# Patient Record
Sex: MALE | Race: WHITE | HISPANIC OR LATINO | URBAN - METROPOLITAN AREA
[De-identification: names, ages, dates, MRNs, and addresses within clinical notes are randomized per-mention and may not be internally consistent; named-entity substitution may affect disease eponyms.]

---
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
## 2021-09-30 NOTE — PATIENT PROFILE PEDIATRIC. - CONTRAINDICATIONS (SELECT ALL THAT APPLY)
[FreeTextEntry1] : I, Ninoska Morales wrote this note acting as a scribe for Dr. Guero Jeong on Sep 30, 2021.  none...

## 2022-06-05 ENCOUNTER — NON-APPOINTMENT (OUTPATIENT)
Age: 22
End: 2022-06-05

## 2023-10-10 NOTE — PATIENT PROFILE PEDIATRIC. - PRO ANTICIPATED DISCH DISP
Arrives with c/o abscess to left axilla and left groin. Denies drainage. Denies fevers/chills.   
home

## 2023-12-04 ENCOUNTER — EMERGENCY (EMERGENCY)
Facility: HOSPITAL | Age: 23
LOS: 0 days | Discharge: HOME | End: 2023-12-04
Attending: EMERGENCY MEDICINE
Payer: MEDICAID

## 2023-12-04 VITALS
WEIGHT: 248.9 LBS | TEMPERATURE: 99 F | DIASTOLIC BLOOD PRESSURE: 62 MMHG | SYSTOLIC BLOOD PRESSURE: 128 MMHG | HEART RATE: 115 BPM | OXYGEN SATURATION: 99 % | RESPIRATION RATE: 17 BRPM

## 2023-12-04 DIAGNOSIS — R04.2 HEMOPTYSIS: ICD-10-CM

## 2023-12-04 DIAGNOSIS — J02.9 ACUTE PHARYNGITIS, UNSPECIFIED: ICD-10-CM

## 2023-12-04 DIAGNOSIS — R06.83 SNORING: ICD-10-CM

## 2023-12-04 PROCEDURE — 99283 EMERGENCY DEPT VISIT LOW MDM: CPT

## 2023-12-04 PROCEDURE — 99282 EMERGENCY DEPT VISIT SF MDM: CPT

## 2023-12-04 NOTE — ED PROVIDER NOTE - PATIENT PORTAL LINK FT
You can access the FollowMyHealth Patient Portal offered by [facility] by registering at the following website: [email or web address]. By joining [email or web address]’s FollowMyHealth portal, you will also be able to view your health information using other applications (apps) compatible with our system. You can access the FollowMyHealth Patient Portal offered by [facility] by registering at the following website: [email or web address]. By joining [employer]’s FollowMyHealth portal, you will also be able to view your health information using other applications (apps) compatible with our system.

## 2023-12-04 NOTE — ED ADULT TRIAGE NOTE - GLASGOW COMA SCALE: BEST MOTOR RESPONSE, MLM
[address] [facility]        Pt Name: Samm Jones  MRN: [id number]  [place] [address] [place] [address]       [place], was in York Hospital visiting someone at Critical access M6) obeys commands

## 2023-12-04 NOTE — ED PROVIDER NOTE - ATTENDING CONTRIBUTION TO CARE
23yM p/w intermittent sore throat and says he coughs up sputum every morning that is now bloody.  Also c/o nighttime snoring and knows he needs a sleep study.

## 2023-12-04 NOTE — ED PROVIDER NOTE - OBJECTIVE STATEMENT
23 yoM without significant PMHx who presents for persistent sore throat and hemoptysis in the AM for the past couple months. Reportedly has been snoring significantly and intermittently waking up at night needing to catch a breath. Has a scheduled sleep study. Is also concern regarding tonsils and if inflamed. Denies any fever, chills, N/V, chest pain, abd pain, dyspnea. No symptoms at this time. Hemoptysis only in the morning. No hormone supplements, tobacco use, or recent surgeries.

## 2023-12-04 NOTE — ED PROVIDER NOTE - PHYSICAL EXAMINATION
Initial vital signs reviewed.  General: NAD, nontoxic appearing.  HENT: AT/NC. Oropharynx clear. Tonsils without edema or erythema. No postnasal bleeding noted.  Eyes: non-injected conjunctivae b/l.  Neck: supple.  CV: RRR, no murmurs. 2+ distal pulses x4.  Pulm: nonlabored work of breathing, CTAB.  Abd: soft, nondistended, nontender.  MSK: no joint deformity.  Skin: warm, dry, well-perfused.  Neuro: A&Ox4.  Psych: appropriate mood and affect.

## 2023-12-04 NOTE — ED ADULT TRIAGE NOTE - CHIEF COMPLAINT QUOTE
Pt complaining of spitting blood. denies cough. also complaining of pain while swallowing. speaking in full sentences in triage.

## 2023-12-04 NOTE — ED PROVIDER NOTE - CLINICAL SUMMARY MEDICAL DECISION MAKING FREE TEXT BOX
23yM p/w intermittent sore throat (none at present) and daily morning cough, now w/ minimal hemoptysis.  Pt not on a/c, is well appearing, hemodynamically stable w/o airway compromise or resp distress.  D/w pt need for close o/p f/u (ENT for post nasal drip, pulm for sleep apnea testing and hemoptysis workup), supportive care and return precautions.

## 2023-12-18 ENCOUNTER — APPOINTMENT (OUTPATIENT)
Dept: OTOLARYNGOLOGY | Facility: CLINIC | Age: 23
End: 2023-12-18

## 2023-12-25 ENCOUNTER — EMERGENCY (EMERGENCY)
Facility: HOSPITAL | Age: 23
LOS: 1 days | Discharge: HOME | End: 2023-12-25
Attending: EMERGENCY MEDICINE
Payer: MEDICAID

## 2023-12-25 VITALS
OXYGEN SATURATION: 98 % | HEART RATE: 126 BPM | WEIGHT: 315 LBS | TEMPERATURE: 101 F | RESPIRATION RATE: 20 BRPM | SYSTOLIC BLOOD PRESSURE: 126 MMHG | HEIGHT: 69 IN | DIASTOLIC BLOOD PRESSURE: 91 MMHG

## 2023-12-25 VITALS
TEMPERATURE: 100 F | DIASTOLIC BLOOD PRESSURE: 77 MMHG | HEART RATE: 105 BPM | SYSTOLIC BLOOD PRESSURE: 128 MMHG | RESPIRATION RATE: 20 BRPM | OXYGEN SATURATION: 96 %

## 2023-12-25 LAB
ALBUMIN SERPL ELPH-MCNC: 3.4 G/DL — LOW (ref 3.5–5)
ALBUMIN SERPL ELPH-MCNC: 3.4 G/DL — LOW (ref 3.5–5)
ALP SERPL-CCNC: 64 U/L — SIGNIFICANT CHANGE UP (ref 40–120)
ALP SERPL-CCNC: 64 U/L — SIGNIFICANT CHANGE UP (ref 40–120)
ALT FLD-CCNC: 28 U/L DA — SIGNIFICANT CHANGE UP (ref 10–60)
ALT FLD-CCNC: 28 U/L DA — SIGNIFICANT CHANGE UP (ref 10–60)
ANION GAP SERPL CALC-SCNC: 6 MMOL/L — SIGNIFICANT CHANGE UP (ref 5–17)
ANION GAP SERPL CALC-SCNC: 6 MMOL/L — SIGNIFICANT CHANGE UP (ref 5–17)
AST SERPL-CCNC: 20 U/L — SIGNIFICANT CHANGE UP (ref 10–40)
AST SERPL-CCNC: 20 U/L — SIGNIFICANT CHANGE UP (ref 10–40)
BASOPHILS # BLD AUTO: 0.04 K/UL — SIGNIFICANT CHANGE UP (ref 0–0.2)
BASOPHILS # BLD AUTO: 0.04 K/UL — SIGNIFICANT CHANGE UP (ref 0–0.2)
BASOPHILS NFR BLD AUTO: 0.5 % — SIGNIFICANT CHANGE UP (ref 0–2)
BASOPHILS NFR BLD AUTO: 0.5 % — SIGNIFICANT CHANGE UP (ref 0–2)
BILIRUB SERPL-MCNC: 0.4 MG/DL — SIGNIFICANT CHANGE UP (ref 0.2–1.2)
BILIRUB SERPL-MCNC: 0.4 MG/DL — SIGNIFICANT CHANGE UP (ref 0.2–1.2)
BUN SERPL-MCNC: 10 MG/DL — SIGNIFICANT CHANGE UP (ref 7–18)
BUN SERPL-MCNC: 10 MG/DL — SIGNIFICANT CHANGE UP (ref 7–18)
CALCIUM SERPL-MCNC: 8.9 MG/DL — SIGNIFICANT CHANGE UP (ref 8.4–10.5)
CALCIUM SERPL-MCNC: 8.9 MG/DL — SIGNIFICANT CHANGE UP (ref 8.4–10.5)
CHLORIDE SERPL-SCNC: 103 MMOL/L — SIGNIFICANT CHANGE UP (ref 96–108)
CHLORIDE SERPL-SCNC: 103 MMOL/L — SIGNIFICANT CHANGE UP (ref 96–108)
CO2 SERPL-SCNC: 28 MMOL/L — SIGNIFICANT CHANGE UP (ref 22–31)
CO2 SERPL-SCNC: 28 MMOL/L — SIGNIFICANT CHANGE UP (ref 22–31)
CREAT SERPL-MCNC: 1.32 MG/DL — HIGH (ref 0.5–1.3)
CREAT SERPL-MCNC: 1.32 MG/DL — HIGH (ref 0.5–1.3)
EGFR: 78 ML/MIN/1.73M2 — SIGNIFICANT CHANGE UP
EGFR: 78 ML/MIN/1.73M2 — SIGNIFICANT CHANGE UP
EOSINOPHIL # BLD AUTO: 0.07 K/UL — SIGNIFICANT CHANGE UP (ref 0–0.5)
EOSINOPHIL # BLD AUTO: 0.07 K/UL — SIGNIFICANT CHANGE UP (ref 0–0.5)
EOSINOPHIL NFR BLD AUTO: 0.8 % — SIGNIFICANT CHANGE UP (ref 0–6)
EOSINOPHIL NFR BLD AUTO: 0.8 % — SIGNIFICANT CHANGE UP (ref 0–6)
FLUAV AG NPH QL: SIGNIFICANT CHANGE UP
FLUAV AG NPH QL: SIGNIFICANT CHANGE UP
FLUBV AG NPH QL: SIGNIFICANT CHANGE UP
FLUBV AG NPH QL: SIGNIFICANT CHANGE UP
GLUCOSE SERPL-MCNC: 100 MG/DL — HIGH (ref 70–99)
GLUCOSE SERPL-MCNC: 100 MG/DL — HIGH (ref 70–99)
HCT VFR BLD CALC: 34.4 % — LOW (ref 39–50)
HCT VFR BLD CALC: 34.4 % — LOW (ref 39–50)
HGB BLD-MCNC: 11.1 G/DL — LOW (ref 13–17)
HGB BLD-MCNC: 11.1 G/DL — LOW (ref 13–17)
HIV 1 & 2 AB SERPL IA.RAPID: SIGNIFICANT CHANGE UP
HIV 1 & 2 AB SERPL IA.RAPID: SIGNIFICANT CHANGE UP
IMM GRANULOCYTES NFR BLD AUTO: 0.5 % — SIGNIFICANT CHANGE UP (ref 0–0.9)
IMM GRANULOCYTES NFR BLD AUTO: 0.5 % — SIGNIFICANT CHANGE UP (ref 0–0.9)
LACTATE SERPL-SCNC: 1 MMOL/L — SIGNIFICANT CHANGE UP (ref 0.7–2)
LACTATE SERPL-SCNC: 1 MMOL/L — SIGNIFICANT CHANGE UP (ref 0.7–2)
LYMPHOCYTES # BLD AUTO: 1.46 K/UL — SIGNIFICANT CHANGE UP (ref 1–3.3)
LYMPHOCYTES # BLD AUTO: 1.46 K/UL — SIGNIFICANT CHANGE UP (ref 1–3.3)
LYMPHOCYTES # BLD AUTO: 17.2 % — SIGNIFICANT CHANGE UP (ref 13–44)
LYMPHOCYTES # BLD AUTO: 17.2 % — SIGNIFICANT CHANGE UP (ref 13–44)
MAGNESIUM SERPL-MCNC: 2 MG/DL — SIGNIFICANT CHANGE UP (ref 1.6–2.6)
MAGNESIUM SERPL-MCNC: 2 MG/DL — SIGNIFICANT CHANGE UP (ref 1.6–2.6)
MCHC RBC-ENTMCNC: 24.5 PG — LOW (ref 27–34)
MCHC RBC-ENTMCNC: 24.5 PG — LOW (ref 27–34)
MCHC RBC-ENTMCNC: 32.3 GM/DL — SIGNIFICANT CHANGE UP (ref 32–36)
MCHC RBC-ENTMCNC: 32.3 GM/DL — SIGNIFICANT CHANGE UP (ref 32–36)
MCV RBC AUTO: 75.9 FL — LOW (ref 80–100)
MCV RBC AUTO: 75.9 FL — LOW (ref 80–100)
MONOCYTES # BLD AUTO: 1.07 K/UL — HIGH (ref 0–0.9)
MONOCYTES # BLD AUTO: 1.07 K/UL — HIGH (ref 0–0.9)
MONOCYTES NFR BLD AUTO: 12.6 % — SIGNIFICANT CHANGE UP (ref 2–14)
MONOCYTES NFR BLD AUTO: 12.6 % — SIGNIFICANT CHANGE UP (ref 2–14)
NEUTROPHILS # BLD AUTO: 5.81 K/UL — SIGNIFICANT CHANGE UP (ref 1.8–7.4)
NEUTROPHILS # BLD AUTO: 5.81 K/UL — SIGNIFICANT CHANGE UP (ref 1.8–7.4)
NEUTROPHILS NFR BLD AUTO: 68.4 % — SIGNIFICANT CHANGE UP (ref 43–77)
NEUTROPHILS NFR BLD AUTO: 68.4 % — SIGNIFICANT CHANGE UP (ref 43–77)
NRBC # BLD: 0 /100 WBCS — SIGNIFICANT CHANGE UP (ref 0–0)
NRBC # BLD: 0 /100 WBCS — SIGNIFICANT CHANGE UP (ref 0–0)
PHOSPHATE SERPL-MCNC: 3.6 MG/DL — SIGNIFICANT CHANGE UP (ref 2.5–4.5)
PHOSPHATE SERPL-MCNC: 3.6 MG/DL — SIGNIFICANT CHANGE UP (ref 2.5–4.5)
PLATELET # BLD AUTO: 317 K/UL — SIGNIFICANT CHANGE UP (ref 150–400)
PLATELET # BLD AUTO: 317 K/UL — SIGNIFICANT CHANGE UP (ref 150–400)
POTASSIUM SERPL-MCNC: 4.1 MMOL/L — SIGNIFICANT CHANGE UP (ref 3.5–5.3)
POTASSIUM SERPL-MCNC: 4.1 MMOL/L — SIGNIFICANT CHANGE UP (ref 3.5–5.3)
POTASSIUM SERPL-SCNC: 4.1 MMOL/L — SIGNIFICANT CHANGE UP (ref 3.5–5.3)
POTASSIUM SERPL-SCNC: 4.1 MMOL/L — SIGNIFICANT CHANGE UP (ref 3.5–5.3)
PROT SERPL-MCNC: 7.1 G/DL — SIGNIFICANT CHANGE UP (ref 6–8.3)
PROT SERPL-MCNC: 7.1 G/DL — SIGNIFICANT CHANGE UP (ref 6–8.3)
RBC # BLD: 4.53 M/UL — SIGNIFICANT CHANGE UP (ref 4.2–5.8)
RBC # BLD: 4.53 M/UL — SIGNIFICANT CHANGE UP (ref 4.2–5.8)
RBC # FLD: 14.6 % — HIGH (ref 10.3–14.5)
RBC # FLD: 14.6 % — HIGH (ref 10.3–14.5)
SARS-COV-2 RNA SPEC QL NAA+PROBE: SIGNIFICANT CHANGE UP
SARS-COV-2 RNA SPEC QL NAA+PROBE: SIGNIFICANT CHANGE UP
SODIUM SERPL-SCNC: 137 MMOL/L — SIGNIFICANT CHANGE UP (ref 135–145)
SODIUM SERPL-SCNC: 137 MMOL/L — SIGNIFICANT CHANGE UP (ref 135–145)
WBC # BLD: 8.49 K/UL — SIGNIFICANT CHANGE UP (ref 3.8–10.5)
WBC # BLD: 8.49 K/UL — SIGNIFICANT CHANGE UP (ref 3.8–10.5)
WBC # FLD AUTO: 8.49 K/UL — SIGNIFICANT CHANGE UP (ref 3.8–10.5)
WBC # FLD AUTO: 8.49 K/UL — SIGNIFICANT CHANGE UP (ref 3.8–10.5)

## 2023-12-25 PROCEDURE — 82962 GLUCOSE BLOOD TEST: CPT

## 2023-12-25 PROCEDURE — 94640 AIRWAY INHALATION TREATMENT: CPT

## 2023-12-25 PROCEDURE — 85025 COMPLETE CBC W/AUTO DIFF WBC: CPT

## 2023-12-25 PROCEDURE — 71046 X-RAY EXAM CHEST 2 VIEWS: CPT | Mod: 26

## 2023-12-25 PROCEDURE — 87637 SARSCOV2&INF A&B&RSV AMP PRB: CPT

## 2023-12-25 PROCEDURE — 83605 ASSAY OF LACTIC ACID: CPT

## 2023-12-25 PROCEDURE — 99284 EMERGENCY DEPT VISIT MOD MDM: CPT

## 2023-12-25 PROCEDURE — 96374 THER/PROPH/DIAG INJ IV PUSH: CPT

## 2023-12-25 PROCEDURE — 99285 EMERGENCY DEPT VISIT HI MDM: CPT | Mod: 25

## 2023-12-25 PROCEDURE — 86703 HIV-1/HIV-2 1 RESULT ANTBDY: CPT

## 2023-12-25 PROCEDURE — 36415 COLL VENOUS BLD VENIPUNCTURE: CPT

## 2023-12-25 PROCEDURE — 93005 ELECTROCARDIOGRAM TRACING: CPT

## 2023-12-25 PROCEDURE — 96361 HYDRATE IV INFUSION ADD-ON: CPT

## 2023-12-25 PROCEDURE — 83735 ASSAY OF MAGNESIUM: CPT

## 2023-12-25 PROCEDURE — 80053 COMPREHEN METABOLIC PANEL: CPT

## 2023-12-25 PROCEDURE — 84100 ASSAY OF PHOSPHORUS: CPT

## 2023-12-25 PROCEDURE — 71046 X-RAY EXAM CHEST 2 VIEWS: CPT

## 2023-12-25 RX ORDER — SODIUM CHLORIDE 9 MG/ML
1000 INJECTION INTRAMUSCULAR; INTRAVENOUS; SUBCUTANEOUS ONCE
Refills: 0 | Status: COMPLETED | OUTPATIENT
Start: 2023-12-25 | End: 2023-12-25

## 2023-12-25 RX ORDER — KETOROLAC TROMETHAMINE 30 MG/ML
15 SYRINGE (ML) INJECTION ONCE
Refills: 0 | Status: DISCONTINUED | OUTPATIENT
Start: 2023-12-25 | End: 2023-12-25

## 2023-12-25 RX ORDER — ALBUTEROL 90 UG/1
2.5 AEROSOL, METERED ORAL
Refills: 0 | Status: COMPLETED | OUTPATIENT
Start: 2023-12-25 | End: 2023-12-25

## 2023-12-25 RX ORDER — ACETAMINOPHEN 500 MG
650 TABLET ORAL ONCE
Refills: 0 | Status: COMPLETED | OUTPATIENT
Start: 2023-12-25 | End: 2023-12-25

## 2023-12-25 RX ORDER — ALBUTEROL 90 UG/1
2 AEROSOL, METERED ORAL ONCE
Refills: 0 | Status: COMPLETED | OUTPATIENT
Start: 2023-12-25 | End: 2023-12-25

## 2023-12-25 RX ADMIN — ALBUTEROL 2.5 MILLIGRAM(S): 90 AEROSOL, METERED ORAL at 17:26

## 2023-12-25 RX ADMIN — ALBUTEROL 2 PUFF(S): 90 AEROSOL, METERED ORAL at 19:42

## 2023-12-25 RX ADMIN — Medication 40 MILLIGRAM(S): at 16:56

## 2023-12-25 RX ADMIN — SODIUM CHLORIDE 1000 MILLILITER(S): 9 INJECTION INTRAMUSCULAR; INTRAVENOUS; SUBCUTANEOUS at 16:58

## 2023-12-25 RX ADMIN — ALBUTEROL 2.5 MILLIGRAM(S): 90 AEROSOL, METERED ORAL at 17:10

## 2023-12-25 RX ADMIN — SODIUM CHLORIDE 1000 MILLILITER(S): 9 INJECTION INTRAMUSCULAR; INTRAVENOUS; SUBCUTANEOUS at 19:31

## 2023-12-25 RX ADMIN — ALBUTEROL 2.5 MILLIGRAM(S): 90 AEROSOL, METERED ORAL at 16:56

## 2023-12-25 RX ADMIN — Medication 15 MILLIGRAM(S): at 16:56

## 2023-12-25 RX ADMIN — Medication 15 MILLIGRAM(S): at 17:26

## 2023-12-25 NOTE — ED PROVIDER NOTE - CLINICAL SUMMARY MEDICAL DECISION MAKING FREE TEXT BOX
23 male with hx of no known medical problems.   Pt presenting to the ED reporting 1-2 days of fever, cough w blood tinged sputum, dyspnea, & vomiting/diarrhea.   Triage reporting vomiting blood but Pt (& aunt by phone) confirm it is cough w blood tinged sputum.  Vitals w fever & tachycardia  Nontoxic appearing, n/v intact. Airway intact, no respiratory distress, no hypoxia. Lungs wheezing b/l. No abdominal or CVA tenderness. No recent black/bloody stools.  Symptoms concerning for viral illness w bronchitis    Plan to obtain:    -Labs, EKG, CXR r/o PNA, analgesia, antiemetics, antipyretics, & bronchodilators as needed, IV fluids, steroids, observe/re-assess.    Lab values demonstrate no acute/emergent pathology.    Independent interpretation of the EKG: Sinus @ 119, normal axis, normal intervals, normal ST/T  Independent interpretation of XR: ***    ***    Pt advised regarding need for close outpatient follow up.  Patient stable for further care in outpatient setting. No indication for inpatient admission at this time. Patient advised regarding symptomatic & supportive care and symptoms to prompt ED return. Strict return precautions provided.  ***  Patient requires inpatient admission for further care & stabilization. Care signed out to inpatient team. 23 male with hx of no known medical problems.   Pt presenting to the ED reporting 1-2 days of fever, cough w blood tinged sputum, dyspnea, & vomiting/diarrhea.   Triage reporting vomiting blood but Pt (& aunt by phone) confirm it is cough w blood tinged sputum.  Vitals w fever & tachycardia  Nontoxic appearing, n/v intact. Airway intact, no respiratory distress, no hypoxia. Lungs wheezing b/l. No abdominal or CVA tenderness. No recent black/bloody stools.  Symptoms concerning for viral illness w bronchitis    Plan to obtain:    -Labs, EKG, CXR r/o PNA, analgesia, antiemetics, antipyretics, & bronchodilators as needed, IV fluids, steroids, observe/re-assess.    Lab values demonstrate no acute/emergent pathology.  Independent interpretation of the EKG: Sinus @ 119, normal axis, normal intervals, normal ST/T  Independent interpretation of XR: No consolidation/effusion/pntx   Steroids, bronchodilators, & antipyretics given w improved symptoms.  Pt advised regarding need for close outpatient follow up.  Patient stable for further care in outpatient setting. No indication for inpatient admission at this time. Patient advised regarding symptomatic & supportive care and symptoms to prompt ED return. Strict return precautions provided.

## 2023-12-25 NOTE — ED PROVIDER NOTE - NSFOLLOWUPCLINICS_GEN_ALL_ED_FT
[facility]  Internal Medicine  [address] [place] [address]  Phone: [phone]  Fax: [phone]  Follow Up Time: 1-3 Days     [facility]  Internal Medicine  [address] [place] [address]  Phone: [phone]  Fax: [phone]  Follow Up Time: 1-3 Days

## 2023-12-25 NOTE — ED PROVIDER NOTE - NS ED ROS FT
Constitutional: (+) fever Tmax 101.9 (-) chills  HENT: (-) congestion (-) rhinorrhea (+) sore throat  Eyes: (-) pain (-) redness  Respiratory: (+) cough w sputum and blood tinged (+) shortness of breath (-) wheezing (-) stridor    Cardiovascular: (-) chest pain (-) palpitations (-) leg swelling  Gastrointestinal: (-) abdominal pain (-) blood in stool (no melena/hematochezia) (+) diarrhea (+) vomiting x4 (no blood/bile)  Genitourinary: (-) dysuria (-) hematuria  Musculoskeletal: (-) gait problem (-) joint swelling (-) myalgias  Skin: (-) color change (-) rash  Neurological: (-) weakness (-) numbness (-) headaches  Psychiatric/Behavioral: (-) confusion

## 2023-12-25 NOTE — ED PROVIDER NOTE - PHYSICAL EXAMINATION
Gen:  Awake, alert, NAD, WDWN, NCAT, non-toxic appearing.   Eyes:  PERRL, EOMI, no icterus, normal lids/lashes, normal conjunctivae.  ENT:  External inspection normal, pink/moist membranes. Pharynx normal, no pharyngeal erythema/exudate, no drooling, no lip/tongue/palate/posterior pharynx edema, midline uvula, no oropharyngeal ulcerations/lesions.  CV:  S1S2, mild tachycardia, regular rhythm, no murmur/gallops/rubs, no JVD, 2+ pulses b/l, no edema/cords/homans, warm/well-perfused.  Resp:  Normal respiratory rate/effort, no respiratory distress, normal voice, speaking full sentences, lungs wheezing to auscultation b/l, no retractions, no stridor.  Abd:  Soft abdomen, no tender/distended/guarding/rebound, no pulsatile mass, no CVA tender.   Musculoskeletal:  N/V intact, FROM all 4 extremities, normal motor tone, stable gait.   Neck:  FROM neck, supple, trachea midline, no meningismus.   Skin:  Color normal for race, warm and dry, no rash.  Neuro:  Oriented x3, CN 2-12 intact (grossly), normal motor (grossly), normal sensory (grossly), normal gait. GCS 15  Psych:  Attention normal. Affect normal. Behavior normal. Judgment normal.

## 2023-12-25 NOTE — ED PROVIDER NOTE - OBJECTIVE STATEMENT
23 male with hx of no known medical problems.   Pt presenting to the ED reporting []    No trauma. No blood thinner use. No new exercise/strain.    No recent travel, hospitalization, or immobilization. 23 male with hx of HLD.   Pt presenting to the ED reporting 1-2 days of fever, cough w blood tinged sputum, dyspnea, & vomiting/diarrhea.   No recent travel, hospitalization, or immobilization.

## 2023-12-25 NOTE — ED PROVIDER NOTE - PATIENT PORTAL LINK FT
You can access the FollowMyHealth Patient Portal offered by [facility] by registering at the following website: [email or web address]. By joining [employer]’s FollowMyHealth portal, you will also be able to view your health information using other applications (apps) compatible with our system. You can access the FollowMyHealth Patient Portal offered by [facility] by registering at the following website: [email or web address]. By joining [employer]’s FollowMyHealth portal, you will also be able to view your health information using other applications (apps) compatible with our system.

## 2023-12-25 NOTE — ED PROVIDER NOTE - PROGRESS NOTE DETAILS
Results reviewed.  Pt reports feeling better.  Tolerating PO intake.  Albuterol MDI given for discharge.  Pt/family advised regarding symptomatic/supportive care, importance of PMD follow up, and symptoms to prompt ED return.

## 2023-12-25 NOTE — ED ADULT NURSE NOTE - NSFALLUNIVINTERV_ED_ALL_ED
Bed/Stretcher in lowest position, wheels locked, appropriate side rails in place/Call bell, personal items and telephone in reach/Instruct patient to call for assistance before getting out of bed/chair/stretcher/Non-slip footwear applied when patient is off stretcher/[place] to call system/Physically safe environment - no spills, clutter or unnecessary equipment/Purposeful proactive rounding/Room/bathroom lighting operational, light cord in reach Bed/Stretcher in lowest position, wheels locked, appropriate side rails in place/Call bell, personal items and telephone in reach/Instruct patient to call for assistance before getting out of bed/chair/stretcher/Non-slip footwear applied when patient is off stretcher/[place] to call system/Physically safe environment - no spills, clutter or unnecessary equipment/Purposeful proactive rounding/Room/bathroom lighting operational, light cord in reach

## 2023-12-25 NOTE — ED PROVIDER NOTE - NSFOLLOWUPINSTRUCTIONS_ED_ALL_ED_FT
Please follow up with your PMD or Medicine Clinic in 2-3 days.  Return to the ER for worsening or concerning symptoms.  Drink plenty of fluids or an oral rehydration solution like Pedialyte, Gatorade, or Powerade.  Keep your diet simple until symptoms improve. Introduce foods as tolerated such as bread, toast, plain rice, boiled potatoes, boiled chicken, bananas, apple sauce, etc. Avoid dairy, spicy, greasy, or fatty foods. Avoid alcohol or tobacco.  Quarantine at home for 5-10 days after the start of symptoms. Isolate from any family members you live with.  Take Albuterol 2 puffs with spacer every 4 hours as needed for wheezing/difficulty breathing.  Take Prednisone 40mg daily for the next 5 days.  Take Ibuprofen (Advil or Motrin) 600mg every 6 hours as needed for pain or fever with food.  Take Acetaminophen (Tylenol) 650mg every 6 hours as needed for pain or fever.    - - - - - - - - - - - - -  Acute Bronchitis, Adult  A comparison between normal and swollen airways, or bronchi, in the lungs.  Acute bronchitis is sudden inflammation of the main airways (bronchi) that come off the windpipe (trachea) in the lungs. The swelling causes the airways to get smaller and make more mucus than normal. This can make it hard to breathe and can cause coughing or noisy breathing (wheezing).    Acute bronchitis may last several weeks. The cough may last longer. Allergies, asthma, and exposure to smoke may make the condition worse.    What are the causes?  This condition can be caused by germs and by substances that irritate the lungs, including:  Cold and flu viruses. The most common cause of this condition is the virus that causes the common cold.  Bacteria. This is less common.  Breathing in substances that irritate the lungs, including:  Smoke from cigarettes and other forms of tobacco.  Dust and pollen.  Fumes from household cleaning products, gases, or burned fuel.  Indoor or outdoor air pollution.  What increases the risk?  The following factors may make you more likely to develop this condition:  A weak body's defense system, also called the immune system.  A condition that affects your lungs and breathing, such as asthma.  What are the signs or symptoms?  Common symptoms of this condition include:  Coughing. This may bring up clear, yellow, or green mucus from your lungs (sputum).  Wheezing.  Runny or stuffy nose.  Having too much mucus in your lungs (chest congestion).  Shortness of breath.  Aches and pains, including sore throat or chest.  How is this diagnosed?  This condition is usually diagnosed based on:  Your symptoms and medical history.  A physical exam.  You may also have other tests, including tests to rule out other conditions, such as pneumonia. These tests include:  A test of lung function.  Test of a mucus sample to look for the presence of bacteria.  Tests to check the oxygen level in your blood.  Blood tests.  Chest X-ray.  How is this treated?  Most cases of acute bronchitis clear up over time without treatment. Your health care provider may recommend:  Drinking more fluids to help thin your mucus so it is easier to cough up.  Taking inhaled medicine (inhaler) to improve air flow in and out of your lungs.  Using a vaporizer or a humidifier. These are machines that add water to the air to help you breathe better.  Taking a medicine that thins mucus and clears congestion (expectorant).  Taking a medicine that prevents or stops coughing (cough suppressant).  It is not common to take an antibiotic medicine for this condition.    Follow these instructions at home:  A do not smoke cigarettes sign.  Take over-the-counter and prescription medicines only as told by your health care provider.  Use an inhaler, vaporizer, or humidifier as told by your health care provider.  Take two teaspoons (10 mL) of honey at bedtime to lessen coughing at night.  Drink enough fluid to keep your urine pale yellow.  Do not use any products that contain nicotine or tobacco. These products include cigarettes, chewing tobacco, and vaping devices, such as e-cigarettes. If you need help quitting, ask your health care provider.  Get plenty of rest.  Return to your normal activities as told by your health care provider. Ask your health care provider what activities are safe for you.  Keep all follow-up visits. This is important.  How is this prevented?  Washing hands with soap and water.  To lower your risk of getting this condition again:  Wash your hands often with soap and water for at least 20 seconds. If soap and water are not available, use hand .  Avoid contact with people who have cold symptoms.  Try not to touch your mouth, nose, or eyes with your hands.  Avoid breathing in smoke or chemical fumes. Breathing smoke or chemical fumes will make your condition worse.  Get the flu shot every year.  Contact a health care provider if:  Your symptoms do not improve after 2 weeks.  You have trouble coughing up the mucus.  Your cough keeps you awake at night.  You have a fever.  Get help right away if you:  Cough up blood.  Feel pain in your chest.  Have severe shortness of breath.  Faint or keep feeling like you are going to faint.  Have a severe headache.  Have a fever or chills that get worse.  These symptoms may represent a serious problem that is an emergency. Do not wait to see if the symptoms will go away. Get medical help right away. Call your local emergency services (911 in the [place] Do not drive yourself to the hospital.    Summary  Acute bronchitis is inflammation of the main airways (bronchi) that come off the windpipe (trachea) in the lungs. The swelling causes the airways to get smaller and make more mucus than normal.  Drinking more fluids can help thin your mucus so it is easier to cough up.  Take over-the-counter and prescription medicines only as told by your health care provider.  Do not use any products that contain nicotine or tobacco. These products include cigarettes, chewing tobacco, and vaping devices, such as e-cigarettes. If you need help quitting, ask your health care provider.  Contact a health care provider if your symptoms do not improve after 2 weeks.  This information is not intended to replace advice given to you by your health care provider. Make sure you discuss any questions you have with your health care provider.

## 2023-12-25 NOTE — ED ADULT NURSE NOTE - OBJECTIVE STATEMENT
Pt arrived to ED c/o flu like symptoms and vomiting blood   c/o shortness of breath, congestion, wheezing

## 2024-01-05 ENCOUNTER — OUTPATIENT (OUTPATIENT)
Dept: OUTPATIENT SERVICES | Facility: HOSPITAL | Age: 24
LOS: 1 days | End: 2024-01-05
Payer: MEDICAID

## 2024-01-05 ENCOUNTER — APPOINTMENT (OUTPATIENT)
Dept: PULMONOLOGY | Facility: CLINIC | Age: 24
End: 2024-01-05
Payer: MEDICAID

## 2024-01-05 VITALS
OXYGEN SATURATION: 99 % | HEART RATE: 125 BPM | WEIGHT: 251 LBS | DIASTOLIC BLOOD PRESSURE: 79 MMHG | SYSTOLIC BLOOD PRESSURE: 135 MMHG | TEMPERATURE: 97.3 F

## 2024-01-05 DIAGNOSIS — J20.9 ACUTE BRONCHITIS, UNSPECIFIED: ICD-10-CM

## 2024-01-05 DIAGNOSIS — J35.1 HYPERTROPHY OF TONSILS: ICD-10-CM

## 2024-01-05 DIAGNOSIS — R49.0 DYSPHONIA: ICD-10-CM

## 2024-01-05 DIAGNOSIS — Z00.00 ENCOUNTER FOR GENERAL ADULT MEDICAL EXAMINATION WITHOUT ABNORMAL FINDINGS: ICD-10-CM

## 2024-01-05 DIAGNOSIS — J40 BRONCHITIS, NOT SPECIFIED AS ACUTE OR CHRONIC: ICD-10-CM

## 2024-01-05 PROCEDURE — 99204 OFFICE O/P NEW MOD 45 MIN: CPT

## 2024-01-05 NOTE — PHYSICAL EXAM
[No Acute Distress] : no acute distress [Elongated Uvula] : elongated uvula [III] : Mallampati Class: III [2+] : Right Tonsil: 2+ [Normal Appearance] : normal appearance [Normal Rate/Rhythm] : normal rate/rhythm [Normal S1, S2] : normal s1, s2 [No Resp Distress] : no resp distress [Clear to Auscultation Bilaterally] : clear to auscultation bilaterally [No Abnormalities] : no abnormalities [Benign] : benign [Normal Gait] : normal gait [No Clubbing] : no clubbing [No Edema] : no edema [Normal Color/ Pigmentation] : normal color/ pigmentation [Oriented x3] : oriented x3

## 2024-01-05 NOTE — END OF VISIT
[] : Resident [FreeTextEntry3] : Likely viral bronchitis; laryngitis  Improving  Had CXR that was clear On Abx, albuterol and prednisone Also has KIMMIE; following with PCP; recommended CPAP Mother inquiring about adenotonsileectomy as well - ENT referral placed  6 months follow up

## 2024-01-05 NOTE — HISTORY OF PRESENT ILLNESS
[TextBox_4] : 23-year-old man, non, smoker, presenting after referral from urgent care for dry cough and episode of hemoptysis. Patient states that he was tested negative for COVID, Flu and RSV and was prescribed antibiotics. Currently feeling better, only has voice hoarseness persisting. Patient also snores a lot as per the mother and underwent sleep study which showed KIMMIE. Report wasn't available

## 2024-01-05 NOTE — REVIEW OF SYSTEMS
[Cough] : cough [Chest Tightness] : chest tightness [A.M. Dry Mouth] : a.m. dry mouth [Chest Discomfort] : chest discomfort [Negative] : Dermatologic

## 2024-01-05 NOTE — ASSESSMENT
[FreeTextEntry1] : #Acute Bronchitis - RVP normal and CXR normal at urgent care - Continue course of AMoxicillin and Prednisone - Benzonatate and Albuterol as needed   #KIMMIE #Elongated Uvula - Recommended CPAP treatment, will convey with PCP and get back with an answer - Referral to ENT for possible surgical intervention - Educated patient that surgery may or may not be curative - RTC in 6 months or PRN

## 2024-01-09 DIAGNOSIS — G47.33 OBSTRUCTIVE SLEEP APNEA (ADULT) (PEDIATRIC): ICD-10-CM

## 2024-01-09 DIAGNOSIS — J20.9 ACUTE BRONCHITIS, UNSPECIFIED: ICD-10-CM

## 2024-01-09 DIAGNOSIS — J35.1 HYPERTROPHY OF TONSILS: ICD-10-CM

## 2024-01-09 DIAGNOSIS — J40 BRONCHITIS, NOT SPECIFIED AS ACUTE OR CHRONIC: ICD-10-CM

## 2024-01-09 DIAGNOSIS — R49.0 DYSPHONIA: ICD-10-CM

## 2024-01-10 ENCOUNTER — NON-APPOINTMENT (OUTPATIENT)
Age: 24
End: 2024-01-10

## 2024-01-11 ENCOUNTER — EMERGENCY (EMERGENCY)
Facility: HOSPITAL | Age: 24
LOS: 0 days | Discharge: HOME | End: 2024-01-12
Attending: EMERGENCY MEDICINE | Admitting: STUDENT IN AN ORGANIZED HEALTH CARE EDUCATION/TRAINING PROGRAM
Payer: MEDICAID

## 2024-01-11 VITALS
RESPIRATION RATE: 15 BRPM | OXYGEN SATURATION: 97 % | SYSTOLIC BLOOD PRESSURE: 126 MMHG | TEMPERATURE: 98 F | HEIGHT: 69 IN | DIASTOLIC BLOOD PRESSURE: 72 MMHG | WEIGHT: 235.01 LBS | HEART RATE: 117 BPM

## 2024-01-11 DIAGNOSIS — E78.5 HYPERLIPIDEMIA, UNSPECIFIED: ICD-10-CM

## 2024-01-11 DIAGNOSIS — D50.9 IRON DEFICIENCY ANEMIA, UNSPECIFIED: ICD-10-CM

## 2024-01-11 DIAGNOSIS — G47.33 OBSTRUCTIVE SLEEP APNEA (ADULT) (PEDIATRIC): ICD-10-CM

## 2024-01-11 DIAGNOSIS — A15.9 RESPIRATORY TUBERCULOSIS UNSPECIFIED: ICD-10-CM

## 2024-01-11 DIAGNOSIS — J40 BRONCHITIS, NOT SPECIFIED AS ACUTE OR CHRONIC: ICD-10-CM

## 2024-01-11 DIAGNOSIS — R05.9 COUGH, UNSPECIFIED: ICD-10-CM

## 2024-01-11 LAB
ANION GAP SERPL CALC-SCNC: 13 MMOL/L — SIGNIFICANT CHANGE UP (ref 7–14)
ANION GAP SERPL CALC-SCNC: 13 MMOL/L — SIGNIFICANT CHANGE UP (ref 7–14)
BASOPHILS # BLD AUTO: 0.08 K/UL — SIGNIFICANT CHANGE UP (ref 0–0.2)
BASOPHILS # BLD AUTO: 0.08 K/UL — SIGNIFICANT CHANGE UP (ref 0–0.2)
BASOPHILS NFR BLD AUTO: 0.9 % — SIGNIFICANT CHANGE UP (ref 0–1)
BASOPHILS NFR BLD AUTO: 0.9 % — SIGNIFICANT CHANGE UP (ref 0–1)
BUN SERPL-MCNC: 14 MG/DL — SIGNIFICANT CHANGE UP (ref 10–20)
BUN SERPL-MCNC: 14 MG/DL — SIGNIFICANT CHANGE UP (ref 10–20)
CALCIUM SERPL-MCNC: 9.1 MG/DL — SIGNIFICANT CHANGE UP (ref 8.4–10.5)
CALCIUM SERPL-MCNC: 9.1 MG/DL — SIGNIFICANT CHANGE UP (ref 8.4–10.5)
CHLORIDE SERPL-SCNC: 103 MMOL/L — SIGNIFICANT CHANGE UP (ref 98–110)
CHLORIDE SERPL-SCNC: 103 MMOL/L — SIGNIFICANT CHANGE UP (ref 98–110)
CO2 SERPL-SCNC: 23 MMOL/L — SIGNIFICANT CHANGE UP (ref 17–32)
CO2 SERPL-SCNC: 23 MMOL/L — SIGNIFICANT CHANGE UP (ref 17–32)
CREAT SERPL-MCNC: 1 MG/DL — SIGNIFICANT CHANGE UP (ref 0.7–1.5)
CREAT SERPL-MCNC: 1 MG/DL — SIGNIFICANT CHANGE UP (ref 0.7–1.5)
EGFR: 109 ML/MIN/1.73M2 — SIGNIFICANT CHANGE UP
EGFR: 109 ML/MIN/1.73M2 — SIGNIFICANT CHANGE UP
EOSINOPHIL # BLD AUTO: 0.26 K/UL — SIGNIFICANT CHANGE UP (ref 0–0.7)
EOSINOPHIL # BLD AUTO: 0.26 K/UL — SIGNIFICANT CHANGE UP (ref 0–0.7)
EOSINOPHIL NFR BLD AUTO: 2.8 % — SIGNIFICANT CHANGE UP (ref 0–8)
EOSINOPHIL NFR BLD AUTO: 2.8 % — SIGNIFICANT CHANGE UP (ref 0–8)
GLUCOSE SERPL-MCNC: 118 MG/DL — HIGH (ref 70–99)
GLUCOSE SERPL-MCNC: 118 MG/DL — HIGH (ref 70–99)
HCT VFR BLD CALC: 29.5 % — LOW (ref 42–52)
HCT VFR BLD CALC: 29.5 % — LOW (ref 42–52)
HGB BLD-MCNC: 9.2 G/DL — LOW (ref 14–18)
HGB BLD-MCNC: 9.2 G/DL — LOW (ref 14–18)
IMM GRANULOCYTES NFR BLD AUTO: 0.8 % — HIGH (ref 0.1–0.3)
IMM GRANULOCYTES NFR BLD AUTO: 0.8 % — HIGH (ref 0.1–0.3)
LYMPHOCYTES # BLD AUTO: 2.24 K/UL — SIGNIFICANT CHANGE UP (ref 1.2–3.4)
LYMPHOCYTES # BLD AUTO: 2.24 K/UL — SIGNIFICANT CHANGE UP (ref 1.2–3.4)
LYMPHOCYTES # BLD AUTO: 24.4 % — SIGNIFICANT CHANGE UP (ref 20.5–51.1)
LYMPHOCYTES # BLD AUTO: 24.4 % — SIGNIFICANT CHANGE UP (ref 20.5–51.1)
MCHC RBC-ENTMCNC: 23.2 PG — LOW (ref 27–31)
MCHC RBC-ENTMCNC: 23.2 PG — LOW (ref 27–31)
MCHC RBC-ENTMCNC: 31.2 G/DL — LOW (ref 32–37)
MCHC RBC-ENTMCNC: 31.2 G/DL — LOW (ref 32–37)
MCV RBC AUTO: 74.5 FL — LOW (ref 80–94)
MCV RBC AUTO: 74.5 FL — LOW (ref 80–94)
MONOCYTES # BLD AUTO: 0.89 K/UL — HIGH (ref 0.1–0.6)
MONOCYTES # BLD AUTO: 0.89 K/UL — HIGH (ref 0.1–0.6)
MONOCYTES NFR BLD AUTO: 9.7 % — HIGH (ref 1.7–9.3)
MONOCYTES NFR BLD AUTO: 9.7 % — HIGH (ref 1.7–9.3)
NEUTROPHILS # BLD AUTO: 5.63 K/UL — SIGNIFICANT CHANGE UP (ref 1.4–6.5)
NEUTROPHILS # BLD AUTO: 5.63 K/UL — SIGNIFICANT CHANGE UP (ref 1.4–6.5)
NEUTROPHILS NFR BLD AUTO: 61.4 % — SIGNIFICANT CHANGE UP (ref 42.2–75.2)
NEUTROPHILS NFR BLD AUTO: 61.4 % — SIGNIFICANT CHANGE UP (ref 42.2–75.2)
NRBC # BLD: 0 /100 WBCS — SIGNIFICANT CHANGE UP (ref 0–0)
NRBC # BLD: 0 /100 WBCS — SIGNIFICANT CHANGE UP (ref 0–0)
PLATELET # BLD AUTO: 510 K/UL — HIGH (ref 130–400)
PLATELET # BLD AUTO: 510 K/UL — HIGH (ref 130–400)
PMV BLD: 9.3 FL — SIGNIFICANT CHANGE UP (ref 7.4–10.4)
PMV BLD: 9.3 FL — SIGNIFICANT CHANGE UP (ref 7.4–10.4)
POTASSIUM SERPL-MCNC: 4.7 MMOL/L — SIGNIFICANT CHANGE UP (ref 3.5–5)
POTASSIUM SERPL-MCNC: 4.7 MMOL/L — SIGNIFICANT CHANGE UP (ref 3.5–5)
POTASSIUM SERPL-SCNC: 4.7 MMOL/L — SIGNIFICANT CHANGE UP (ref 3.5–5)
POTASSIUM SERPL-SCNC: 4.7 MMOL/L — SIGNIFICANT CHANGE UP (ref 3.5–5)
RBC # BLD: 3.96 M/UL — LOW (ref 4.7–6.1)
RBC # BLD: 3.96 M/UL — LOW (ref 4.7–6.1)
RBC # FLD: 15.5 % — HIGH (ref 11.5–14.5)
RBC # FLD: 15.5 % — HIGH (ref 11.5–14.5)
SODIUM SERPL-SCNC: 139 MMOL/L — SIGNIFICANT CHANGE UP (ref 135–146)
SODIUM SERPL-SCNC: 139 MMOL/L — SIGNIFICANT CHANGE UP (ref 135–146)
WBC # BLD: 9.17 K/UL — SIGNIFICANT CHANGE UP (ref 4.8–10.8)
WBC # BLD: 9.17 K/UL — SIGNIFICANT CHANGE UP (ref 4.8–10.8)
WBC # FLD AUTO: 9.17 K/UL — SIGNIFICANT CHANGE UP (ref 4.8–10.8)
WBC # FLD AUTO: 9.17 K/UL — SIGNIFICANT CHANGE UP (ref 4.8–10.8)

## 2024-01-11 PROCEDURE — 87116 MYCOBACTERIA CULTURE: CPT

## 2024-01-11 PROCEDURE — 80053 COMPREHEN METABOLIC PANEL: CPT

## 2024-01-11 PROCEDURE — 86036 ANCA SCREEN EACH ANTIBODY: CPT

## 2024-01-11 PROCEDURE — 94660 CPAP INITIATION&MGMT: CPT

## 2024-01-11 PROCEDURE — 86403 PARTICLE AGGLUT ANTBDY SCRN: CPT

## 2024-01-11 PROCEDURE — 87206 SMEAR FLUORESCENT/ACID STAI: CPT | Mod: XU

## 2024-01-11 PROCEDURE — 93005 ELECTROCARDIOGRAM TRACING: CPT

## 2024-01-11 PROCEDURE — 86641 CRYPTOCOCCUS ANTIBODY: CPT

## 2024-01-11 PROCEDURE — 87015 SPECIMEN INFECT AGNT CONCNTJ: CPT

## 2024-01-11 PROCEDURE — 96375 TX/PRO/DX INJ NEW DRUG ADDON: CPT | Mod: XU

## 2024-01-11 PROCEDURE — 85025 COMPLETE CBC W/AUTO DIFF WBC: CPT

## 2024-01-11 PROCEDURE — 71046 X-RAY EXAM CHEST 2 VIEWS: CPT

## 2024-01-11 PROCEDURE — 71275 CT ANGIOGRAPHY CHEST: CPT | Mod: TC,MA

## 2024-01-11 PROCEDURE — 86850 RBC ANTIBODY SCREEN: CPT

## 2024-01-11 PROCEDURE — 80048 BASIC METABOLIC PNL TOTAL CA: CPT

## 2024-01-11 PROCEDURE — 83520 IMMUNOASSAY QUANT NOS NONAB: CPT

## 2024-01-11 PROCEDURE — 87385 HISTOPLASMA CAPSUL AG IA: CPT

## 2024-01-11 PROCEDURE — 85610 PROTHROMBIN TIME: CPT

## 2024-01-11 PROCEDURE — 86901 BLOOD TYPING SEROLOGIC RH(D): CPT

## 2024-01-11 PROCEDURE — 36415 COLL VENOUS BLD VENIPUNCTURE: CPT

## 2024-01-11 PROCEDURE — 86900 BLOOD TYPING SEROLOGIC ABO: CPT

## 2024-01-11 PROCEDURE — 96374 THER/PROPH/DIAG INJ IV PUSH: CPT | Mod: XU

## 2024-01-11 PROCEDURE — 99285 EMERGENCY DEPT VISIT HI MDM: CPT

## 2024-01-11 PROCEDURE — 0241U: CPT

## 2024-01-11 PROCEDURE — 85730 THROMBOPLASTIN TIME PARTIAL: CPT

## 2024-01-11 PROCEDURE — 99285 EMERGENCY DEPT VISIT HI MDM: CPT | Mod: 25

## 2024-01-11 RX ORDER — CEFTRIAXONE 500 MG/1
1000 INJECTION, POWDER, FOR SOLUTION INTRAMUSCULAR; INTRAVENOUS ONCE
Refills: 0 | Status: COMPLETED | OUTPATIENT
Start: 2024-01-11 | End: 2024-01-11

## 2024-01-11 RX ORDER — AZITHROMYCIN 500 MG/1
500 TABLET, FILM COATED ORAL ONCE
Refills: 0 | Status: COMPLETED | OUTPATIENT
Start: 2024-01-11 | End: 2024-01-11

## 2024-01-11 RX ADMIN — CEFTRIAXONE 100 MILLIGRAM(S): 500 INJECTION, POWDER, FOR SOLUTION INTRAMUSCULAR; INTRAVENOUS at 21:36

## 2024-01-11 RX ADMIN — AZITHROMYCIN 255 MILLIGRAM(S): 500 TABLET, FILM COATED ORAL at 21:37

## 2024-01-11 NOTE — ED PROVIDER NOTE - NS ED MD DISPO ISOLATION TYPES
Airborne Cibinqo Pregnancy And Lactation Text: It is unknown if this medication will adversely affect pregnancy or breast feeding.  You should not take this medication if you are currently pregnant or planning a pregnancy or while breastfeeding.

## 2024-01-11 NOTE — ED PROVIDER NOTE - ATTENDING APP SHARED VISIT CONTRIBUTION OF CARE
24 yo m with pmh of hld, sent from [facility] for persistent hemoptysis.  pt had been having symptoms for about 2 weeks.  was seen at [facility] ED and had cxr done, at the time pt was also febrile.  pt was dx wit bronchitis.  pt says his coughing was worse at night and got better with tessalon perles.  pt had f/u with pulm and was cleared.  pt is now awaiting an ENT f/u.  went to [facility] today to get tessalon perles refill.  pt was sent to ED for r/o pe.  pt denies sob.  pt admits sore throat.  no leg pain or swelling. denies smoking.  exam: nad, ncat, perrl, eomi, mmm, rrr, ctab, abd soft, nt, nd aox3, no calf tenderness, no pitting edema imp: pt with persistent hemoptysis, will check labs and cta r/o pe 24 yo m with pmh of hld, sent from [facility] for persistent hemoptysis.  pt had been having symptoms for about 2 weeks.  was seen at [facility] ED and had cxr done, at the time pt was also febrile.  pt was dx wit bronchitis.  pt says his coughing was worse at night and got better with tessalon perles.  pt had f/u with pulm and was cleared.  pt is now awaiting an ENT f/u.  went to [facility] today to get tessalon perles refill.  pt was sent to ED for r/o pe.  pt denies sob.  pt admits sore throat.  no leg pain or swelling. denies smoking.  exam: nad, ncat, perrl, eomi, mmm, rrr, ctab, abd soft, nt, nd aox3, no calf tenderness, no pitting edema imp: pt with persistent hemoptysis, will check labs and cta r/o pe

## 2024-01-11 NOTE — ED PROVIDER NOTE - OBJECTIVE STATEMENT
23y M pmh HLD presents for eval of cough. Pt has intermittent productive blood tinged cough x2wks, worse at night, improved with tessalon pearls. Associated horse voice. Pt has been evaluated by pulmonology and has ENT fu next wk. Denies fever, ha, cp, sob, dysphagia, weakness, numbness, dysuria, hematuria, n/v/d/c

## 2024-01-11 NOTE — ED PROVIDER NOTE - NS ED ATTENDING STATEMENT MOD
This was a shared visit with the EUGENE. I reviewed and verified the documentation. This was a shared visit with the EUGEEN. I reviewed and verified the documentation.

## 2024-01-11 NOTE — ED ADULT NURSE NOTE - CHIEF COMPLAINT QUOTE
pt bibems from [facility] cough with blood tinged sputum since LAMIN pt bibems from [facility] cough with blood tinged sputum since LAMIN

## 2024-01-11 NOTE — ED ADULT NURSE NOTE - NSFALLUNIVINTERV_ED_ALL_ED
Bed/Stretcher in lowest position, wheels locked, appropriate side rails in place/Call bell, personal items and telephone in reach/Instruct patient to call for assistance before getting out of bed/chair/stretcher/Non-slip footwear applied when patient is off stretcher/[place] to call system/Physically safe environment - no spills, clutter or unnecessary equipment/Purposeful proactive rounding/Room/bathroom lighting operational, light cord in reach Bed/Stretcher in lowest position, wheels locked, appropriate side rails in place/Call bell, personal items and telephone in reach/Instruct patient to call for assistance before getting out of bed/chair/stretcher/Non-slip footwear applied when patient is off stretcher/[place] to call system/Physically safe environment - no spills, clutter or unnecessary equipment/Purposeful proactive rounding/Room/bathroom lighting operational, light cord in reach

## 2024-01-11 NOTE — ED ADULT TRIAGE NOTE - CHIEF COMPLAINT QUOTE
pt bibems from [facility] [place] [place] [facility] cough with blood tinged sputum since LAMIN pt bibems from [facility] [place] [facility] cough with blood tinged sputum since LAMIN

## 2024-01-11 NOTE — ED PROVIDER NOTE - PHYSICAL EXAMINATION
CONST: Well appearing in NAD  EYES: Sclera and conjunctiva clear.   ENT: No nasal discharge. Oropharynx normal appearing, no erythema or exudates. No abscess or swelling. Uvula midline.   NECK: Non-tender, no meningeal signs. normal ROM. supple   CARD: Tachycardiac S1 S2; No jvd  RESP: Equal BS B/L, No wheezes, rhonchi or rales. No distress  GI: Soft, non-tender, non-distended. normal BS  MS: Normal ROM in all extremities. pulses 2 +. no calf tenderness or swelling  SKIN: Warm, dry, no acute rashes. Good turgor

## 2024-01-11 NOTE — ED PROVIDER NOTE - CLINICAL SUMMARY MEDICAL DECISION MAKING FREE TEXT BOX
Pt with persistent blood tinged cough, found to have upper lobe opacity/groundglass on CTA.  will admit for TB w/u.  pt is stable.  Any ordered labs and EKG were reviewed, Dr. Jessica Astorga, attending physician.  Any imaging was ordered and reviewed by me, Dr. Jessica Astorga, attending physician.  Appropriate medications for patient's presenting complaints were ordered and effects were reassessed.  Patient's records, if available,  (prior hospital, ED visit, and/or nursing home notes if available) were reviewed.  Additional history was obtained from EMS, family, and/or PCP (when available).  Escalation to admission/observation was considered.   Patient requires inpatient hospitalization - monitored setting.

## 2024-01-12 ENCOUNTER — TRANSCRIPTION ENCOUNTER (OUTPATIENT)
Age: 24
End: 2024-01-12

## 2024-01-12 VITALS
SYSTOLIC BLOOD PRESSURE: 127 MMHG | TEMPERATURE: 98 F | RESPIRATION RATE: 18 BRPM | HEART RATE: 91 BPM | OXYGEN SATURATION: 99 % | DIASTOLIC BLOOD PRESSURE: 65 MMHG

## 2024-01-12 LAB
ALBUMIN SERPL ELPH-MCNC: 4 G/DL — SIGNIFICANT CHANGE UP (ref 3.5–5.2)
ALBUMIN SERPL ELPH-MCNC: 4 G/DL — SIGNIFICANT CHANGE UP (ref 3.5–5.2)
ALP SERPL-CCNC: 59 U/L — SIGNIFICANT CHANGE UP (ref 30–115)
ALP SERPL-CCNC: 59 U/L — SIGNIFICANT CHANGE UP (ref 30–115)
ALT FLD-CCNC: 22 U/L — SIGNIFICANT CHANGE UP (ref 0–41)
ALT FLD-CCNC: 22 U/L — SIGNIFICANT CHANGE UP (ref 0–41)
ANION GAP SERPL CALC-SCNC: 9 MMOL/L — SIGNIFICANT CHANGE UP (ref 7–14)
ANION GAP SERPL CALC-SCNC: 9 MMOL/L — SIGNIFICANT CHANGE UP (ref 7–14)
APTT BLD: 31.3 SEC — SIGNIFICANT CHANGE UP (ref 27–39.2)
APTT BLD: 31.3 SEC — SIGNIFICANT CHANGE UP (ref 27–39.2)
AST SERPL-CCNC: 16 U/L — SIGNIFICANT CHANGE UP (ref 0–41)
AST SERPL-CCNC: 16 U/L — SIGNIFICANT CHANGE UP (ref 0–41)
BASOPHILS # BLD AUTO: 0.08 K/UL — SIGNIFICANT CHANGE UP (ref 0–0.2)
BASOPHILS # BLD AUTO: 0.08 K/UL — SIGNIFICANT CHANGE UP (ref 0–0.2)
BASOPHILS NFR BLD AUTO: 1.1 % — HIGH (ref 0–1)
BASOPHILS NFR BLD AUTO: 1.1 % — HIGH (ref 0–1)
BILIRUB SERPL-MCNC: 0.3 MG/DL — SIGNIFICANT CHANGE UP (ref 0.2–1.2)
BILIRUB SERPL-MCNC: 0.3 MG/DL — SIGNIFICANT CHANGE UP (ref 0.2–1.2)
BLD GP AB SCN SERPL QL: SIGNIFICANT CHANGE UP
BLD GP AB SCN SERPL QL: SIGNIFICANT CHANGE UP
BUN SERPL-MCNC: 14 MG/DL — SIGNIFICANT CHANGE UP (ref 10–20)
BUN SERPL-MCNC: 14 MG/DL — SIGNIFICANT CHANGE UP (ref 10–20)
CALCIUM SERPL-MCNC: 9.2 MG/DL — SIGNIFICANT CHANGE UP (ref 8.4–10.4)
CALCIUM SERPL-MCNC: 9.2 MG/DL — SIGNIFICANT CHANGE UP (ref 8.4–10.4)
CHLORIDE SERPL-SCNC: 105 MMOL/L — SIGNIFICANT CHANGE UP (ref 98–110)
CHLORIDE SERPL-SCNC: 105 MMOL/L — SIGNIFICANT CHANGE UP (ref 98–110)
CO2 SERPL-SCNC: 26 MMOL/L — SIGNIFICANT CHANGE UP (ref 17–32)
CO2 SERPL-SCNC: 26 MMOL/L — SIGNIFICANT CHANGE UP (ref 17–32)
CREAT SERPL-MCNC: 1.2 MG/DL — SIGNIFICANT CHANGE UP (ref 0.7–1.5)
CREAT SERPL-MCNC: 1.2 MG/DL — SIGNIFICANT CHANGE UP (ref 0.7–1.5)
EGFR: 88 ML/MIN/1.73M2 — SIGNIFICANT CHANGE UP
EGFR: 88 ML/MIN/1.73M2 — SIGNIFICANT CHANGE UP
EOSINOPHIL # BLD AUTO: 0.31 K/UL — SIGNIFICANT CHANGE UP (ref 0–0.7)
EOSINOPHIL # BLD AUTO: 0.31 K/UL — SIGNIFICANT CHANGE UP (ref 0–0.7)
EOSINOPHIL NFR BLD AUTO: 4.2 % — SIGNIFICANT CHANGE UP (ref 0–8)
EOSINOPHIL NFR BLD AUTO: 4.2 % — SIGNIFICANT CHANGE UP (ref 0–8)
FLUAV AG NPH QL: SIGNIFICANT CHANGE UP
FLUAV AG NPH QL: SIGNIFICANT CHANGE UP
FLUBV AG NPH QL: SIGNIFICANT CHANGE UP
FLUBV AG NPH QL: SIGNIFICANT CHANGE UP
GLUCOSE SERPL-MCNC: 118 MG/DL — HIGH (ref 70–99)
GLUCOSE SERPL-MCNC: 118 MG/DL — HIGH (ref 70–99)
HCT VFR BLD CALC: 28.8 % — LOW (ref 42–52)
HCT VFR BLD CALC: 28.8 % — LOW (ref 42–52)
HGB BLD-MCNC: 9.1 G/DL — LOW (ref 14–18)
HGB BLD-MCNC: 9.1 G/DL — LOW (ref 14–18)
IMM GRANULOCYTES NFR BLD AUTO: 0.7 % — HIGH (ref 0.1–0.3)
IMM GRANULOCYTES NFR BLD AUTO: 0.7 % — HIGH (ref 0.1–0.3)
INR BLD: 1.03 RATIO — SIGNIFICANT CHANGE UP (ref 0.65–1.3)
INR BLD: 1.03 RATIO — SIGNIFICANT CHANGE UP (ref 0.65–1.3)
LYMPHOCYTES # BLD AUTO: 2.05 K/UL — SIGNIFICANT CHANGE UP (ref 1.2–3.4)
LYMPHOCYTES # BLD AUTO: 2.05 K/UL — SIGNIFICANT CHANGE UP (ref 1.2–3.4)
LYMPHOCYTES # BLD AUTO: 28 % — SIGNIFICANT CHANGE UP (ref 20.5–51.1)
LYMPHOCYTES # BLD AUTO: 28 % — SIGNIFICANT CHANGE UP (ref 20.5–51.1)
MCHC RBC-ENTMCNC: 23.5 PG — LOW (ref 27–31)
MCHC RBC-ENTMCNC: 23.5 PG — LOW (ref 27–31)
MCHC RBC-ENTMCNC: 31.6 G/DL — LOW (ref 32–37)
MCHC RBC-ENTMCNC: 31.6 G/DL — LOW (ref 32–37)
MCV RBC AUTO: 74.2 FL — LOW (ref 80–94)
MCV RBC AUTO: 74.2 FL — LOW (ref 80–94)
MONOCYTES # BLD AUTO: 0.78 K/UL — HIGH (ref 0.1–0.6)
MONOCYTES # BLD AUTO: 0.78 K/UL — HIGH (ref 0.1–0.6)
MONOCYTES NFR BLD AUTO: 10.7 % — HIGH (ref 1.7–9.3)
MONOCYTES NFR BLD AUTO: 10.7 % — HIGH (ref 1.7–9.3)
NEUTROPHILS # BLD AUTO: 4.05 K/UL — SIGNIFICANT CHANGE UP (ref 1.4–6.5)
NEUTROPHILS # BLD AUTO: 4.05 K/UL — SIGNIFICANT CHANGE UP (ref 1.4–6.5)
NEUTROPHILS NFR BLD AUTO: 55.3 % — SIGNIFICANT CHANGE UP (ref 42.2–75.2)
NEUTROPHILS NFR BLD AUTO: 55.3 % — SIGNIFICANT CHANGE UP (ref 42.2–75.2)
NIGHT BLUE STAIN TISS: SIGNIFICANT CHANGE UP
NIGHT BLUE STAIN TISS: SIGNIFICANT CHANGE UP
NRBC # BLD: 0 /100 WBCS — SIGNIFICANT CHANGE UP (ref 0–0)
NRBC # BLD: 0 /100 WBCS — SIGNIFICANT CHANGE UP (ref 0–0)
PLATELET # BLD AUTO: 475 K/UL — HIGH (ref 130–400)
PLATELET # BLD AUTO: 475 K/UL — HIGH (ref 130–400)
PMV BLD: 9.4 FL — SIGNIFICANT CHANGE UP (ref 7.4–10.4)
PMV BLD: 9.4 FL — SIGNIFICANT CHANGE UP (ref 7.4–10.4)
POTASSIUM SERPL-MCNC: 4.5 MMOL/L — SIGNIFICANT CHANGE UP (ref 3.5–5)
POTASSIUM SERPL-MCNC: 4.5 MMOL/L — SIGNIFICANT CHANGE UP (ref 3.5–5)
POTASSIUM SERPL-SCNC: 4.5 MMOL/L — SIGNIFICANT CHANGE UP (ref 3.5–5)
POTASSIUM SERPL-SCNC: 4.5 MMOL/L — SIGNIFICANT CHANGE UP (ref 3.5–5)
PROT SERPL-MCNC: 6.2 G/DL — SIGNIFICANT CHANGE UP (ref 6–8)
PROT SERPL-MCNC: 6.2 G/DL — SIGNIFICANT CHANGE UP (ref 6–8)
PROTHROM AB SERPL-ACNC: 11.8 SEC — SIGNIFICANT CHANGE UP (ref 9.95–12.87)
PROTHROM AB SERPL-ACNC: 11.8 SEC — SIGNIFICANT CHANGE UP (ref 9.95–12.87)
RBC # BLD: 3.88 M/UL — LOW (ref 4.7–6.1)
RBC # BLD: 3.88 M/UL — LOW (ref 4.7–6.1)
RBC # FLD: 16 % — HIGH (ref 11.5–14.5)
RBC # FLD: 16 % — HIGH (ref 11.5–14.5)
RSV RNA NPH QL NAA+NON-PROBE: SIGNIFICANT CHANGE UP
RSV RNA NPH QL NAA+NON-PROBE: SIGNIFICANT CHANGE UP
SARS-COV-2 RNA SPEC QL NAA+PROBE: SIGNIFICANT CHANGE UP
SARS-COV-2 RNA SPEC QL NAA+PROBE: SIGNIFICANT CHANGE UP
SODIUM SERPL-SCNC: 140 MMOL/L — SIGNIFICANT CHANGE UP (ref 135–146)
SODIUM SERPL-SCNC: 140 MMOL/L — SIGNIFICANT CHANGE UP (ref 135–146)
SPECIMEN SOURCE: SIGNIFICANT CHANGE UP
SPECIMEN SOURCE: SIGNIFICANT CHANGE UP
WBC # BLD: 7.32 K/UL — SIGNIFICANT CHANGE UP (ref 4.8–10.8)
WBC # BLD: 7.32 K/UL — SIGNIFICANT CHANGE UP (ref 4.8–10.8)
WBC # FLD AUTO: 7.32 K/UL — SIGNIFICANT CHANGE UP (ref 4.8–10.8)
WBC # FLD AUTO: 7.32 K/UL — SIGNIFICANT CHANGE UP (ref 4.8–10.8)

## 2024-01-12 RX ORDER — PANTOPRAZOLE SODIUM 20 MG/1
40 TABLET, DELAYED RELEASE ORAL
Refills: 0 | Status: DISCONTINUED | OUTPATIENT
Start: 2024-01-12 | End: 2024-01-12

## 2024-01-12 RX ADMIN — PANTOPRAZOLE SODIUM 40 MILLIGRAM(S): 20 TABLET, DELAYED RELEASE ORAL at 05:34

## 2024-01-12 NOTE — H&P ADULT - ASSESSMENT
23y M pmh HLD presents for eval of cough w hemoptysis      #hemoptysis  RVP  HIV  NG  Hb 9.2 (11 on dec 25)  23y M pm HLD presents for eval of cough w hemoptysis      #hemoptysis  CT chest: Minimal COURTNEY GGO w no lymphadenopathy  treated for bronchitis w no response  RVP NG  Hb 9.2 (11 on dec 25)  fever at initial presentation   23y M pmh HLD presents for eval of cough w hemoptysis      #hemoptysis  CT chest: Minimal COURTNEY GGO w no lymphadenopathy  treated for bronchitis w no response  RVP NG  Hb 9.2 (11 on dec 25)  HD stable  fever at initial presentation  de  - fu IGRA  - fu pulm Cs, poss bronch for significant Hb drop  - fu ID Cs (TB?)    #KIMMIE not on CPAP  CPAP yet to be delivered, KIMMIE dxed around bhavna  - TTE to r/o pHTN    dvt ppx: SCD  GI ppx: protonix  diet: regular  activity: AAT   23y M pmh HLD presents for eval of cough w hemoptysis    #hemoptysis  #microcytic anemia  CT chest: Minimal COURTNEY GGO w no lymphadenopathy  treated for bronchitis w no response  RVP NG  Hb 9.2 (11 on dec 25)  HD stable  fever at initial presentation  denies weight loss/malaise  - fu IGRA     sputum AFB  gram stain  Cx        histoplasma and cryptococcal antigen      ANCA  - fu pulm Cs, poss bronch for significant Hb drop?  - fu ID Cs   - fu ENT Cs  - active T+S    #KIMMIE not on CPAP  CPAP yet to be delivered, KIMMIE dxed around bhavna  - TTE to r/o pHTN    dvt ppx: SCD  GI ppx: protonix  diet: regular  activity: AAT

## 2024-01-12 NOTE — CONSULT NOTE ADULT - ATTENDING COMMENTS
Mr. Jensen was seen and examined at bedside today, the patient is a young man who presented to the ER about 3 weeks ago with an episode of hemoptysis, and is now returning to the emergency department for similar presentation.  On CT of the chest, the patient is found to have a very small focus of left upper lobe groundglass opacification, unlikely to be consistent with the reported 3+ cups of bloody sputum produced each day.  Patient is an uncertain historian, though when offered a cup of minimally blood-tinged sputum he reported that this is very similar to his usual level of hemoptysis.      However, the patient undeniably has signs of iron deficiency anemia, and it is unlikely that he could develop such levels of anemia from this mild hemoptysis alone.  Recommend further workup including FOBT, serum iron panel, basic autoimmunity screening with SILVIA and complement levels, UA and UDS.  Would also recommend ENT evaluation with NPL to evaluate for possible upper airway bleeding prior to discharge.  So long as the patient remains minimally symptomatic and able to effectively manage his secretions, patient is stable for discharge to follow-up with Dr. Marcus Justice for outpatient bronchoscopy.  Patient was alerted to warning signs and when to return to the hospital in the event of increased levels of hemoptysis.

## 2024-01-12 NOTE — H&P ADULT - HISTORY OF PRESENT ILLNESS
23y M pmh HLD presents for eval of cough w hemoptysis  Pt has intermittent productive blood tinged cough x2wks, worse at night, improved with tessalon pearls. Associated horse voice.   Pt has been evaluated by pulmonology and has ENT fu next wk.   Denies fever, ha, cp, sob, dysphagia, weakness, numbness, dysuria, hematuria, n/v/d/c    in the ED    Hb 9.2 (11 on dec 25)   rvp NG   HIV NG   CT chest:  Minimal   left upper lobe groundglass opacities  no lymphedenopathy   23y M pmh HLD  ADHD  KIMMIE presents fOR eval of cough w hemoptysis  Pt has intermittent productive blood tinged cough x2wks, worse at night, improved with tessalon pearls.    Initially presented to ED on 12/25, had fever and dyspnea then, was discharged on prenisone 40 x 7d and  albuterol w a dx of bronchitis.    was later seen at [place] on 12/31 for same complaints   wbc 18 then (steroids), elev dimer; hemoglobin 10.7)    pulm note from 1/5: Patient also snores a lot as per the mother and underwent sleep study which showed KIMMIE.  was diagnosed w bronchitis and was given an ENT referral for further eval.   23y M pmh HLD  ADHD  KIMMIE presents fOR eval of cough w hemoptysis  Pt has intermittent productive blood tinged cough x2wks, worse at night, improved with tessalon pearls.    Initially presented to ED on 12/25, had fever and dyspnea then, was discharged on prenisone 40 x 7d and  albuterol w a dx of bronchitis.    was later seen at [place] on 12/31 for same complaints   wbc 18 then (steroids), elev dimer; hemoglobin 10.7)    pulm note from 1/5: Patient also snores a lot as per the mother and underwent sleep study which showed KIMMIE.  was diagnosed w bronchitis and was given an ENT referral for further eval.   23y M pmh HLD  ADHD  KIMMIE presents fOR eval of cough w hemoptysis  Pt has intermittent productive blood tinged cough x2wks, worse at night, improved with tessalon pearls.  Says it 1st started around bhavna. Initially had vomiting w vomitus being pepsi colored. Also had an episode of nose bleed that day when he sneezed.    Initially presented to ED on 12/25, had fever and dyspnea then, was discharged on prednisone 40 x 7d and  albuterol w a dx of bronchitis.    was later seen at [facility] on 12/31 for same complaints   wbc 18 then (steroids), elev dimer; hemoglobin 10.7)    pulm note from 1/5: Patient also snores a lot as per the mother and underwent sleep study which showed KIMMIE.  was diagnosed w bronchitis and was given an ENT referral for further eval.    In ED,   labs: Hb 9.2 (11.1 on 12/25)  CT chest:  1. Minimal left upper lobe groundglass opacities, which may be   inflammatory or infectious in etiology.    2. No evidence of acute pulmonary embolism.      sp ceftriaxone + azithromycin in ED   23y M pmh HLD  ADHD  KIMMIE presents fOR eval of cough w hemoptysis  Pt has intermittent productive blood tinged cough x2wks, worse at night, improved with tessalon pearls.  Says it 1st started around bhavna. Initially had vomiting w vomitus being pepsi colored. Also had an episode of nose bleed that day when he sneezed.    Initially presented to ED on 12/25, had fever and dyspnea then, was discharged on prednisone 40 x 7d and  albuterol w a dx of bronchitis.    was later seen at [place] on 12/31 for same complaints   wbc 18 then (steroids), elev dimer; hemoglobin 10.7)    pulm note from 1/5: Patient also snores a lot as per the mother and underwent sleep study which showed KIMMIE.  was diagnosed w bronchitis and was given an ENT referral for further eval.    In ED,   labs: Hb 9.2 (11.1 on 12/25)  CT chest:  1. Minimal left upper lobe groundglass opacities, which may be   inflammatory or infectious in etiology.    2. No evidence of acute pulmonary embolism.      sp ceftriaxone + azithromycin in ED   23y M pmh HLD  ADHD  KIMMIE presents fOR eval of cough w hemoptysis  Pt has intermittent productive blood tinged cough x2wks, worse at night, improved with tessalon pearls.  Says it 1st started around bhavna. Initially had vomiting w vomitus being pepsi colored. Also had an episode of nose bleed that day when he sneezed.  Has a hoarse voice at encounter, says has been going on since he got sick    Initially presented to ED on 12/25, had fever and dyspnea then, was discharged on prednisone 40 x 7d and  albuterol w a dx of bronchitis.    was later seen at [place] on 12/31 for same complaints   wbc 18 then (steroids), elev dimer; hemoglobin 10.7    pulm note from 1/5: Patient also snores a lot as per the mother and underwent sleep study which showed KIMMIE.  was diagnosed w bronchitis and was given an ENT referral for further eval.    Denies current fever / SOB / weight loss / malaise / recent travel ho, never left [place]    In ED,   labs: Hb 9.2 (11.1 on 12/25)  CT chest:  1. Minimal left upper lobe groundglass opacities, which may be   inflammatory or infectious in etiology.    2. No evidence of acute pulmonary embolism.      sp ceftriaxone + azithromycin in ED   23y M pmh HLD  ADHD  KIMMIE presents fOR eval of cough w hemoptysis  Pt has intermittent productive blood tinged cough x2wks, worse at night, improved with tessalon pearls.  Says it 1st started around bhavna. Initially had vomiting w vomitus being pepsi colored. Also had an episode of nose bleed that day when he sneezed.  Has a hoarse voice at encounter, says has been going on since he got sick    Initially presented to ED on 12/25, had fever and dyspnea then, was discharged on prednisone 40 x 7d and  albuterol w a dx of bronchitis.    was later seen at [place] on 12/31 for same complaints   wbc 18 then (steroids), elev dimer; hemoglobin 10.7    pulm note from 1/5: Patient also snores a lot as per the mother and underwent sleep study which showed KIMMIE.  was diagnosed w bronchitis and was given an ENT referral for further eval.    Denies current fever / SOB / weight loss / malaise / recent travel ho, never left [place]    In ED,   labs: Hb 9.2 (11.1 on 12/25)  CT chest:  1. Minimal left upper lobe groundglass opacities, which may be   inflammatory or infectious in etiology.    2. No evidence of acute pulmonary embolism.      sp ceftriaxone + azithromycin in ED

## 2024-01-12 NOTE — ED ADULT NURSE REASSESSMENT NOTE - NS ED NURSE REASSESS COMMENT FT1
Pt. received from previous RN. Pt. lying on stretcher, breathing with ease on RA. A&O x4. 18g noted to the L AC; IV intact, no redness/swelling at site. Awaiting clean bed assignment. Isolation precautions maintained; airborne.

## 2024-01-12 NOTE — DISCHARGE NOTE PROVIDER - PROVIDER TOKENS
PROVIDER:[TOKEN:[[id number]],FOLLOWUP:[1 week]],PROVIDER:[TOKEN:[[id number]],FOLLOWUP:[2 weeks]] PROVIDER:[TOKEN:[[id number]:[1 week]],PROVIDER:[TOKEN:[[id number]],FOLLOWUP:[2 weeks]]

## 2024-01-12 NOTE — CONSULT NOTE ADULT - SUBJECTIVE AND OBJECTIVE BOX
EDDY IRENE  23y  Male      Patient is a 23y old  Male who presents with a chief complaint of       PAST MEDICAL/SURGICAL HISTORY  PAST MEDICAL & SURGICAL HISTORY:  HLD (hyperlipidemia)      No significant past surgical history      REVIEW OF SYSTEMS:  CONSTITUTIONAL: No fever, weight loss, or fatigue  ENMT:  +Sore throat. No difficulty hearing, tinnitus, vertigo; No sinus or throat pain  RESPIRATORY: cough, hemoptysis, no SOB  GASTROINTESTINAL: No abdominal or epigastric pain. No nausea, vomiting, or hematemesis; No diarrhea or constipation. No melena or hematochezia.  LYMPH NODES: No enlarged glands  ENDOCRINE: No heat or cold intolerance; No hair loss  MUSCULOSKELETAL: No joint pain or swelling; No muscle, back, or extremity pain      T(C): 36.6 (01-11-24 @ 17:37), Max: 36.6 (01-11-24 @ 17:37)  HR: 105 (01-12-24 @ 07:35) (92 - 117)  BP: 140/68 (01-12-24 @ 07:35) (126/72 - 145/74)  RR: 18 (01-12-24 @ 07:35) (15 - 18)  SpO2: 99% (01-12-24 @ 07:35) (97% - 99%)  Wt(kg): --Vital Signs Last 24 Hrs  T(C): 36.6 (11 Jan 2024 17:37), Max: 36.6 (11 Jan 2024 17:37)  T(F): 97.8 (11 Jan 2024 17:37), Max: 97.8 (11 Jan 2024 17:37)  HR: 105 (12 Jan 2024 07:35) (92 - 117)  BP: 140/68 (12 Jan 2024 07:35) (126/72 - 145/74)  BP(mean): 98 (12 Jan 2024 06:09) (98 - 98)  RR: 18 (12 Jan 2024 07:35) (15 - 18)  SpO2: 99% (12 Jan 2024 07:35) (97% - 99%)    Parameters below as of 12 Jan 2024 07:35  Patient On (Oxygen Delivery Method): room air        PHYSICAL EXAM:  CONSTITUTIONAL: Well nourished.  NAD, saturating well on room air  ENT:  Airway patent, No thrush, moist mucous membrane, no lesions, enlarged tonsils appreciated , not erythematous.  CARDIAC: Tachycardic, normal rhythm, no murmurs appreciated.  RESPIRATORY: Vesicular breath sounds appreciated throughout, no wheezes or crackles.  GASTROINTESTINAL: Normal Bowel sounds appreciated.  NEUROLOGICAL: Alert and oriented, no motor deficits.    LABS:  CBC   01-12-24 @ 08:43  Hematcorit 28.8  Hemoglobin 9.1  Mean Cell Hemoglobin 23.5  Platelet Count-Automated 475  RBC Count 3.88  Red Cell Distrib Width 16.0  Wbc Count 7.32  01-11-24 @ 19:20  Hematcorit 29.5  Hemoglobin 9.2  Mean Cell Hemoglobin 23.2  Platelet Count-Automated 510  RBC Count 3.96  Red Cell Distrib Width 15.5  Wbc Count 9.17      BMP  01-12-24 @ 08:43  Anion Gap. Serum 9  Blood Urea Nitrogen,Serm 14  Calcium, Total Serum 9.2  Carbon Dioxide, Serum 26  Chloride, Serum 105  Creatinine, Serum 1.2  eGFR in  --  eGFR in Non Afican American --  Gloucose, serum 118  Potassium, Serum 4.5  Sodium, Serum 140              01-11-24 @ 19:20  Anion Gap. Serum 13  Blood Urea Nitrogen,Serm 14  Calcium, Total Serum 9.1  Carbon Dioxide, Serum 23  Chloride, Serum 103  Creatinine, Serum 1.0  eGFR in  --  eGFR in Non Afican American --  Gloucose, serum 118  Potassium, Serum 4.7  Sodium, Serum 139                  CMP  01-12-24 @ 08:43  Kaylee Aminotransferase(ALT/SGPT)22  Albumin, Serum 4.0  Alkaline Phosphatase, Serum 59  Anion Gap, Serum 9  Aspartate Aminotransferase (AST/SGOT)16  Bilirubin Total, Serum 0.3  Blood Urea Nitrogen, Serum 14  Calcium,Total Serum 9.2  Carbon Dioxide, Serum 26  Chloride, Serum 105  Creatinine, Serum 1.2  eGFR if  --  eGFR if Non African American --  Glucose, Serum 118  Potassium, Serum 4.5  Protein Total, Serum 6.2  Sodium, Serum 140                      01-11-24 @ 19:20  Kaylee Aminotransferase(ALT/SGPT)--  Albumin, Serum --  Alkaline Phosphatase, Serum --  Anion Gap, Serum 13  Aspartate Aminotransferase (AST/SGOT)--  Bilirubin Total, Serum --  Blood Urea Nitrogen, Serum 14  Calcium,Total Serum 9.1  Carbon Dioxide, Serum 23  Chloride, Serum 103  Creatinine, Serum 1.0  eGFR if  --  eGFR if Non African American --  Glucose, Serum 118  Potassium, Serum 4.7  Protein Total, Serum --  Sodium, Serum 139                          PT/INR  PT/INR  01-12-24 @ 08:43  INR 1.03  Prothrombin Time Comment --  Prothrobin Time, [id number]      Amylase/Lipase            RADIOLOGY & ADDITIONAL TESTS:    Imaging Personally Reviewed:  [ ] YES  [ ] NO EDDY IRENE  23y  Male      Patient is a 23y old  Male who presents with a chief complaint of       PAST MEDICAL/SURGICAL HISTORY  PAST MEDICAL & SURGICAL HISTORY:  HLD (hyperlipidemia)      No significant past surgical history      REVIEW OF SYSTEMS:  CONSTITUTIONAL: No fever, weight loss, or fatigue  ENMT:  +Sore throat. No difficulty hearing, tinnitus, vertigo; No sinus or throat pain  RESPIRATORY: cough, hemoptysis, no SOB  GASTROINTESTINAL: No abdominal or epigastric pain. No nausea, vomiting, or hematemesis; No diarrhea or constipation. No melena or hematochezia.  LYMPH NODES: No enlarged glands  ENDOCRINE: No heat or cold intolerance; No hair loss  MUSCULOSKELETAL: No joint pain or swelling; No muscle, back, or extremity pain      T(C): 36.6 (01-11-24 @ 17:37), Max: 36.6 (01-11-24 @ 17:37)  HR: 105 (01-12-24 @ 07:35) (92 - 117)  BP: 140/68 (01-12-24 @ 07:35) (126/72 - 145/74)  RR: 18 (01-12-24 @ 07:35) (15 - 18)  SpO2: 99% (01-12-24 @ 07:35) (97% - 99%)  Wt(kg): --Vital Signs Last 24 Hrs  T(C): 36.6 (11 Jan 2024 17:37), Max: 36.6 (11 Jan 2024 17:37)  T(F): 97.8 (11 Jan 2024 17:37), Max: 97.8 (11 Jan 2024 17:37)  HR: 105 (12 Jan 2024 07:35) (92 - 117)  BP: 140/68 (12 Jan 2024 07:35) (126/72 - 145/74)  BP(mean): 98 (12 Jan 2024 06:09) (98 - 98)  RR: 18 (12 Jan 2024 07:35) (15 - 18)  SpO2: 99% (12 Jan 2024 07:35) (97% - 99%)    Parameters below as of 12 Jan 2024 07:35  Patient On (Oxygen Delivery Method): room air        PHYSICAL EXAM:  CONSTITUTIONAL: Well nourished.  NAD, saturating well on room air  ENT:  Airway patent, No thrush, moist mucous membrane, no lesions, enlarged tonsils appreciated , not erythematous.  CARDIAC: Tachycardic, normal rhythm, no murmurs appreciated.  RESPIRATORY: Vesicular breath sounds appreciated throughout, no wheezes or crackles.  GASTROINTESTINAL: Normal Bowel sounds appreciated.  NEUROLOGICAL: Alert and oriented, no motor deficits.    LABS:  CBC   01-12-24 @ 08:43  Hematcorit 28.8  Hemoglobin 9.1  Mean Cell Hemoglobin 23.5  Platelet Count-Automated 475  RBC Count 3.88  Red Cell Distrib Width 16.0  Wbc Count 7.32  01-11-24 @ 19:20  Hematcorit 29.5  Hemoglobin 9.2  Mean Cell Hemoglobin 23.2  Platelet Count-Automated 510  RBC Count 3.96  Red Cell Distrib Width 15.5  Wbc Count 9.17      BMP  01-12-24 @ 08:43  Anion Gap. Serum 9  Blood Urea Nitrogen,Serm 14  Calcium, Total Serum 9.2  Carbon Dioxide, Serum 26  Chloride, Serum 105  Creatinine, Serum 1.2  eGFR in  --  eGFR in Non Afican American --  Gloucose, serum 118  Potassium, Serum 4.5  Sodium, Serum 140              01-11-24 @ 19:20  Anion Gap. Serum 13  Blood Urea Nitrogen,Serm 14  Calcium, Total Serum 9.1  Carbon Dioxide, Serum 23  Chloride, Serum 103  Creatinine, Serum 1.0  eGFR in  --  eGFR in Non Afican American --  Gloucose, serum 118  Potassium, Serum 4.7  Sodium, Serum 139                  CMP  01-12-24 @ 08:43  Kaylee Aminotransferase(ALT/SGPT)22  Albumin, Serum 4.0  Alkaline Phosphatase, Serum 59  Anion Gap, Serum 9  Aspartate Aminotransferase (AST/SGOT)16  Bilirubin Total, Serum 0.3  Blood Urea Nitrogen, Serum 14  Calcium,Total Serum 9.2  Carbon Dioxide, Serum 26  Chloride, Serum 105  Creatinine, Serum 1.2  eGFR if  --  eGFR if Non African American --  Glucose, Serum 118  Potassium, Serum 4.5  Protein Total, Serum 6.2  Sodium, Serum 140                      01-11-24 @ 19:20  Kaylee Aminotransferase(ALT/SGPT)--  Albumin, Serum --  Alkaline Phosphatase, Serum --  Anion Gap, Serum 13  Aspartate Aminotransferase (AST/SGOT)--  Bilirubin Total, Serum --  Blood Urea Nitrogen, Serum 14  Calcium,Total Serum 9.1  Carbon Dioxide, Serum 23  Chloride, Serum 103  Creatinine, Serum 1.0  eGFR if  --  eGFR if Non African American --  Glucose, Serum 118  Potassium, Serum 4.7  Protein Total, Serum --  Sodium, Serum 139                          PT/INR  PT/INR  01-12-24 @ 08:43  INR 1.03  Prothrombin Time Comment --  Prothrobin Time, [id number]      Amylase/Lipase            RADIOLOGY & ADDITIONAL TESTS:    Imaging Personally Reviewed:  [ ] YES  [ ] NO

## 2024-01-12 NOTE — CONSULT NOTE ADULT - ASSESSMENT
Pt is a 23 year old male with a pmh HLD, ADHD, KIMMIE presents for eval of cough w hemoptysis for the past 3 weeks - initially seen a few weeks ago on [place] Day in the ED for these complaints and states that his symptoms are worse at night and have improved with tessalon pearls, returns again with continued hemoptysis and was reportedly planning on following up with ENT for evaluation of enlarged tonsils. Currently being worked up for r/o TB by Pulmonary team.     Case and imaging discussed with Dr Simmons, recommendations below:  - No acute ENT/surgical intervention indicated at this time; no evidence of nasal, oral or laryngeal bleeding source  - Continue care per Pulmonary Team and r/o TB workup  - CT chest: Minimal left upper lobe groundglass opacities, which may be inflammatory or infectious in etiology. No evidence of acute pulmonary embolism.  - Continue care per primary team  - Recall prn Pt is a 23 year old male with a pmh HLD, ADHD, KIMMIE presents for eval of cough w hemoptysis for the past 3 weeks - initially seen a few weeks ago on [place] Day in the ED for these complaints and states that his symptoms are worse at night and have improved with tessalon pearls, returns again with continued hemoptysis and was reportedly planning on following up with ENT for evaluation of enlarged tonsils. Currently being worked up for r/o TB by Pulmonary team.     Case and imaging discussed with Dr Simmons, recommendations below:  - No acute ENT/surgical intervention indicated at this time; no evidence of nasal, oral or laryngeal bleeding source  - Continue care per Pulmonary Team and r/o TB workup  - CT chest: Minimal left upper lobe groundglass opacities, which may be inflammatory or infectious in etiology. No evidence of acute pulmonary embolism.  - Continue care per primary team  - Recall prn Pt is a 23 year old male with a pmh HLD, ADHD, KIMMIE presents for eval of cough w hemoptysis for the past 3 weeks - initially seen a few weeks ago on [place] Day in the ED for these complaints and states that his symptoms are worse at night and have improved with tessalon pearls, returns again with continued hemoptysis and was reportedly planning on following up with ENT for evaluation of enlarged tonsils. Currently being worked up for r/o TB by Pulmonary team.     Case and imaging discussed with Dr Simmons, recommendations below:  - No acute ENT/surgical intervention indicated at this time; no evidence of nasal, oral or laryngeal bleeding source  - Continue care per Pulmonary Team and r/o TB workup  - CT chest: Minimal left upper lobe groundglass opacities, which may be inflammatory or infectious in etiology. No evidence of acute pulmonary embolism.  - Continue care per primary team  - Recall prn Pt is a 23 year old male with a pmh HLD, ADHD, KIMMIE presents for eval of cough w hemoptysis for the past 3 weeks - initially seen a few weeks ago on [place] Day in the ED for these complaints and states that his symptoms are worse at night and have improved with tessalon pearls, returns again with continued hemoptysis and was reportedly planning on following up with ENT for evaluation of enlarged tonsils. Currently being worked up for r/o TB by Pulmonary team.     Case and imaging discussed with Dr Simmons, recommendations below:  - No acute ENT/surgical intervention indicated at this time; no evidence of nasal, oral or laryngeal bleeding source  - Continue care per Pulmonary Team and r/o TB workup  - CT chest: Minimal left upper lobe groundglass opacities, which may be inflammatory or infectious in etiology. No evidence of acute pulmonary embolism.  - Continue care per primary team  - Recall prn

## 2024-01-12 NOTE — CONSULT NOTE ADULT - SUBJECTIVE AND OBJECTIVE BOX
ENT Consult Note:   Pt is a 23 year old male with a pmh HLD, ADHD, KIMMIE presents for eval of cough w hemoptysis for the past 3 weeks. ENT called for further evaluation. Patient seen and examined at bedside in the ED - states that he was initially seen a few weeks ago on Maryann Day in the ED for these complaints and states that his symptoms are worse at night and have improved with tessalon pearls. Patient also endorsed a nosebleed after sneezing at that time which self resolved and then had 1 episode of dark brown vomitus and hoarse voice. Pt was subsequently discharged on prednisone and albuterol with a dx of bronchitis but again presented to [facility] on 12/31 for same complaints. Pt returns again with continued hemoptysis and was reportedly planning on following up with ENT for evaluation of enlarged tonsils. Denies further fevers, chills, further n/v, SOB, weight loss, malaise, recent travel outside of the [place]. Pt currently being worked up for r/o TB by Pulmonary team. CT chest: Minimal left upper lobe groundglass opacities, which may be inflammatory or infectious in etiology. No evidence of acute pulmonary embolism; S/p ceftriaxone +azithromycin in ED.    [ x ] A 10 Point Review of Systems was negative except where noted    PAST MEDICAL & SURGICAL HISTORY:  HLD (hyperlipidemia)  No significant past surgical history    Allergies: No Known Allergies    MEDICATIONS  (STANDING):  pantoprazole    Tablet 40 milliGRAM(s) Oral before breakfast    Vital Signs Last 24 Hrs  T(C): 36.6 (11 Jan 2024 17:37), Max: 36.6 (11 Jan 2024 17:37)  T(F): 97.8 (11 Jan 2024 17:37), Max: 97.8 (11 Jan 2024 17:37)  HR: 105 (12 Jan 2024 07:35) (92 - 117)  BP: 140/68 (12 Jan 2024 07:35) (126/72 - 145/74)  BP(mean): 98 (12 Jan 2024 06:09) (98 - 98)  RR: 18 (12 Jan 2024 07:35) (15 - 18)  SpO2: 99% (12 Jan 2024 07:35) (97% - 99%)    Parameters below as of 12 Jan 2024 07:35  Patient On (Oxygen Delivery Method): room air    PHYSICAL EXAM:  Gen: Well-developed, well-nourished, NAD.  No drooling or pooling of secretions. Mildly hoarse voice.   Skin: Good color, non diaphoretic  Head: NC/AT.   EENT: Nares bilaterally patent, no blood or discharge noted. Oral cavity no erythema/edema. Tongue wnl, uvula midline, no tenderness throughout FOM. Posterior oropharynx clear, no blood/discharge noted.   Neck: Trachea midline, supple  Resp: Currently on CPAP, SpO2 100%, Breathing easily, no accessory muscle use, no stridor or stertor.    Cardio: +S1/S2  Abd: Soft, nontender, nondistended  Neuro: Awake and alert  Psych: Normal mood, normal affect  Ext: No peripheral edema/cyanosis, PRAJAPATI x 4    Fiberoptic Laryngoscopy: No masses or lesions noted to NP/OP/HP. Laryngeal structures intact, no edema or erythema noted. Epiglottis crisp, no edema. TVC/FVC mobile and intact, no glottic gap noted. No evidence of active bleeding throughout assessment.       LABS:                      9.1    7.32  )-----------( 475      ( 12 Jan 2024 08:43 )             28.8     01-12  140  |  105  |  14  ----------------------------<  118<H>  4.5   |  26  |  1.2    Ca    9.2      12 Jan 2024 08:43    TPro  6.2  /  Alb  4.0  /  TBili  0.3  /  DBili  x   /  AST  16  /  ALT  22  /  AlkPhos  59  01-12    PT/INR - ( 12 Jan 2024 08:43 )   PT: 11.80 sec;   INR: 1.03 ratio      PTT - ( 12 Jan 2024 08:43 )  PTT:31.3 sec    Urinalysis Basic - ( 12 Jan 2024 08:43 )  Color: x / Appearance: x / SG: x / pH: x  Gluc: 118 mg/dL / Ketone: x  / Bili: x / Urobili: x   Blood: x / Protein: x / Nitrite: x   Leuk Esterase: x / RBC: x / WBC x   Sq Epi: x / Non Sq Epi: x / Bacteria: x      IMAGING/ADDITIONAL STUDIES:   ACC: [id number] EXAM:  CT ANGIO CHEST PULM ART WAWIC   ORDERED BY: JAY HARRISON     PROCEDURE DATE:  01/11/2024      INTERPRETATION:  CLINICAL HISTORY/REASON FOR EXAM: Hemoptysis.    TECHNIQUE: Multislice helical sections were obtained from the thoracic   inlet to the lung bases during rapid administration of 80 cc Omnipaque   350 intravenous contrast. Thin sections were reconstructed through the   pulmonary vasculature. 3D (MIP) reformats obtained. 20 cc contrast   discarded.    COMPARISON: None.    FINDINGS:    PULMONARY EMBOLUS: No evidence of acute pulmonary embolism.    LUNGS, PLEURA, AIRWAYS: No lobar consolidation, mass, effusion, or   pneumothorax. No evidence of central endobronchial obstruction. No   bronchiectasis or honeycombing. No suspicious pulmonary nodule. Minimal   left upper lobe groundglass opacities, which may be inflammatory or   infectious in etiology.    THORACIC NODES: No mediastinal, hilar, supraclavicular, or axillary   lymphadenopathy.    MEDIASTINUM/GREAT VESSELS: No pericardial effusion. Heart size is within   normal limits. The aorta and main pulmonary artery are of normal caliber.    BONES/SOFT TISSUES: Unremarkable.    VISUALIZED UPPER ABDOMEN: Unremarkable.      IMPRESSION:    1. Minimal left upper lobe groundglass opacities, which may be   inflammatory or infectious in etiology.    2. No evidence of acute pulmonary embolism.    --- End of Report ---    OTTO ZAMORA MD; Attending Radiologist  This document has been electronically signed. Jan 11 2024  8:24PM   ENT Consult Note:   Pt is a 23 year old male with a pmh HLD, ADHD, KIMMIE presents for eval of cough w hemoptysis for the past 3 weeks. ENT called for further evaluation. Patient seen and examined at bedside in the ED - states that he was initially seen a few weeks ago on Maryann Day in the ED for these complaints and states that his symptoms are worse at night and have improved with tessalon pearls. Patient also endorsed a nosebleed after sneezing at that time which self resolved and then had 1 episode of dark brown vomitus and hoarse voice. Pt was subsequently discharged on prednisone and albuterol with a dx of bronchitis but again presented to [facility] on 12/31 for same complaints. Pt returns again with continued hemoptysis and was reportedly planning on following up with ENT for evaluation of enlarged tonsils. Denies further fevers, chills, further n/v, SOB, weight loss, malaise, recent travel outside of the [place]. Pt currently being worked up for r/o TB by Pulmonary team. CT chest: Minimal left upper lobe groundglass opacities, which may be inflammatory or infectious in etiology. No evidence of acute pulmonary embolism; S/p ceftriaxone +azithromycin in ED.    [ x ] A 10 Point Review of Systems was negative except where noted    PAST MEDICAL & SURGICAL HISTORY:  HLD (hyperlipidemia)  No significant past surgical history    Allergies: No Known Allergies    MEDICATIONS  (STANDING):  pantoprazole    Tablet 40 milliGRAM(s) Oral before breakfast    Vital Signs Last 24 Hrs  T(C): 36.6 (11 Jan 2024 17:37), Max: 36.6 (11 Jan 2024 17:37)  T(F): 97.8 (11 Jan 2024 17:37), Max: 97.8 (11 Jan 2024 17:37)  HR: 105 (12 Jan 2024 07:35) (92 - 117)  BP: 140/68 (12 Jan 2024 07:35) (126/72 - 145/74)  BP(mean): 98 (12 Jan 2024 06:09) (98 - 98)  RR: 18 (12 Jan 2024 07:35) (15 - 18)  SpO2: 99% (12 Jan 2024 07:35) (97% - 99%)    Parameters below as of 12 Jan 2024 07:35  Patient On (Oxygen Delivery Method): room air    PHYSICAL EXAM:  Gen: Well-developed, well-nourished, NAD.  No drooling or pooling of secretions. Mildly hoarse voice.   Skin: Good color, non diaphoretic  Head: NC/AT.   EENT: Nares bilaterally patent, no blood or discharge noted. Oral cavity no erythema/edema. Tongue wnl, uvula midline, no tenderness throughout FOM. Posterior oropharynx clear, no blood/discharge noted.   Neck: Trachea midline, supple  Resp: Currently on CPAP, SpO2 100%, Breathing easily, no accessory muscle use, no stridor or stertor.    Cardio: +S1/S2  Abd: Soft, nontender, nondistended  Neuro: Awake and alert  Psych: Normal mood, normal affect  Ext: No peripheral edema/cyanosis, PRAJAPATI x 4    Fiberoptic Laryngoscopy: No masses or lesions noted to NP/OP/HP. Laryngeal structures intact, no edema or erythema noted. Epiglottis crisp, no edema. TVC/FVC mobile and intact, no glottic gap noted. No evidence of active bleeding throughout assessment.       LABS:                      9.1    7.32  )-----------( 475      ( 12 Jan 2024 08:43 )             28.8     01-12  140  |  105  |  14  ----------------------------<  118<H>  4.5   |  26  |  1.2    Ca    9.2      12 Jan 2024 08:43    TPro  6.2  /  Alb  4.0  /  TBili  0.3  /  DBili  x   /  AST  16  /  ALT  22  /  AlkPhos  59  01-12    PT/INR - ( 12 Jan 2024 08:43 )   PT: 11.80 sec;   INR: 1.03 ratio      PTT - ( 12 Jan 2024 08:43 )  PTT:31.3 sec    Urinalysis Basic - ( 12 Jan 2024 08:43 )  Color: x / Appearance: x / SG: x / pH: x  Gluc: 118 mg/dL / Ketone: x  / Bili: x / Urobili: x   Blood: x / Protein: x / Nitrite: x   Leuk Esterase: x / RBC: x / WBC x   Sq Epi: x / Non Sq Epi: x / Bacteria: x      IMAGING/ADDITIONAL STUDIES:   ACC: [id number] EXAM:  CT ANGIO CHEST PULM ART WAWIC   ORDERED BY: JAY HARRISON     PROCEDURE DATE:  01/11/2024      INTERPRETATION:  CLINICAL HISTORY/REASON FOR EXAM: Hemoptysis.    TECHNIQUE: Multislice helical sections were obtained from the thoracic   inlet to the lung bases during rapid administration of 80 cc Omnipaque   350 intravenous contrast. Thin sections were reconstructed through the   pulmonary vasculature. 3D (MIP) reformats obtained. 20 cc contrast   discarded.    COMPARISON: None.    FINDINGS:    PULMONARY EMBOLUS: No evidence of acute pulmonary embolism.    LUNGS, PLEURA, AIRWAYS: No lobar consolidation, mass, effusion, or   pneumothorax. No evidence of central endobronchial obstruction. No   bronchiectasis or honeycombing. No suspicious pulmonary nodule. Minimal   left upper lobe groundglass opacities, which may be inflammatory or   infectious in etiology.    THORACIC NODES: No mediastinal, hilar, supraclavicular, or axillary   lymphadenopathy.    MEDIASTINUM/GREAT VESSELS: No pericardial effusion. Heart size is within   normal limits. The aorta and main pulmonary artery are of normal caliber.    BONES/SOFT TISSUES: Unremarkable.    VISUALIZED UPPER ABDOMEN: Unremarkable.      IMPRESSION:    1. Minimal left upper lobe groundglass opacities, which may be   inflammatory or infectious in etiology.    2. No evidence of acute pulmonary embolism.    --- End of Report ---    OTTO ZAMORA MD; Attending Radiologist  This document has been electronically signed. Jan 11 2024  8:24PM   ENT Consult Note:   Pt is a 23 year old male with a pmh HLD, ADHD, KIMMIE presents for eval of cough w hemoptysis for the past 3 weeks. ENT called for further evaluation. Patient seen and examined at bedside in the ED - states that he was initially seen a few weeks ago on Maryann Day in the ED for these complaints and states that his symptoms are worse at night and have improved with tessalon pearls. Patient also endorsed a nosebleed after sneezing at that time which self resolved and then had 1 episode of dark brown vomitus and hoarse voice. Pt was subsequently discharged on prednisone and albuterol with a dx of bronchitis but again presented to [facility] on 12/31 for same complaints. Pt returns again with continued hemoptysis and was reportedly planning on following up with ENT for evaluation of enlarged tonsils. Denies further fevers, chills, further n/v, SOB, weight loss, malaise, recent travel outside of the [place]. Pt currently being worked up for r/o TB by Pulmonary team. CT chest: Minimal left upper lobe groundglass opacities, which may be inflammatory or infectious in etiology. No evidence of acute pulmonary embolism; S/p ceftriaxone +azithromycin in ED.    [ x ] A 10 Point Review of Systems was negative except where noted    PAST MEDICAL & SURGICAL HISTORY:  HLD (hyperlipidemia)  No significant past surgical history    Allergies: No Known Allergies    MEDICATIONS  (STANDING):  pantoprazole    Tablet 40 milliGRAM(s) Oral before breakfast    Vital Signs Last 24 Hrs  T(C): 36.6 (11 Jan 2024 17:37), Max: 36.6 (11 Jan 2024 17:37)  T(F): 97.8 (11 Jan 2024 17:37), Max: 97.8 (11 Jan 2024 17:37)  HR: 105 (12 Jan 2024 07:35) (92 - 117)  BP: 140/68 (12 Jan 2024 07:35) (126/72 - 145/74)  BP(mean): 98 (12 Jan 2024 06:09) (98 - 98)  RR: 18 (12 Jan 2024 07:35) (15 - 18)  SpO2: 99% (12 Jan 2024 07:35) (97% - 99%)    Parameters below as of 12 Jan 2024 07:35  Patient On (Oxygen Delivery Method): room air    PHYSICAL EXAM:  Gen: Well-developed, well-nourished, NAD.  No drooling or pooling of secretions. Mildly hoarse voice.   Skin: Good color, non diaphoretic  Head: NC/AT.   EENT: Nares bilaterally patent, no blood or discharge noted. Oral cavity no erythema/edema. Tongue wnl, uvula midline, no tenderness throughout FOM. Posterior oropharynx clear, no blood/discharge noted.   Neck: Trachea midline, supple  Resp: Currently on CPAP, SpO2 100%, Breathing easily, no accessory muscle use, no stridor or stertor.    Cardio: +S1/S2  Abd: Soft, nontender, nondistended  Neuro: Awake and alert  Psych: Normal mood, normal affect  Ext: No peripheral edema/cyanosis, PRAJAPATI x 4    Fiberoptic Laryngoscopy: No masses or lesions noted to NP/OP/HP. Laryngeal structures intact, no edema or erythema noted. Epiglottis crisp, no edema. TVC/FVC mobile and intact, no glottic gap noted. No evidence of active bleeding throughout assessment.       LABS:                      9.1    7.32  )-----------( 475      ( 12 Jan 2024 08:43 )             28.8     01-12  140  |  105  |  14  ----------------------------<  118<H>  4.5   |  26  |  1.2    Ca    9.2      12 Jan 2024 08:43    TPro  6.2  /  Alb  4.0  /  TBili  0.3  /  DBili  x   /  AST  16  /  ALT  22  /  AlkPhos  59  01-12    PT/INR - ( 12 Jan 2024 08:43 )   PT: 11.80 sec;   INR: 1.03 ratio      PTT - ( 12 Jan 2024 08:43 )  PTT:31.3 sec    Urinalysis Basic - ( 12 Jan 2024 08:43 )  Color: x / Appearance: x / SG: x / pH: x  Gluc: 118 mg/dL / Ketone: x  / Bili: x / Urobili: x   Blood: x / Protein: x / Nitrite: x   Leuk Esterase: x / RBC: x / WBC x   Sq Epi: x / Non Sq Epi: x / Bacteria: x      IMAGING/ADDITIONAL STUDIES:   ACC: [id number] EXAM:  CT ANGIO CHEST PULM ART WAWIC   ORDERED BY: AJY HARRISON     PROCEDURE DATE:  01/11/2024      INTERPRETATION:  CLINICAL HISTORY/REASON FOR EXAM: Hemoptysis.    TECHNIQUE: Multislice helical sections were obtained from the thoracic   inlet to the lung bases during rapid administration of 80 cc Omnipaque   350 intravenous contrast. Thin sections were reconstructed through the   pulmonary vasculature. 3D (MIP) reformats obtained. 20 cc contrast   discarded.    COMPARISON: None.    FINDINGS:    PULMONARY EMBOLUS: No evidence of acute pulmonary embolism.    LUNGS, PLEURA, AIRWAYS: No lobar consolidation, mass, effusion, or   pneumothorax. No evidence of central endobronchial obstruction. No   bronchiectasis or honeycombing. No suspicious pulmonary nodule. Minimal   left upper lobe groundglass opacities, which may be inflammatory or   infectious in etiology.    THORACIC NODES: No mediastinal, hilar, supraclavicular, or axillary   lymphadenopathy.    MEDIASTINUM/GREAT VESSELS: No pericardial effusion. Heart size is within   normal limits. The aorta and main pulmonary artery are of normal caliber.    BONES/SOFT TISSUES: Unremarkable.    VISUALIZED UPPER ABDOMEN: Unremarkable.      IMPRESSION:    1. Minimal left upper lobe groundglass opacities, which may be   inflammatory or infectious in etiology.    2. No evidence of acute pulmonary embolism.    --- End of Report ---    OTTO ZAMORA MD; Attending Radiologist  This document has been electronically signed. Jan 11 2024  8:24PM   ENT Consult Note:   Pt is a 23 year old male with a pmh HLD, ADHD, KIMMIE presents for eval of cough w hemoptysis for the past 3 weeks. ENT called for further evaluation. Patient seen and examined at bedside in the ED - states that he was initially seen a few weeks ago on Maryann Day in the ED for these complaints and states that his symptoms are worse at night and have improved with tessalon pearls. Patient also endorsed a nosebleed after sneezing at that time which self resolved and then had 1 episode of dark brown vomitus and hoarse voice. Pt was subsequently discharged on prednisone and albuterol with a dx of bronchitis but again presented to [facility] on 12/31 for same complaints. Pt returns again with continued hemoptysis and was reportedly planning on following up with ENT for evaluation of enlarged tonsils. Denies further fevers, chills, further n/v, SOB, weight loss, malaise, recent travel outside of the [place]. Pt currently being worked up for r/o TB by Pulmonary team. CT chest: Minimal left upper lobe groundglass opacities, which may be inflammatory or infectious in etiology. No evidence of acute pulmonary embolism; S/p ceftriaxone +azithromycin in ED.    [ x ] A 10 Point Review of Systems was negative except where noted    PAST MEDICAL & SURGICAL HISTORY:  HLD (hyperlipidemia)  No significant past surgical history    Allergies: No Known Allergies    MEDICATIONS  (STANDING):  pantoprazole    Tablet 40 milliGRAM(s) Oral before breakfast    Vital Signs Last 24 Hrs  T(C): 36.6 (11 Jan 2024 17:37), Max: 36.6 (11 Jan 2024 17:37)  T(F): 97.8 (11 Jan 2024 17:37), Max: 97.8 (11 Jan 2024 17:37)  HR: 105 (12 Jan 2024 07:35) (92 - 117)  BP: 140/68 (12 Jan 2024 07:35) (126/72 - 145/74)  BP(mean): 98 (12 Jan 2024 06:09) (98 - 98)  RR: 18 (12 Jan 2024 07:35) (15 - 18)  SpO2: 99% (12 Jan 2024 07:35) (97% - 99%)    Parameters below as of 12 Jan 2024 07:35  Patient On (Oxygen Delivery Method): room air    PHYSICAL EXAM:  Gen: Well-developed, well-nourished, NAD.  No drooling or pooling of secretions. Mildly hoarse voice.   Skin: Good color, non diaphoretic  Head: NC/AT.   EENT: Nares bilaterally patent, no blood or discharge noted. Oral cavity no erythema/edema. Tongue wnl, uvula midline, no tenderness throughout FOM. Posterior oropharynx clear, no blood/discharge noted.   Neck: Trachea midline, supple  Resp: Currently on CPAP, SpO2 100%, Breathing easily, no accessory muscle use, no stridor or stertor.    Cardio: +S1/S2  Abd: Soft, nontender, nondistended  Neuro: Awake and alert  Psych: Normal mood, normal affect  Ext: No peripheral edema/cyanosis, PRAJAPATI x 4    Fiberoptic Laryngoscopy: No masses or lesions noted to NP/OP/HP. Laryngeal structures intact, no edema or erythema noted. Epiglottis crisp, no edema. TVC/FVC mobile and intact, no glottic gap noted. No evidence of active bleeding throughout assessment.       LABS:                      9.1    7.32  )-----------( 475      ( 12 Jan 2024 08:43 )             28.8     01-12  140  |  105  |  14  ----------------------------<  118<H>  4.5   |  26  |  1.2    Ca    9.2      12 Jan 2024 08:43    TPro  6.2  /  Alb  4.0  /  TBili  0.3  /  DBili  x   /  AST  16  /  ALT  22  /  AlkPhos  59  01-12    PT/INR - ( 12 Jan 2024 08:43 )   PT: 11.80 sec;   INR: 1.03 ratio      PTT - ( 12 Jan 2024 08:43 )  PTT:31.3 sec    Urinalysis Basic - ( 12 Jan 2024 08:43 )  Color: x / Appearance: x / SG: x / pH: x  Gluc: 118 mg/dL / Ketone: x  / Bili: x / Urobili: x   Blood: x / Protein: x / Nitrite: x   Leuk Esterase: x / RBC: x / WBC x   Sq Epi: x / Non Sq Epi: x / Bacteria: x      IMAGING/ADDITIONAL STUDIES:   ACC: [id number] EXAM:  CT ANGIO CHEST PULM ART WAWIC   ORDERED BY: JAY HARRISON     PROCEDURE DATE:  01/11/2024      INTERPRETATION:  CLINICAL HISTORY/REASON FOR EXAM: Hemoptysis.    TECHNIQUE: Multislice helical sections were obtained from the thoracic   inlet to the lung bases during rapid administration of 80 cc Omnipaque   350 intravenous contrast. Thin sections were reconstructed through the   pulmonary vasculature. 3D (MIP) reformats obtained. 20 cc contrast   discarded.    COMPARISON: None.    FINDINGS:    PULMONARY EMBOLUS: No evidence of acute pulmonary embolism.    LUNGS, PLEURA, AIRWAYS: No lobar consolidation, mass, effusion, or   pneumothorax. No evidence of central endobronchial obstruction. No   bronchiectasis or honeycombing. No suspicious pulmonary nodule. Minimal   left upper lobe groundglass opacities, which may be inflammatory or   infectious in etiology.    THORACIC NODES: No mediastinal, hilar, supraclavicular, or axillary   lymphadenopathy.    MEDIASTINUM/GREAT VESSELS: No pericardial effusion. Heart size is within   normal limits. The aorta and main pulmonary artery are of normal caliber.    BONES/SOFT TISSUES: Unremarkable.    VISUALIZED UPPER ABDOMEN: Unremarkable.      IMPRESSION:    1. Minimal left upper lobe groundglass opacities, which may be   inflammatory or infectious in etiology.    2. No evidence of acute pulmonary embolism.    --- End of Report ---    OTTO ZAMORA MD; Attending Radiologist  This document has been electronically signed. Jan 11 2024  8:24PM

## 2024-01-12 NOTE — DISCHARGE NOTE PROVIDER - NSDCCPCAREPLAN_GEN_ALL_CORE_FT
PRINCIPAL DISCHARGE DIAGNOSIS  Diagnosis: Hemoptysis  Assessment and Plan of Treatment: You were admitted for coughing up blood. The ENT and lung doctors evaluated you. This is unlikely from tuberculosis but may be from a previous viral infection. Please follow up with the lung doctor for bronchoscopy to further assess. Please follow up with your PCP to evaluated for any source of anemia.

## 2024-01-12 NOTE — CONSULT NOTE ADULT - ASSESSMENT
Hemoptysis 2/2 Viral Bronchitis  -2 week duration of a cough with bloody sputum, no fevers  -treated as bronchitis in [place], with symptomatic improvement but not complete resolution  -no risk factors for TB: no history of travel to endemic areas, incarceration, or ill contacts  -clinical picture inconsistent with TB: no night sweats, constitutional weakness, or weight loss    Recommendations  ·	D/C isolation precautions  ·	follow up pulmonology recommendations for management of viral bronchitis and possible bronchoscopy   Hemoptysis 2/2 Viral Bronchitis  -2 week duration of a cough with bloody sputum, no fevers  -treated as bronchitis in [place], with symptomatic improvement but not complete resolution  -no risk factors for TB: no history of travel to endemic areas, incarceration, or ill contacts  -clinical picture inconsistent with TB: no night sweats, constitutional weakness, or weight loss    Recommendations  ·	D/C isolation precautions  ·	follow up pulmonology recommendations for management of viral bronchitis and possible bronchoscopy

## 2024-01-12 NOTE — DISCHARGE NOTE PROVIDER - HOSPITAL COURSE
23y M pmh HLD  ADHD  KIMMIE presents fOR eval of cough w hemoptysis  Pt has intermittent productive blood tinged cough x2wks, worse at night, improved with tessalon pearls.  Says it 1st started around bhavna. Initially had vomiting w vomitus being pepsi colored. Also had an episode of nose bleed that day when he sneezed.  Has a hoarse voice at encounter, says has been going on since he got sick    Initially presented to ED on 12/25, had fever and dyspnea then, was discharged on prednisone 40 x 7d and  albuterol w a dx of bronchitis.    was later seen at [place] on 12/31 for same complaints   wbc 18 then (steroids), elev dimer; hemoglobin 10.7    pulm note from 1/5: Patient also snores a lot as per the mother and underwent sleep study which showed KIMMIE.  was diagnosed w bronchitis and was given an ENT referral for further eval.    Denies current fever / SOB / weight loss / malaise / recent travel ho, never left [place]. [place] chest, the patient is found to have a very small focus of left upper lobe groundglass opacification, unlikely to be consistent with the reported 3+ cups of bloody sputum produced each day.  Patient is stable for discharge to follow-up with Dr. Marcus Justice for outpatient bronchoscopy.  Patient was alerted to warning signs and when to return to the hospital in the event of increased levels of hemoptysis. ENT was consulted, no acute ENT indications at this time wiht no evidence of nasal, oral or laryngeal bleeding. ID was consulted, symptoms not suggestive of pulmonary TB, AFB sputum was collected. Monitored off antibiotics. Patient hemodynamically stable for discharge with follow up.     #Hemoptysis / L upper lobe groundglass opacities  - Unlikely TB- no antibiotics per ID  -Pulm follow up for outpatient bronch  - Supportive care for cough PRN    Anemia  -Follow up outpatient for BETTY workup  - Rec outpatient FOBT, serum iron panel, basic autoimmunity screening with SILVIA and complement levels, UA, UDS    KIMMIE  -OP initiation of BiPAP    Dyslipidemia  - outpatient follow up 23y M pmh HLD  ADHD  KIMMIE presents fOR eval of cough w hemoptysis  Pt has intermittent productive blood tinged cough x2wks, worse at night, improved with tessalon pearls.  Says it 1st started around bhavna. Initially had vomiting w vomitus being pepsi colored. Also had an episode of nose bleed that day when he sneezed.  Has a hoarse voice at encounter, says has been going on since he got sick    Initially presented to ED on 12/25, had fever and dyspnea then, was discharged on prednisone 40 x 7d and  albuterol w a dx of bronchitis.    was later seen at [place] [facility] on 12/31 for same complaints   wbc 18 then (steroids), elev dimer; hemoglobin 10.7    pulm note from 1/5: Patient also snores a lot as per the mother and underwent sleep study which showed KIMMIE.  was diagnosed w bronchitis and was given an ENT referral for further eval.    Denies current fever / SOB / weight loss / malaise / recent travel ho, never left [place]. CT chest, the patient is found to have a very small focus of left upper lobe groundglass opacification, unlikely to be consistent with the reported 3+ cups of bloody sputum produced each day.  Patient is stable for discharge to follow-up with Dr. Marcus Justice for outpatient bronchoscopy.  Patient was alerted to warning signs and when to return to the hospital in the event of increased levels of hemoptysis. ENT was consulted, no acute ENT indications at this time wiht no evidence of nasal, oral or laryngeal bleeding. ID was consulted, symptoms not suggestive of pulmonary TB, AFB sputum was collected. Monitored off antibiotics. Patient hemodynamically stable for discharge with follow up.     #Hemoptysis / L upper lobe groundglass opacities  - Unlikely TB- no antibiotics per ID  -Pulm follow up for outpatient bronch  - Supportive care for cough PRN    Anemia  -Follow up outpatient for BETTY workup  - Rec outpatient FOBT, serum iron panel, basic autoimmunity screening with SILVIA and complement levels, UA, UDS    KIMMIE  -OP initiation of BiPAP    Dyslipidemia  - outpatient follow up

## 2024-01-12 NOTE — DISCHARGE NOTE NURSING/CASE MANAGEMENT/SOCIAL WORK - PATIENT PORTAL LINK FT
You can access the FollowMyHealth Patient Portal offered by [facility] by registering at the following website: [email or web address]. By joining [employer]’s FollowMyHealth portal, you will also be able to view your health information using other applications (apps) compatible with our system. You can access the FollowMyHealth Patient Portal offered by [facility] by registering at the following website: [email or web address]. By joining [employer]’s FollowMyHealth portal, you will also be able to view your health information using other applications (apps) compatible with our system.

## 2024-01-12 NOTE — H&P ADULT - ATTENDING COMMENTS
HPI:  23y M pmh HLD  ADHD  KIMMIE presents fOR eval of cough w hemoptysis  Pt has intermittent productive blood tinged cough x2wks, worse at night, improved with tessalon pearls.  Says it 1st started around bhavna. Initially had vomiting w vomitus being pepsi colored. Also had an episode of nose bleed that day when he sneezed.  Has a hoarse voice at encounter, says has been going on since he got sick    Initially presented to ED on 12/25, had fever and dyspnea then, was discharged on prednisone 40 x 7d and  albuterol w a dx of bronchitis.    was later seen at [place] on 12/31 for same complaints   wbc 18 then (steroids), elev dimer; hemoglobin 10.7    pulm note from 1/5: Patient also snores a lot as per the mother and underwent sleep study which showed KIMMIE.  was diagnosed w bronchitis and was given an ENT referral for further eval.    Denies current fever / SOB / weight loss / malaise / recent travel ho, never left [place]    In ED,   labs: Hb 9.2 (11.1 on 12/25)  CT chest:  1. Minimal left upper lobe groundglass opacities, which may be   inflammatory or infectious in etiology.    2. No evidence of acute pulmonary embolism.      sp ceftriaxone + azithromycin in ED   (12 Jan 2024 01:39)    REVIEW OF SYSTEMS: see cc/HPI   CONSTITUTIONAL: No weakness, fevers or chills  EYES/ENT: No visual changes;  No vertigo (+) throat pain (+) hoarse   NECK: No pain or stiffness  RESPIRATORY: (+) cough, No wheezing, (+)  hemoptysis; No shortness of breath, (+) abnormal CT chest   CARDIOVASCULAR: No chest pain or palpitations  GASTROINTESTINAL: No abdominal or epigastric pain. No nausea, vomiting, or hematemesis; No diarrhea or constipation. No melena or hematochezia.  GENITOURINARY: No dysuria, frequency or hematuria  NEUROLOGICAL: No numbness or weakness  SKIN: No itching, rashes    Physical Exam:   General: WN/WD NAD  Neurology: A&Ox3, nonfocal, follows commands  Eyes: PERRLA/ EOMI  ENT/Neck: Neck supple, trachea midline, No JVD  Respiratory: CTA B/L, No wheezing, rales, rhonchi  CV: (+) Tachycardia, regular rhythm, S1S2, no murmurs, rubs or gallops  Abdominal: Soft, NT, ND +BS,   Extremities: No edema, + peripheral pulses  Skin: No Rashes, Hematoma, Ecchymosis    A/p   Hemoptysis / L upper lobe groundglass opacities r/o TBI, NO PE on CTA  -airborne isolation  -check QuantiFeron TB gold/ sputum for AFB and culture   -ID eval   -Pulm eval     Anemia possible 2/2 blood loss ( although no massive amount of hemoptysis )  -serial CBC and active T&S    KMIMIE  -OP initiation of BiPAP    Dyslipidemia   -statin    DVT prophylaxis HPI:  23y M pmh HLD  ADHD  KIMMIE presents fOR eval of cough w hemoptysis  Pt has intermittent productive blood tinged cough x2wks, worse at night, improved with tessalon pearls.  Says it 1st started around bhavna. Initially had vomiting w vomitus being pepsi colored. Also had an episode of nose bleed that day when he sneezed.  Has a hoarse voice at encounter, says has been going on since he got sick    Initially presented to ED on 12/25, had fever and dyspnea then, was discharged on prednisone 40 x 7d and  albuterol w a dx of bronchitis.    was later seen at [place] on 12/31 for same complaints   wbc 18 then (steroids), elev dimer; hemoglobin 10.7    pulm note from 1/5: Patient also snores a lot as per the mother and underwent sleep study which showed KIMMIE.  was diagnosed w bronchitis and was given an ENT referral for further eval.    Denies current fever / SOB / weight loss / malaise / recent travel ho, never left [place]    In ED,   labs: Hb 9.2 (11.1 on 12/25)  CT chest:  1. Minimal left upper lobe groundglass opacities, which may be   inflammatory or infectious in etiology.    2. No evidence of acute pulmonary embolism.      sp ceftriaxone + azithromycin in ED   (12 Jan 2024 01:39)    REVIEW OF SYSTEMS: see cc/HPI   CONSTITUTIONAL: No weakness, fevers or chills  EYES/ENT: No visual changes;  No vertigo (+) throat pain (+) hoarse   NECK: No pain or stiffness  RESPIRATORY: (+) cough, No wheezing, (+)  hemoptysis; No shortness of breath, (+) abnormal CT chest   CARDIOVASCULAR: No chest pain or palpitations  GASTROINTESTINAL: No abdominal or epigastric pain. No nausea, vomiting, or hematemesis; No diarrhea or constipation. No melena or hematochezia.  GENITOURINARY: No dysuria, frequency or hematuria  NEUROLOGICAL: No numbness or weakness  SKIN: No itching, rashes    Physical Exam:   General: WN/WD NAD  Neurology: A&Ox3, nonfocal, follows commands  Eyes: PERRLA/ EOMI  ENT/Neck: Neck supple, trachea midline, No JVD  Respiratory: CTA B/L, No wheezing, rales, rhonchi  CV: (+) Tachycardia, regular rhythm, S1S2, no murmurs, rubs or gallops  Abdominal: Soft, NT, ND +BS,   Extremities: No edema, + peripheral pulses  Skin: No Rashes, Hematoma, Ecchymosis    A/p   Hemoptysis / L upper lobe groundglass opacities r/o TBI, NO PE on CTA  -airborne isolation  -check QuantiFeron TB gold/ sputum for AFB and culture   -ID eval   -Pulm eval     Anemia possible 2/2 blood loss ( although no massive amount of hemoptysis )  -serial CBC and active T&S    KIMMIE  -OP initiation of BiPAP    Dyslipidemia   -statin    DVT prophylaxis

## 2024-01-12 NOTE — CONSULT NOTE ADULT - ATTENDING COMMENTS
Pt is a 23 year old male with a pmh HLD, ADHD, KIMMIE presents for eval of cough w hemoptysis for the past 3 weeks.    Also seen at [facility] and eventually discharged   No New joint pains/rashes.   No vaping/denies smoking.   No night sweats/weight loss.   No travel     Impression  #Hemoptysis/Bronchitis  - CTA Chest 1/11 - no lymphadenopathy - minimal COURTNEY opacities     #KIMMIE  #ADHD    Recommendations   - symptoms not suggestive of Pulmonary TB -- Low suspicion for now   - can take off isolation precautions   - monitor off antibiotics   - AFB sputum x 1 collected -- will follow -- no need to obtain further sputum   - cough suppressant   - pulm follow-up     Please call or message on [employer] Teams if with any questions.  Spectra [id number] Pt is a 23 year old male with a pmh HLD, ADHD, KIMMIE presents for eval of cough w hemoptysis for the past 3 weeks.    Also seen at [facility] and eventually discharged   No New joint pains/rashes.   No vaping/denies smoking.   No night sweats/weight loss.   No travel     Impression  #Hemoptysis/Bronchitis  - CTA Chest 1/11 - no lymphadenopathy - minimal COURTNEY opacities     #KIMMIE  #ADHD    Recommendations   - symptoms not suggestive of Pulmonary TB -- Low suspicion for now   - can take off isolation precautions   - monitor off antibiotics   - AFB sputum x 1 collected -- will follow -- no need to obtain further sputum   - cough suppressant   - pulm follow-up     Please call or message on [employer] Teams if with any questions.  Spectra [id number]

## 2024-01-12 NOTE — CONSULT NOTE ADULT - ASSESSMENT
Impressions:      Recommendations: HPI  22 y/o male pt with PMhx of ADHD, KIMMIE on CPAP, Dyslipidimia presents with complaints of ongoing persistent hemoptysis for 2 weeks, which worsens at night. Initially 2 weeks ago had an episode of pepsi cola colored vomitus w/ epistaxis upon sneezing, ongoing  hemoptysis and hoarseness. Was seen in ED on 12/25 for fever and dyspnea, pt was treated for bronchitis and d/c w/ 7day course of prednisone and Albuterol and was seen again on 12/31 [facility] ED for similar complaints w/ elevated WBC at 18K (most likely steroid-induced leukocytosis), elevated dimers and hemoglobin 10.7. On 1/5, pt followed up with outpatient pulmonology dx and tx KIMMIE and bronchitis w/ referral to ENT for enlarged tonsils. Yesterday, In ED pt was tachycardic with a downtrending hemoglobin from 11.1 (12/25/23) to 9.2. Currently patient in medically stable.     Impressions:  Hemoptysis  Microcytic Anemia  KIMMIE  Dyslipidemia  ADHD    Recommendations:  Trend CBC, if hemoglobin <7 consider transfusing w/ pRBC.  Possible Bronchoscopy.  Obtain and follow up coag workup.  Obtain QuantiFeron TB.  Obtain and follow up with Sputum cultures.  Obtain autoimmune panel. HPI  22 y/o male pt with PMhx of ADHD, KIMMIE on CPAP, Dyslipidimia presents with complaints of ongoing persistent hemoptysis for 2 weeks, which worsens at night. Initially 2 weeks ago had an episode of pepsi cola colored vomitus w/ epistaxis upon sneezing, ongoing  hemoptysis and hoarseness. Was seen in ED on 12/25 for fever and dyspnea, pt was treated for bronchitis and d/c w/ 7day course of prednisone and Albuterol and was seen again on 12/31 [facility] ED for similar complaints w/ elevated WBC at 18K (most likely steroid-induced leukocytosis), elevated dimers and hemoglobin 10.7. On 1/5, pt followed up with outpatient pulmonology dx and tx KIMMIE and bronchitis w/ referral to ENT for enlarged tonsils. Yesterday, In ED pt was tachycardic with a downtrending hemoglobin from 11.1 (12/25/23) to 9.2. Currently patient in medically stable.     Impressions:  Hemoptysis  Microcytic Anemia  KIMMIE  Dyslipidemia  ADHD    Recommendations:  Trend CBC, if hemoglobin <7 consider transfusing w/ pRBC.  Possible Bronchoscopy.  Obtain and follow up coag workup.  Obtain QuantiFeron TB.  Obtain and follow up with Sputum cultures.  Obtain autoimmune panel. HPI  22 y/o male pt with PMhx of ADHD, KIMMIE on CPAP, Dyslipidimia presents with complaints of ongoing persistent hemoptysis for 2 weeks, which worsens at night. Initially 2 weeks ago had an episode of pepsi cola colored vomitus w/ epistaxis upon sneezing, ongoing  hemoptysis and hoarseness. Was seen in ED on 12/25 for fever and dyspnea, pt was treated for bronchitis and d/c w/ 7day course of prednisone and Albuterol and was seen again on 12/31 [facility] ED for similar complaints w/ elevated WBC at 18K (most likely steroid-induced leukocytosis), elevated dimers and hemoglobin 10.7. On 1/5, pt followed up with outpatient pulmonology dx and tx KIMMIE and bronchitis w/ referral to ENT for enlarged tonsils. Yesterday, In ED pt was tachycardic with a downtrending hemoglobin from 11.1 (12/25/23) to 9.2. Currently patient in medically stable.     Impressions:  Hemoptysis  Microcytic Anemia  KIMMIE  Dyslipidemia  ADHD    Recommendations:  Trend CBC, if hemoglobin <7 consider transfusing w/ pRBC .  Possibly obtain Bronchoscopy.  Obtain and follow up coagulation profile, d-dimer.  Obtain QuantiFeron TB.  Obtain and follow up with Sputum cultures (AFB, fungal (aspergillosis), bacterial) and blood cultures .  Obtain autoimmune panel (ESR, CRP, SILVIA, ANCA,...) .  Follow up with ID recommendations. HPI  22 y/o male pt with PMhx of ADHD, KIMMIE on CPAP, Dyslipidimia presents with complaints of ongoing persistent hemoptysis for 2 weeks, which worsens at night. Initially 2 weeks ago had an episode of pepsi cola colored vomitus w/ epistaxis upon sneezing, ongoing  hemoptysis and hoarseness. Was seen in ED on 12/25 for fever and dyspnea, pt was treated for bronchitis and d/c w/ 7day course of prednisone and Albuterol and was seen again on 12/31 [facility] ED for similar complaints w/ elevated WBC at 18K (most likely steroid-induced leukocytosis), elevated dimers and hemoglobin 10.7. On 1/5, pt followed up with outpatient pulmonology dx and tx KIMMIE and bronchitis w/ referral to ENT for enlarged tonsils. Yesterday, In ED pt was tachycardic with a downtrending hemoglobin from 11.1 (12/25/23) to 9.2. Currently patient in medically stable.     Impressions:  Hemoptysis  Microcytic Anemia  KIMMIE  Dyslipidemia  ADHD    Recommendations:  Trend CBC, if hemoglobin <7 consider transfusing w/ pRBC .  Possibly obtain Bronchoscopy.  Obtain and follow up coagulation profile, d-dimer.  Obtain QuantiFeron TB.  Obtain and follow up with Sputum cultures (AFB, fungal (aspergillosis), bacterial) and blood cultures .  Obtain autoimmune panel (ESR, CRP, SILVIA, ANCA,...) .  Follow up with ID recommendations. HPI  24 y/o male pt with PMhx of ADHD, KIMMIE on CPAP, Dyslipidimia presents with complaints of ongoing persistent hemoptysis for 2 weeks, which worsens at night. Initially 2 weeks ago had an episode of pepsi cola colored vomitus w/ epistaxis upon sneezing, ongoing  hemoptysis and hoarseness. Was seen in ED on 12/25 for fever and dyspnea, pt was treated for bronchitis and d/c w/ 7day course of prednisone and Albuterol and was seen again on 12/31 [facility] ED for similar complaints w/ elevated WBC at 18K (most likely steroid-induced leukocytosis), elevated dimers and hemoglobin 10.7. On 1/5, pt followed up with outpatient pulmonology dx and tx KIMMIE and bronchitis w/ referral to ENT for enlarged tonsils. Yesterday, In ED pt was tachycardic with a downtrending hemoglobin from 11.1 (12/25/23) to 9.2. Currently patient in medically stable.     Impressions:  Hemoptysis  CT angio chest shows no evidence of any acute abnormalities.  Chest X-ray shows no evidence of any acute abnormalities.    Microcytic Anemia  Hemoglobin downtrended to 9.2 from 11.1 (12/25/2023)  MCV 74.6    KIMMIE  CPAP at night time at home.    Dyslipidemia  ADHD    Recommendations:  Trend CBC, if hemoglobin <7 consider transfusing w/ pRBC .  Possibly obtain Bronchoscopy.  Obtain QuantiFeron TB.  Obtain and follow up with Sputum cultures (AFB, fungal (aspergillosis), bacterial) and blood cultures .  Obtain autoimmune panel (ESR, CRP, SILVIA, ANCA,...) .  Follow up with ID recommendations.  Obtain and follow up coagulation profile, d-dimer.    DVT prophylaxis: Thromboguards. HPI  22 y/o male pt with PMhx of ADHD, KIMMIE on CPAP, Dyslipidimia presents with complaints of ongoing persistent hemoptysis for 2 weeks, which worsens at night. Initially 2 weeks ago had an episode of pepsi cola colored vomitus w/ epistaxis upon sneezing, ongoing  hemoptysis and hoarseness. Was seen in ED on 12/25 for fever and dyspnea, pt was treated for bronchitis and d/c w/ 7day course of prednisone and Albuterol and was seen again on 12/31 [facility] ED for similar complaints w/ elevated WBC at 18K (most likely steroid-induced leukocytosis), elevated dimers and hemoglobin 10.7. On 1/5, pt followed up with outpatient pulmonology dx and tx KIMMIE and bronchitis w/ referral to ENT for enlarged tonsils. Yesterday, In ED pt was tachycardic with a downtrending hemoglobin from 11.1 (12/25/23) to 9.2. Currently patient in medically stable.     Impressions:  Hemoptysis  CT angio chest shows no evidence of any acute abnormalities.  Chest X-ray shows no evidence of any acute abnormalities.    Microcytic Anemia  Hemoglobin downtrended to 9.2 from 11.1 (12/25/2023)  MCV 74.6    KIMMIE  CPAP at night time at home.    Dyslipidemia  ADHD    Recommendations:  Trend CBC, if hemoglobin <7 consider transfusing w/ pRBC .  Possibly obtain Bronchoscopy.  Obtain QuantiFeron TB.  Obtain and follow up with Sputum cultures (AFB, fungal (aspergillosis), bacterial) and blood cultures .  Obtain autoimmune panel (ESR, CRP, SILVIA, ANCA,...) .  Follow up with ID recommendations.  Obtain and follow up coagulation profile, d-dimer.    DVT prophylaxis: Thromboguards. HPI  24 y/o male pt with PMhx of ADHD, KIMMIE on CPAP, Dyslipidimia presents with complaints of ongoing persistent hemoptysis for 2 weeks, which worsens at night. Initially 2 weeks ago had an episode of pepsi cola colored vomitus w/ epistaxis upon sneezing, ongoing  hemoptysis and hoarseness. Was seen in ED on 12/25 for fever and dyspnea, pt was treated for bronchitis and d/c w/ 7day course of prednisone and Albuterol and was seen again on 12/31 [facility] ED for similar complaints w/ elevated WBC at 18K (most likely steroid-induced leukocytosis), elevated dimers and hemoglobin 10.7. On 1/5, pt followed up with outpatient pulmonology dx and tx KIMMIE and bronchitis w/ referral to ENT for enlarged tonsils. Yesterday, In ED pt was tachycardic with a downtrending hemoglobin from 11.1 (12/25/23) to 9.2. Currently patient in medically stable.     Impressions:  Hemoptysis  CT angio chest shows no evidence of any acute abnormalities.  Chest X-ray shows no evidence of any acute abnormalities.    Microcytic Anemia  Hemoglobin downtrended to 9.2 from 11.1 (12/25/2023)  MCV 74.6    KIMMIE  CPAP at night time at home.    Dyslipidemia  ADHD    Recommendations:  Trend CBC, if hemoglobin <7 consider transfusing w/ pRBC .  Possibly obtain Bronchoscopy.  Obtain Extended resp. viral panel.  Obtain autoimmune panel (ESR, CRP, SILVIA, ANCA,...) .  Follow up with ID recommendations.  Follow up coagulation profile, d-dimer.  Obtain QuantiFeron TB.  Obtain and follow up with Sputum cultures (AFB, fungal ) .      DVT prophylaxis: Thromboguards. HPI  24 y/o male pt with PMhx of ADHD, KIMMIE on CPAP, Dyslipidimia presents with complaints of ongoing persistent hemoptysis for 2 weeks, which worsens at night. Initially 2 weeks ago had an episode of pepsi cola colored vomitus w/ epistaxis upon sneezing, ongoing  hemoptysis and hoarseness. Was seen in ED on 12/25 for fever and dyspnea, pt was treated for bronchitis and d/c w/ 7day course of prednisone and Albuterol and was seen again on 12/31 [facility] ED for similar complaints w/ elevated WBC at 18K (most likely steroid-induced leukocytosis), elevated dimers and hemoglobin 10.7. On 1/5, pt followed up with outpatient pulmonology dx and tx KIMMIE and bronchitis w/ referral to ENT for enlarged tonsils. Yesterday, In ED pt was tachycardic with a downtrending hemoglobin from 11.1 (12/25/23) to 9.2. Currently patient in medically stable.     Impressions:  Hemoptysis  CT angio chest shows no evidence of any acute abnormalities.  Chest X-ray shows no evidence of any acute abnormalities.    Microcytic Anemia  Hemoglobin downtrended to 9.2 from 11.1 (12/25/2023)  MCV 74.6    KIMMIE  CPAP at night time at home.    Dyslipidemia  ADHD    Recommendations:  Trend CBC, if hemoglobin <7 consider transfusing w/ pRBC .  Possibly obtain Bronchoscopy.  Obtain Extended resp. viral panel.  Obtain autoimmune panel (ESR, CRP, SILVIA, ANCA,...) .  Follow up with ID recommendations.  Follow up coagulation profile, d-dimer.  Obtain QuantiFeron TB.  Obtain and follow up with Sputum cultures (AFB, fungal ) .      DVT prophylaxis: Thromboguards. HPI  24 y/o male pt with PMhx of ADHD, KIMMIE on CPAP, Dyslipidimia presents with complaints of ongoing persistent hemoptysis for 2 weeks, which worsens at night. Initially 2 weeks ago had an episode of pepsi cola colored vomitus w/ epistaxis upon sneezing, ongoing  hemoptysis and hoarseness. Was seen in ED on 12/25 for fever and dyspnea, pt was treated for bronchitis and d/c w/ 7day course of prednisone and Albuterol and was seen again on 12/31 [facility] ED for similar complaints w/ elevated WBC at 18K (most likely steroid-induced leukocytosis), elevated dimers and hemoglobin 10.7. On 1/5, pt followed up with outpatient pulmonology dx and tx KIMMIE and bronchitis w/ referral to ENT for enlarged tonsils. Yesterday, In ED pt was tachycardic with a downtrending hemoglobin from 11.1 (12/25/23) to 9.2. Currently patient in medically stable.     Impressions:  Hemoptysis  CT angio chest shows evidence of left upper lobe ground glass opacity no other acute abnormalities.  Chest X-ray shows no evidence of any acute abnormalities.    Microcytic Anemia  Hemoglobin downtrended to 9.2 from 11.1 (12/25/2023)  MCV 74.6    KIMMIE  CPAP at night time at home.    Dyslipidemia  ADHD    Recommendations:  Continue maintaining patient in supine position.  Trend CBC, if hemoglobin <7 consider transfusing w/ pRBC .  Obtain Bronchoscopy.  Obtain FOBT.  Obtain UDS.  Obtain Iron study.  Obtain autoimmune panel (SILVIA, Complement, ANCA) .  Follow up with ID recommendations.  Follow up with Sputum cultures (AFB, fungal ) .      DVT prophylaxis: Thromboguards. HPI  24 y/o male pt with PMhx of ADHD, KIMMIE on CPAP, Dyslipidimia presents with complaints of ongoing persistent hemoptysis for 2 weeks, which worsens at night. Initially 2 weeks ago had an episode of pepsi cola colored vomitus w/ epistaxis upon sneezing, ongoing  hemoptysis and hoarseness. Was seen in ED on 12/25 for fever and dyspnea, pt was treated for bronchitis and d/c w/ 7day course of prednisone and Albuterol and was seen again on 12/31 [facility] ED for similar complaints w/ elevated WBC at 18K (most likely steroid-induced leukocytosis), elevated dimers and hemoglobin 10.7. On 1/5, pt followed up with outpatient pulmonology dx and tx KIMMIE and bronchitis w/ referral to ENT for enlarged tonsils. Yesterday, In ED pt was tachycardic with a downtrending hemoglobin from 11.1 (12/25/23) to 9.2. Currently patient in medically stable.     Impressions:  Hemoptysis  CT angio chest shows evidence of left upper lobe ground glass opacity no other acute abnormalities.  Chest X-ray shows no evidence of any acute abnormalities.    Microcytic Anemia  Hemoglobin downtrended to 9.2 from 11.1 (12/25/2023)  MCV 74.6    KIMMIE  CPAP at night time at home.    Dyslipidemia  ADHD    Recommendations:  Continue maintaining patient in supine position.  Trend CBC, if hemoglobin <7 consider transfusing w/ pRBC .  Obtain Bronchoscopy.  Obtain FOBT.  Obtain UDS.  Obtain Iron study.  Obtain autoimmune panel (SILVIA, Complement, ANCA) .  Follow up with ID recommendations.  Follow up with Sputum cultures (AFB, fungal ) .      DVT prophylaxis: Thromboguards. HPI  22 y/o male pt with PMhx of ADHD, KIMMIE on CPAP, Dyslipidimia presents with complaints of ongoing persistent hemoptysis for 2 weeks, which worsens at night. Initially 2 weeks ago had an episode of pepsi cola colored vomitus w/ epistaxis upon sneezing, ongoing  hemoptysis and hoarseness. Was seen in ED on 12/25 for fever and dyspnea, pt was treated for bronchitis and d/c w/ 7day course of prednisone and Albuterol and was seen again on 12/31 [facility] ED for similar complaints w/ elevated WBC at 18K (most likely steroid-induced leukocytosis), elevated dimers and hemoglobin 10.7. On 1/5, pt followed up with outpatient pulmonology dx and tx KIMMIE and bronchitis w/ referral to ENT for enlarged tonsils. Yesterday, In ED pt was tachycardic with a downtrending hemoglobin from 11.1 (12/25/23) to 9.2. Currently patient in medically stable.     Impressions:  Hemoptysis  CT angio chest shows evidence of left upper lobe ground glass opacity no other acute abnormalities.  Chest X-ray shows no evidence of any acute abnormalities.    Microcytic Anemia  Hemoglobin downtrended to 9.2 from 11.1 (12/25/2023)  MCV 74.6    KIMMIE  CPAP at night time at home.    Dyslipidemia  ADHD    Recommendations:  Obtain Bronchoscopy.  Continue positioning patient in supine position.  Trend CBC, if hemoglobin <7 consider transfusing w/ pRBC .  Obtain FOBT, UDS.  Obtain Iron study.  Obtain autoimmune panel (SILVIA, Complement, ANCA) .  Follow up with ID recommendations.  Follow up with Sputum cultures (AFB, fungal ) .      DVT prophylaxis: Thromboguards. HPI  24 y/o male pt with PMhx of ADHD, KIMMIE on CPAP, Dyslipidimia presents with complaints of ongoing persistent hemoptysis for 2 weeks, which worsens at night. Initially 2 weeks ago had an episode of pepsi cola colored vomitus w/ epistaxis upon sneezing, ongoing  hemoptysis and hoarseness. Was seen in ED on 12/25 for fever and dyspnea, pt was treated for bronchitis and d/c w/ 7day course of prednisone and Albuterol and was seen again on 12/31 [facility] ED for similar complaints w/ elevated WBC at 18K (most likely steroid-induced leukocytosis), elevated dimers and hemoglobin 10.7. On 1/5, pt followed up with outpatient pulmonology dx and tx KIMMIE and bronchitis w/ referral to ENT for enlarged tonsils. Yesterday, In ED pt was tachycardic with a downtrending hemoglobin from 11.1 (12/25/23) to 9.2. Currently patient in medically stable.     Impressions:  Hemoptysis  CT angio chest shows evidence of left upper lobe ground glass opacity no other acute abnormalities.  Chest X-ray shows no evidence of any acute abnormalities.    Microcytic Anemia  Hemoglobin downtrended to 9.2 from 11.1 (12/25/2023)  MCV 74.6    KIMMIE  CPAP at night time at home.    Dyslipidemia  ADHD    Recommendations:  Obtain Bronchoscopy.  Continue positioning patient in supine position.  Trend CBC, if hemoglobin <7 consider transfusing w/ pRBC .  Obtain FOBT, UDS.  Obtain Iron study.  Obtain autoimmune panel (SILVIA, Complement, ANCA) .  Follow up with ID recommendations.  Follow up with Sputum cultures (AFB, fungal ) .      DVT prophylaxis: Thromboguards. HPI  22 y/o male pt with PMhx of ADHD, KIMMIE on CPAP, Dyslipidimia presents with complaints of ongoing persistent hemoptysis for 2 weeks, which worsens at night. Initially 2 weeks ago had an episode of pepsi cola colored vomitus w/ epistaxis upon sneezing, ongoing  hemoptysis and hoarseness. Was seen in ED on 12/25 for fever and dyspnea, pt was treated for bronchitis and d/c w/ 7day course of prednisone and Albuterol and was seen again on 12/31 [facility] ED for similar complaints w/ elevated WBC at 18K (most likely steroid-induced leukocytosis), elevated dimers and hemoglobin 10.7. On 1/5, pt followed up with outpatient pulmonology dx and tx KIMMIE and bronchitis w/ referral to ENT for enlarged tonsils. Yesterday, In ED pt was tachycardic with a downtrending hemoglobin from 11.1 (12/25/23) to 9.2. Currently patient in medically stable.     Impressions:  Hemoptysis  CT angio chest shows evidence of minimal left upper lobe ground glass opacity, no other acute abnormalities.  Chest X-ray shows no evidence of any acute abnormalities.    Microcytic Anemia  Hemoglobin downtrended to 9.2 from 11.1 (12/25/2023)  MCV 74.6    KIMMIE  CPAP at night time at home.    Dyslipidemia  ADHD    Recommendations:  Trend CBC, if hemoglobin <7 consider transfusing w/ pRBC .  Consult with ENT (for possible bleed within upper resp. tract) and follow up with recommendation .  Follow up with outpatient Pulmonology.  outpatient Bronchoscopy.  Obtain FOBT, UDS.  Obtain Iron study.  Obtain autoimmune panel (SILVIA, Complement, ANCA) .  Follow up with ID recommendations.  Follow up with Sputum cultures (AFB, fungal ) .      DVT prophylaxis: Thromboguards. HPI  24 y/o male pt with PMhx of ADHD, KIMMIE on CPAP, Dyslipidimia presents with complaints of ongoing persistent hemoptysis for 2 weeks, which worsens at night. Initially 2 weeks ago had an episode of pepsi cola colored vomitus w/ epistaxis upon sneezing, ongoing  hemoptysis and hoarseness. Was seen in ED on 12/25 for fever and dyspnea, pt was treated for bronchitis and d/c w/ 7day course of prednisone and Albuterol and was seen again on 12/31 [facility] ED for similar complaints w/ elevated WBC at 18K (most likely steroid-induced leukocytosis), elevated dimers and hemoglobin 10.7. On 1/5, pt followed up with outpatient pulmonology dx and tx KIMMIE and bronchitis w/ referral to ENT for enlarged tonsils. Yesterday, In ED pt was tachycardic with a downtrending hemoglobin from 11.1 (12/25/23) to 9.2. Currently patient in medically stable.     Impressions:  Hemoptysis  CT angio chest shows evidence of minimal left upper lobe ground glass opacity, no other acute abnormalities.  Chest X-ray shows no evidence of any acute abnormalities.    Microcytic Anemia  Hemoglobin downtrended to 9.2 from 11.1 (12/25/2023)  MCV 74.6    KIMMIE  CPAP at night time at home.    Dyslipidemia  ADHD    Recommendations:  Trend CBC, if hemoglobin <7 consider transfusing w/ pRBC .  Consult with ENT (for possible bleed within upper resp. tract) and follow up with recommendation .  Follow up with outpatient Pulmonology.  outpatient Bronchoscopy.  Obtain FOBT, UDS.  Obtain Iron study.  Obtain autoimmune panel (SILVIA, Complement, ANCA) .  Follow up with ID recommendations.  Follow up with Sputum cultures (AFB, fungal ) .      DVT prophylaxis: Thromboguards. HPI  22 y/o male pt with PMhx of ADHD, KIMMIE on CPAP, Dyslipidemia presents with complaints of ongoing persistent hemoptysis for 2 weeks, which worsens at night. Initially 2 weeks ago had an episode of pepsi cola colored vomitus w/ epistaxis upon sneezing, ongoing  hemoptysis and hoarseness. Was seen in ED on 12/25 for fever and dyspnea, pt was treated for bronchitis and d/c w/ 7day course of prednisone and Albuterol and was seen again on 12/31 [facility] ED for similar complaints w/ elevated WBC at 18K (most likely steroid-induced leukocytosis), elevated dimers and hemoglobin 10.7. On 1/5, pt followed up with outpatient pulmonology dx and tx KIMMIE and bronchitis w/ referral to ENT for enlarged tonsils. Yesterday, In ED pt was tachycardic with a downtrending hemoglobin from 11.1 (12/25/23) to 9.2. Currently patient in medically stable.     Impressions:  Hemoptysis  WBC 7.32, Hemoglobin 9.2  CT angio chest shows evidence of minimal left upper lobe ground glass opacity, no other acute abnormalities.  Chest X-ray shows no evidence of any acute abnormalities.    Microcytic Anemia  Hemoglobin downtrended to 9.2 from 11.1 (12/25/2023)  MCV 74.6    KIMMIE  Enlarged Tonsils and ongoing hoarseness.  CPAP at night time at home.    Dyslipidemia  ADHD    Recommendations:  Trend CBC, if hemoglobin <7 consider transfusing w/ pRBC .  Consult with ENT (for possible bleed within upper resp. tract) and follow up with recommendation .  Follow up with outpatient Pulmonology.  outpatient Bronchoscopy.  Obtain FOBT, UDS.  Obtain Iron study.  Obtain autoimmune panel (SILVIA, Complement, ANCA) .  Follow up with ID recommendations.  Follow up with Sputum cultures (AFB, fungal ) .      DVT prophylaxis: Thromboguards. HPI  24 y/o male pt with PMhx of ADHD, KIMMIE on CPAP, Dyslipidemia presents with complaints of ongoing persistent hemoptysis for 2 weeks, which worsens at night. Initially 2 weeks ago had an episode of pepsi cola colored vomitus w/ epistaxis upon sneezing, ongoing  hemoptysis and hoarseness. Was seen in ED on 12/25 for fever and dyspnea, pt was treated for bronchitis and d/c w/ 7day course of prednisone and Albuterol and was seen again on 12/31 [facility] ED for similar complaints w/ elevated WBC at 18K (most likely steroid-induced leukocytosis), elevated dimers and hemoglobin 10.7. On 1/5, pt followed up with outpatient pulmonology dx and tx KIMMIE and bronchitis w/ referral to ENT for enlarged tonsils. Yesterday, In ED pt was tachycardic with a downtrending hemoglobin from 11.1 (12/25/23) to 9.2. Currently patient in medically stable.     Impressions:  Hemoptysis  WBC 7.32, Hemoglobin 9.2  CT angio chest shows evidence of minimal left upper lobe ground glass opacity, no other acute abnormalities.  Chest X-ray shows no evidence of any acute abnormalities.    Microcytic Anemia  Hemoglobin downtrended to 9.2 from 11.1 (12/25/2023)  MCV 74.6    KIMMIE  Enlarged Tonsils and ongoing hoarseness.  CPAP at night time at home.    Dyslipidemia  ADHD    Recommendations:  Trend CBC, if hemoglobin <7 consider transfusing w/ pRBC .  Consult with ENT (for possible bleed within upper resp. tract) and follow up with recommendation .  Follow up with outpatient Pulmonology.  outpatient Bronchoscopy.  Obtain FOBT, UDS.  Obtain Iron study.  Obtain autoimmune panel (SILVIA, Complement, ANCA) .  Follow up with ID recommendations.  Follow up with Sputum cultures (AFB, fungal ) .      DVT prophylaxis: Thromboguards. HPI  22 y/o male pt with PMhx of ADHD, KIMMIE on CPAP, Dyslipidemia presents with complaints of ongoing persistent hemoptysis for 2 weeks, which worsens at night. Initially 2 weeks ago had an episode of pepsi cola colored vomitus w/ epistaxis upon sneezing, ongoing  hemoptysis and hoarseness. Was seen in ED on 12/25 for fever and dyspnea, pt was treated for bronchitis and d/c w/ 7day course of prednisone and Albuterol and was seen again on 12/31 [facility] ED for similar complaints w/ elevated WBC at 18K (most likely steroid-induced leukocytosis), elevated dimers and hemoglobin 10.7. On 1/5, pt followed up with outpatient pulmonology dx and tx KIMMIE and bronchitis w/ referral to ENT for enlarged tonsils. Yesterday, In ED pt was tachycardic with a downtrending hemoglobin from 11.1 (12/25/23) to 9.2. Currently patient in medically stable.     Impressions:  Hemoptysis  WBC 7.32, Hemoglobin 9.2  CT angio chest shows evidence of minimal left upper lobe ground glass opacity, no other acute abnormalities.  Chest X-ray shows no evidence of any acute abnormalities.    Microcytic Anemia  Hemoglobin downtrended to 9.2 from 11.1 (12/25/2023)  MCV 74.6    KIMMIE  Enlarged Tonsils and ongoing hoarseness.  CPAP at night time at home.    Dyslipidemia  ADHD    Recommendations:  Trend CBC, if hemoglobin <7 consider transfusing w/ pRBC .  Obtain FOBT, UDS.  Obtain Iron study.  Obtain autoimmune panel (SILVIA, Complement, ANCA) .  Consult with ENT (for possible bleed within upper resp. tract) and follow up with recommendation .  Follow up with outpatient Pulmonology (Thursday 1pm with Dr. Samaniego).  Outpatient Bronchoscopy.  Follow up with ID recommendations.  Follow up with Sputum cultures (AFB, fungal ) .      DVT prophylaxis: Thromboguards. HPI  22 y/o male pt with PMhx of ADHD, KIMMIE on CPAP, Dyslipidemia presents with complaints of ongoing persistent hemoptysis for 2 weeks, which worsens at night. Initially 2 weeks ago had an episode of pepsi cola colored vomitus w/ epistaxis upon sneezing, ongoing  hemoptysis and hoarseness. Was seen in ED on 12/25 for fever and dyspnea, pt was treated for bronchitis and d/c w/ 7day course of prednisone and Albuterol and was seen again on 12/31 [facility] ED for similar complaints w/ elevated WBC at 18K (most likely steroid-induced leukocytosis), elevated dimers and hemoglobin 10.7. On 1/5, pt followed up with outpatient pulmonology dx and tx KIMMIE and bronchitis w/ referral to ENT for enlarged tonsils. Yesterday, In ED pt was tachycardic with a downtrending hemoglobin from 11.1 (12/25/23) to 9.2. Currently patient in medically stable.     Impressions:  Hemoptysis  WBC 7.32, Hemoglobin 9.2  CT angio chest shows evidence of minimal left upper lobe ground glass opacity, no other acute abnormalities.  Chest X-ray shows no evidence of any acute abnormalities.    Microcytic Anemia  Hemoglobin downtrended to 9.2 from 11.1 (12/25/2023)  MCV 74.6    KIMMIE  Enlarged Tonsils and ongoing hoarseness.  CPAP at night time at home.    Dyslipidemia  ADHD    Recommendations:  Trend CBC, if hemoglobin <7 consider transfusing w/ pRBC .  Obtain FOBT, UDS.  Obtain Iron study.  Obtain autoimmune panel (SILVIA, Complement, ANCA) .  Consult with ENT (for possible bleed within upper resp. tract) and follow up with recommendation .  Follow up with outpatient Pulmonology (Thursday 1pm with Dr. Samaniego).  Outpatient Bronchoscopy.  Follow up with ID recommendations.  Follow up with Sputum cultures (AFB, fungal ) .      DVT prophylaxis: Thromboguards. HPI  22 y/o male pt with PMhx of ADHD, KIMMIE on CPAP, Dyslipidemia presents with complaints of ongoing persistent hemoptysis for 2 weeks, which worsens at night. Initially 2 weeks ago had an episode of pepsi cola colored vomitus w/ epistaxis upon sneezing, ongoing  hemoptysis and hoarseness. Was seen in ED on 12/25 for fever and dyspnea, pt was treated for bronchitis and d/c w/ 7day course of prednisone and Albuterol and was seen again on 12/31 [facility] ED for similar complaints w/ elevated WBC at 18K (most likely steroid-induced leukocytosis), elevated dimers and hemoglobin 10.7. On 1/5, pt followed up with outpatient pulmonology dx and tx KIMMIE and bronchitis w/ referral to ENT for enlarged tonsils. Yesterday, In ED pt was tachycardic with a downtrending hemoglobin from 11.1 (12/25/23) to 9.2. Currently patient in medically stable.     Impressions:  Hemoptysis  WBC 7.32, Hemoglobin 9.2  CT angio chest shows evidence of minimal left upper lobe ground glass opacity, no other acute abnormalities.  Chest X-ray shows no evidence of any acute abnormalities.    Microcytic Anemia  Hemoglobin downtrended to 9.2 from 11.1 (12/25/2023)  MCV 74.6    KIMMIE  Enlarged Tonsils and ongoing hoarseness.  CPAP at night time at home.    Dyslipidemia  ADHD    Recommendations:  Trend CBC, if hemoglobin <7 consider transfusing w/ pRBC .  Obtain FOBT, UDS.  Obtain Iron study.  Obtain autoimmune panel (SILVIA, Complement, ANCA) .  Consult with ENT (for possible bleed within upper resp. tract) and follow up with recommendation.  Follow up with outpatient Pulmonology (Thursday 1pm with Dr. Samaniego).  Outpatient Bronchoscopy.  Follow up with ID recommendations.  Follow up with Sputum cultures (AFB, fungal ) .      DVT prophylaxis: Thromboguards. HPI  24 y/o male pt with PMhx of ADHD, KIMMIE on CPAP, Dyslipidemia presents with complaints of ongoing persistent hemoptysis for 2 weeks, which worsens at night. Initially 2 weeks ago had an episode of pepsi cola colored vomitus w/ epistaxis upon sneezing, ongoing  hemoptysis and hoarseness. Was seen in ED on 12/25 for fever and dyspnea, pt was treated for bronchitis and d/c w/ 7day course of prednisone and Albuterol and was seen again on 12/31 [facility] ED for similar complaints w/ elevated WBC at 18K (most likely steroid-induced leukocytosis), elevated dimers and hemoglobin 10.7. On 1/5, pt followed up with outpatient pulmonology dx and tx KIMMIE and bronchitis w/ referral to ENT for enlarged tonsils. Yesterday, In ED pt was tachycardic with a downtrending hemoglobin from 11.1 (12/25/23) to 9.2. Currently patient in medically stable.     Impressions:  Hemoptysis  WBC 7.32, Hemoglobin 9.2  CT angio chest shows evidence of minimal left upper lobe ground glass opacity, no other acute abnormalities.  Chest X-ray shows no evidence of any acute abnormalities.    Microcytic Anemia  Hemoglobin downtrended to 9.2 from 11.1 (12/25/2023)  MCV 74.6    KIMMIE  Enlarged Tonsils and ongoing hoarseness.  CPAP at night time at home.    Dyslipidemia  ADHD    Recommendations:  Trend CBC, if hemoglobin <7 consider transfusing w/ pRBC .  Obtain FOBT, UDS.  Obtain Iron study.  Obtain autoimmune panel (SILVIA, Complement, ANCA) .  Consult with ENT (for possible bleed within upper resp. tract) and follow up with recommendation.  Follow up with outpatient Pulmonology (Thursday 1pm with Dr. Samaniego).  Outpatient Bronchoscopy.  Follow up with ID recommendations.  Follow up with Sputum cultures (AFB, fungal ) .      DVT prophylaxis: Thromboguards. HPI  24 y/o male pt with PMhx of ADHD, KIMMIE on CPAP, Dyslipidemia presents with complaints of ongoing persistent hemoptysis for 2 weeks, which worsens at night. Initially 2 weeks ago had an episode of pepsi cola colored vomitus w/ epistaxis upon sneezing, ongoing  hemoptysis and hoarseness. Was seen in ED on 12/25 for fever and dyspnea, pt was treated for bronchitis and d/c w/ 7day course of prednisone and Albuterol and was seen again on 12/31 [facility] ED for similar complaints w/ elevated WBC at 18K (most likely steroid-induced leukocytosis), elevated dimers and hemoglobin 10.7. On 1/5, pt followed up with outpatient pulmonology dx and tx KIMMIE and bronchitis w/ referral to ENT for enlarged tonsils. Yesterday, In ED pt was tachycardic with a downtrending hemoglobin from 11.1 (12/25/23) to 9.2. Currently patient in medically stable.     Impressions:  Hemoptysis  WBC 7.32, Hemoglobin 9.2  CT angio chest shows evidence of minimal left upper lobe ground glass opacity, no other acute abnormalities.  Chest X-ray shows no evidence of any acute abnormalities.    Microcytic Anemia  Hemoglobin downtrended to 9.2 from 11.1 (12/25/2023)  MCV 74.6    KIMMIE  Enlarged Tonsils and ongoing hoarseness.  CPAP at night time at home.    Dyslipidemia  ADHD    Recommendations:  Obtain FOBT, UDS, UA.  Obtain Iron studies.  Obtain basic autoimmunity screening (SILVIA, Complement, ANCA) .  Consult with ENT (for possible bleed within upper resp. tract)  Follow up with outpatient Pulmonology (will arrange for f/u with Dr Marcus Justice, his primary pulmonologist)  Outpatient Bronchoscopy.  Follow up with ID recommendations.  Follow up with Sputum cultures (AFB, fungal ) .  In the event of massive hemoptysis (>100cc/hr) or inability to manage secretions:   -- Call anesthesia for double-lumen ETT intubation  -- Stat call pulmonary fellow for bronchoscopic evaluation      DVT prophylaxis: Thromboguards. HPI  22 y/o male pt with PMhx of ADHD, KIMMIE on CPAP, Dyslipidemia presents with complaints of ongoing persistent hemoptysis for 2 weeks, which worsens at night. Initially 2 weeks ago had an episode of pepsi cola colored vomitus w/ epistaxis upon sneezing, ongoing  hemoptysis and hoarseness. Was seen in ED on 12/25 for fever and dyspnea, pt was treated for bronchitis and d/c w/ 7day course of prednisone and Albuterol and was seen again on 12/31 [facility] ED for similar complaints w/ elevated WBC at 18K (most likely steroid-induced leukocytosis), elevated dimers and hemoglobin 10.7. On 1/5, pt followed up with outpatient pulmonology dx and tx KIMMIE and bronchitis w/ referral to ENT for enlarged tonsils. Yesterday, In ED pt was tachycardic with a downtrending hemoglobin from 11.1 (12/25/23) to 9.2. Currently patient in medically stable.     Impressions:  Hemoptysis  WBC 7.32, Hemoglobin 9.2  CT angio chest shows evidence of minimal left upper lobe ground glass opacity, no other acute abnormalities.  Chest X-ray shows no evidence of any acute abnormalities.    Microcytic Anemia  Hemoglobin downtrended to 9.2 from 11.1 (12/25/2023)  MCV 74.6    KIMMIE  Enlarged Tonsils and ongoing hoarseness.  CPAP at night time at home.    Dyslipidemia  ADHD    Recommendations:  Obtain FOBT, UDS, UA.  Obtain Iron studies.  Obtain basic autoimmunity screening (SILVIA, Complement, ANCA) .  Consult with ENT (for possible bleed within upper resp. tract)  Follow up with outpatient Pulmonology (will arrange for f/u with Dr Marcus Justice, his primary pulmonologist)  Outpatient Bronchoscopy.  Follow up with ID recommendations.  Follow up with Sputum cultures (AFB, fungal ) .  In the event of massive hemoptysis (>100cc/hr) or inability to manage secretions:   -- Call anesthesia for double-lumen ETT intubation  -- Stat call pulmonary fellow for bronchoscopic evaluation      DVT prophylaxis: Thromboguards.

## 2024-01-12 NOTE — CHART NOTE - NSCHARTNOTEFT_GEN_A_CORE
Hospitalist Chart Note    patient evaluated this morning at bedside  - NAD, cardiopulmonary benign, soft abdomen  - patient denies episodes of hemoptysis inpatient  - ID and Pulm consulted\  - Per ID- patient does not require isolation and TB r/o (no B symptoms, cavitary lesion, or clear risk factors)- additionally advisement to discontinue antibiotics  - Pulm attending note pending however appears likely can f/u as an outpatient for Bronchoscopy. Housestaff to f/u with Pulm to clarify and if pulm is in agreement, he can be discharged today    Puma Chapman MD

## 2024-01-12 NOTE — DISCHARGE NOTE PROVIDER - CARE PROVIDERS DIRECT ADDRESSES
,[email or web address]

## 2024-01-12 NOTE — DISCHARGE NOTE NURSING/CASE MANAGEMENT/SOCIAL WORK - NSDCPEFALRISK_GEN_ALL_CORE
For information on Fall & Injury Prevention, visit: [email or web address] OR  [email or web address] OR  https://www.cdc.gov/steadi/patient.html For information on Fall & Injury Prevention, visit: [email or web address] OR  [email or web address]/[email or web address] OR  https://www.cdc.gov/steadi/patient.html

## 2024-01-12 NOTE — DISCHARGE NOTE PROVIDER - CARE PROVIDER_API CALL
Kobe Robles  Pulmonary Disease  [address], [address]  [place] [address]  Phone: [phone]  Fax: [phone]  Follow Up Time: 1 week    José Proctor  Internal Medicine  [address] [place] [address]  Phone: [phone]  Fax: [phone]  Follow Up Time: 2 weeks   Kobe Robles  Pulmonary Disease  [address], [address]  [place] [address]  Phone: [phone]  Fax: [phone]  Follow Up Time: 1 week    José Proctor  Internal Medicine  [address] [place] [address]  Phone: [phone]  Fax: [phone]  Follow Up Time: 2 weeks

## 2024-01-12 NOTE — CONSULT NOTE ADULT - SUBJECTIVE AND OBJECTIVE BOX
23y M pmh HLD  ADHD  KIMMIE presents fOR eval of cough w hemoptysis  Pt has intermittent productive blood tinged cough x2wks, worse at night, improved with tessalon pearls.  Says it 1st started around bhavna. Initially had vomiting w vomitus being pepsi colored. Also had an episode of nose bleed that day when he sneezed.  Has a hoarse voice at encounter, says has been going on since he got sick    Initially presented to ED on 12/25, had fever and dyspnea then, was discharged on prednisone 40 x 7d and  albuterol w a dx of bronchitis.    was later seen at [place] on 12/31 for same complaints   wbc 18 then (steroids), elev dimer; hemoglobin 10.7    pulm note from 1/5: Patient also snores a lot as per the mother and underwent sleep study which showed KIMMIE.  was diagnosed w bronchitis and was given an ENT referral for further eval.    Denies current fever / SOB / weight loss / malaise / recent travel ho, never left [place]    In ED,   labs: Hb 9.2 (11.1 on 12/25)  CT chest:  1. Minimal left upper lobe groundglass opacities, which may be   inflammatory or infectious in etiology.    2. No evidence of acute pulmonary embolism.      sp ceftriaxone + azithromycin in ED   (12 Jan 2024 01:39)      PAST MEDICAL & SURGICAL HISTORY:  HLD (hyperlipidemia)      No significant past surgical history      ROS:  See HPI           PHYSICAL EXAM    ICU Vital Signs Last 24 Hrs  T(C): 36.6 (11 Jan 2024 17:37), Max: 36.6 (11 Jan 2024 17:37)  T(F): 97.8 (11 Jan 2024 17:37), Max: 97.8 (11 Jan 2024 17:37)  HR: 105 (12 Jan 2024 07:35) (92 - 117)  BP: 140/68 (12 Jan 2024 07:35) (126/72 - 145/74)  BP(mean): 98 (12 Jan 2024 06:09) (98 - 98)  ABP: --  ABP(mean): --  RR: 18 (12 Jan 2024 07:35) (15 - 18)  SpO2: 99% (12 Jan 2024 07:35) (97% - 99%)    O2 Parameters below as of 12 Jan 2024 07:35  Patient On (Oxygen Delivery Method): room air            CONSTITUTIONAL:  Well nourished.  NAD, saturating well on room air    ENT:   Airway patent, No thrush, moist mucous membrane, no lesions, enlarged tonsils appreciated , not erythematous.      CARDIAC:   Tachycardic, normal rhythm, no murmurs appreciated.      RESPIRATORY:   Vesicular breath sounds appreciated throughout, no wheezes or crackles.    GASTROINTESTINAL:  Normal Bowel sounds appreciated.      NEUROLOGICAL:   Alert and oriented, no motor deficits.      LABS:                          9.1    7.32  )-----------( 475      ( 12 Jan 2024 08:43 )             28.8                                               01-11    139  |  103  |  14  ----------------------------<  118<H>  4.7   |  23  |  1.0    Ca    9.1      11 Jan 2024 19:20        PT/INR - ( 12 Jan 2024 08:43 )   PT: 11.80 sec;   INR: 1.03 ratio         PTT - ( 12 Jan 2024 08:43 )  PTT:31.3 sec                                       Urinalysis Basic - ( 11 Jan 2024 19:20 )    Color: x / Appearance: x / SG: x / pH: x  Gluc: 118 mg/dL / Ketone: x  / Bili: x / Urobili: x   Blood: x / Protein: x / Nitrite: x   Leuk Esterase: x / RBC: x / WBC x   Sq Epi: x / Non Sq Epi: x / Bacteria: x                                                                                                                                                                                CXR reviewed by myself    MEDICATIONS  (STANDING):  pantoprazole    Tablet 40 milliGRAM(s) Oral before breakfast    MEDICATIONS  (PRN):         23y M pmh HLD  ADHD  KIMMIE presents fOR eval of cough w hemoptysis  Pt has intermittent productive blood tinged cough x2wks, worse at night, improved with tessalon pearls.  Says it 1st started around bhavna. Initially had vomiting w vomitus being pepsi colored. Also had an episode of nose bleed that day when he sneezed.  Has a hoarse voice at encounter, says has been going on since he got sick    Initially presented to ED on 12/25, had fever and dyspnea then, was discharged on prednisone 40 x 7d and  albuterol w a dx of bronchitis.    was later seen at [facility] on 12/31 for same complaints   wbc 18 then (steroids), elev dimer; hemoglobin 10.7    pulm note from 1/5: Patient also snores a lot as per the mother and underwent sleep study which showed KIMMIE.  was diagnosed w bronchitis and was given an ENT referral for further eval.    Denies current fever / SOB / weight loss / malaise / recent travel ho, never left [place]    In ED,   labs: Hb 9.2 (11.1 on 12/25)  CT chest:  1. Minimal left upper lobe groundglass opacities, which may be   inflammatory or infectious in etiology.    2. No evidence of acute pulmonary embolism.      sp ceftriaxone + azithromycin in ED   (12 Jan 2024 01:39)      PAST MEDICAL & SURGICAL HISTORY:  HLD (hyperlipidemia)      No significant past surgical history      ROS:  See HPI           PHYSICAL EXAM    ICU Vital Signs Last 24 Hrs  T(C): 36.6 (11 Jan 2024 17:37), Max: 36.6 (11 Jan 2024 17:37)  T(F): 97.8 (11 Jan 2024 17:37), Max: 97.8 (11 Jan 2024 17:37)  HR: 105 (12 Jan 2024 07:35) (92 - 117)  BP: 140/68 (12 Jan 2024 07:35) (126/72 - 145/74)  BP(mean): 98 (12 Jan 2024 06:09) (98 - 98)  ABP: --  ABP(mean): --  RR: 18 (12 Jan 2024 07:35) (15 - 18)  SpO2: 99% (12 Jan 2024 07:35) (97% - 99%)    O2 Parameters below as of 12 Jan 2024 07:35  Patient On (Oxygen Delivery Method): room air            CONSTITUTIONAL:  Well nourished.  NAD, saturating well on room air    ENT:   Airway patent, No thrush, moist mucous membrane, no lesions, enlarged tonsils appreciated , not erythematous.      CARDIAC:   Tachycardic, normal rhythm, no murmurs appreciated.      RESPIRATORY:   Vesicular breath sounds appreciated throughout, no wheezes or crackles.    GASTROINTESTINAL:  Normal Bowel sounds appreciated.      NEUROLOGICAL:   Alert and oriented, no motor deficits.      LABS:                          9.1    7.32  )-----------( 475      ( 12 Jan 2024 08:43 )             28.8                                               01-11    139  |  103  |  14  ----------------------------<  118<H>  4.7   |  23  |  1.0    Ca    9.1      11 Jan 2024 19:20        PT/INR - ( 12 Jan 2024 08:43 )   PT: 11.80 sec;   INR: 1.03 ratio         PTT - ( 12 Jan 2024 08:43 )  PTT:31.3 sec                                       Urinalysis Basic - ( 11 Jan 2024 19:20 )    Color: x / Appearance: x / SG: x / pH: x  Gluc: 118 mg/dL / Ketone: x  / Bili: x / Urobili: x   Blood: x / Protein: x / Nitrite: x   Leuk Esterase: x / RBC: x / WBC x   Sq Epi: x / Non Sq Epi: x / Bacteria: x                                                                                                                                                                                CXR reviewed by myself    MEDICATIONS  (STANDING):  pantoprazole    Tablet 40 milliGRAM(s) Oral before breakfast    MEDICATIONS  (PRN):

## 2024-01-13 LAB
CRYPTOC AG FLD QL: NEGATIVE — SIGNIFICANT CHANGE UP
CRYPTOC AG FLD QL: NEGATIVE — SIGNIFICANT CHANGE UP

## 2024-01-16 LAB
AUTO DIFF PNL BLD: NEGATIVE — SIGNIFICANT CHANGE UP
C-ANCA SER-ACNC: NEGATIVE — SIGNIFICANT CHANGE UP
INTERFERON GAMMA, BLOOD: <4.2 PG/ML — SIGNIFICANT CHANGE UP
P-ANCA SER-ACNC: NEGATIVE — SIGNIFICANT CHANGE UP

## 2024-01-18 LAB — CRYPTOC AB SER-ACNC: NEGATIVE — SIGNIFICANT CHANGE UP

## 2024-01-20 LAB — H CAPSUL AG SER IA-MCNC: SIGNIFICANT CHANGE UP

## 2024-01-25 ENCOUNTER — APPOINTMENT (OUTPATIENT)
Dept: OTOLARYNGOLOGY | Facility: CLINIC | Age: 24
End: 2024-01-25

## 2024-02-06 PROBLEM — E78.5 HYPERLIPIDEMIA, UNSPECIFIED: Chronic | Status: ACTIVE | Noted: 2023-12-25

## 2024-02-06 RX ORDER — QUETIAPINE FUMARATE 200 MG/1
0 TABLET, FILM COATED ORAL
Qty: 0 | Refills: 0 | DISCHARGE

## 2024-02-28 LAB
CULTURE RESULTS: SIGNIFICANT CHANGE UP
SPECIMEN SOURCE: SIGNIFICANT CHANGE UP

## 2024-03-21 ENCOUNTER — APPOINTMENT (OUTPATIENT)
Dept: OTOLARYNGOLOGY | Facility: CLINIC | Age: 24
End: 2024-03-21
Payer: MEDICAID

## 2024-03-21 VITALS — HEIGHT: 68 IN | WEIGHT: 233 LBS | BODY MASS INDEX: 35.31 KG/M2

## 2024-03-21 DIAGNOSIS — J35.3 HYPERTROPHY OF TONSILS WITH HYPERTROPHY OF ADENOIDS: ICD-10-CM

## 2024-03-21 PROBLEM — F90.9 ATTENTION-DEFICIT HYPERACTIVITY DISORDER, UNSPECIFIED TYPE: Chronic | Status: ACTIVE | Noted: 2019-03-12

## 2024-03-21 PROBLEM — F81.9 DEVELOPMENTAL DISORDER OF SCHOLASTIC SKILLS, UNSPECIFIED: Chronic | Status: ACTIVE | Noted: 2019-03-12

## 2024-03-21 PROCEDURE — 31575 DIAGNOSTIC LARYNGOSCOPY: CPT

## 2024-03-21 NOTE — ASSESSMENT
[FreeTextEntry1] : Rajeev is a pleasant 24 yo male with recent PSG revealing KIMMIE with adenotonsillar hypertrophy.  Flexible fiberoptic laryngoscopy done today confirmed adenotonsillar hypertrophy.  Recommend tonsillectomy and adenoidectomy for KIMMIE.  Risks, benefits and alternatives discussed at length and all questions answered.  Risks include but not limited to: life threatening post tonsillectomy hemorrhage, infection, need for further surgery, damage to lips, teeth and gums.  Benefits include: improved breathing at night with reduced need for CPAP.  Alternatives include CPAP medical therapy.  Rajeev would like to proceed with the surgery and informed consent signed.

## 2024-03-21 NOTE — HISTORY OF PRESENT ILLNESS
[de-identified] : Patient presents today c/o swollen tonsils, KIMMIE. States that he is experiencing swollen tonsils. Also increased snoring and waking himself up at night due to being out of breath. Denies any throat pain right now. Had sleep study performed. Told he has KIMMIE, needs CPAP but not using it at this time. Went to ED 12/4/23, 12/25/23 for enlarged tonsils. Also went to ED 1/11/24 - tested positive for Tuberculosis.

## 2024-03-21 NOTE — REASON FOR VISIT
[Initial Evaluation] : an initial evaluation for [FreeTextEntry2] : enlarged swollen tonsils, KIMMIE

## 2024-03-21 NOTE — PROCEDURE
[de-identified] : Permission granted for flexible laryngoscopy.  Indication: KIMMIE The nares were pre-treated with 1 spray each of lidocaine 1% and oxymetazoline.  The flexible laryngoscope was introduced into the left nare to examine the nasal cavity, nasopharynx, oropharynx, supraglottis and glottis.   Findings:  Nasal cavity: septum midline, OMC clear, turbinates normal size Choana: adenoid 50% of choana Oropharynx: tonsils 3+ Vallecula: clear Pyriform sinuses: clear  AE folds: mobile TVF: mobile bilaterally subglottic triangle: Patent Patient tolerated procedure well without complications.

## 2024-03-21 NOTE — PHYSICAL EXAM
[Laryngoscopy Performed] : laryngoscopy was performed, see procedure section for findings [Normal] : palpation of lymph nodes is normal [FreeTextEntry1] : Tired appearing, phonating with hyponasal voice [de-identified] : EAC clear bilaterally [de-identified] : Soft and flat with large circumference.  [de-identified] : 3+  [de-identified] : TM intact, no erythema, no ANANYA bilaterally.

## 2024-04-04 ENCOUNTER — TRANSCRIPTION ENCOUNTER (OUTPATIENT)
Age: 24
End: 2024-04-04

## 2024-04-08 ENCOUNTER — OUTPATIENT (OUTPATIENT)
Dept: OUTPATIENT SERVICES | Facility: HOSPITAL | Age: 24
LOS: 1 days | End: 2024-04-08
Payer: MEDICAID

## 2024-04-08 DIAGNOSIS — R07.9 CHEST PAIN, UNSPECIFIED: ICD-10-CM

## 2024-04-08 PROCEDURE — 71046 X-RAY EXAM CHEST 2 VIEWS: CPT

## 2024-04-08 PROCEDURE — 71046 X-RAY EXAM CHEST 2 VIEWS: CPT | Mod: 26

## 2024-04-09 ENCOUNTER — INPATIENT (INPATIENT)
Facility: HOSPITAL | Age: 24
LOS: 3 days | Discharge: HOME | DRG: 663 | End: 2024-04-13
Attending: STUDENT IN AN ORGANIZED HEALTH CARE EDUCATION/TRAINING PROGRAM | Admitting: STUDENT IN AN ORGANIZED HEALTH CARE EDUCATION/TRAINING PROGRAM
Payer: MEDICAID

## 2024-04-09 VITALS
WEIGHT: 227.96 LBS | HEART RATE: 115 BPM | RESPIRATION RATE: 18 BRPM | SYSTOLIC BLOOD PRESSURE: 122 MMHG | DIASTOLIC BLOOD PRESSURE: 63 MMHG | TEMPERATURE: 99 F | OXYGEN SATURATION: 98 % | HEIGHT: 68 IN

## 2024-04-09 DIAGNOSIS — D64.9 ANEMIA, UNSPECIFIED: ICD-10-CM

## 2024-04-09 DIAGNOSIS — R07.9 CHEST PAIN, UNSPECIFIED: ICD-10-CM

## 2024-04-09 LAB
ALBUMIN SERPL ELPH-MCNC: 4 G/DL — SIGNIFICANT CHANGE UP (ref 3.5–5.2)
ALP SERPL-CCNC: 61 U/L — SIGNIFICANT CHANGE UP (ref 30–115)
ALT FLD-CCNC: 8 U/L — SIGNIFICANT CHANGE UP (ref 0–41)
ANION GAP SERPL CALC-SCNC: 9 MMOL/L — SIGNIFICANT CHANGE UP (ref 7–14)
APTT BLD: 27.3 SEC — SIGNIFICANT CHANGE UP (ref 27–39.2)
AST SERPL-CCNC: 10 U/L — SIGNIFICANT CHANGE UP (ref 0–41)
BASOPHILS # BLD AUTO: 0.09 K/UL — SIGNIFICANT CHANGE UP (ref 0–0.2)
BASOPHILS NFR BLD AUTO: 1 % — SIGNIFICANT CHANGE UP (ref 0–1)
BILIRUB SERPL-MCNC: 0.4 MG/DL — SIGNIFICANT CHANGE UP (ref 0.2–1.2)
BUN SERPL-MCNC: 12 MG/DL — SIGNIFICANT CHANGE UP (ref 10–20)
CALCIUM SERPL-MCNC: 9.2 MG/DL — SIGNIFICANT CHANGE UP (ref 8.4–10.5)
CHLORIDE SERPL-SCNC: 105 MMOL/L — SIGNIFICANT CHANGE UP (ref 98–110)
CO2 SERPL-SCNC: 25 MMOL/L — SIGNIFICANT CHANGE UP (ref 17–32)
CREAT SERPL-MCNC: 0.9 MG/DL — SIGNIFICANT CHANGE UP (ref 0.7–1.5)
EGFR: 124 ML/MIN/1.73M2 — SIGNIFICANT CHANGE UP
EOSINOPHIL # BLD AUTO: 0.28 K/UL — SIGNIFICANT CHANGE UP (ref 0–0.7)
EOSINOPHIL NFR BLD AUTO: 3 % — SIGNIFICANT CHANGE UP (ref 0–8)
GLUCOSE SERPL-MCNC: 94 MG/DL — SIGNIFICANT CHANGE UP (ref 70–99)
HCT VFR BLD CALC: 18.8 % — LOW (ref 42–52)
HGB BLD-MCNC: 4.8 G/DL — CRITICAL LOW (ref 14–18)
INR BLD: 1.19 RATIO — SIGNIFICANT CHANGE UP (ref 0.65–1.3)
IRON SATN MFR SERPL: 15 UG/DL — LOW (ref 35–150)
IRON SATN MFR SERPL: 4 % — LOW (ref 15–50)
LDH SERPL L TO P-CCNC: 158 U/L — SIGNIFICANT CHANGE UP (ref 50–242)
LYMPHOCYTES # BLD AUTO: 1.84 K/UL — SIGNIFICANT CHANGE UP (ref 1.2–3.4)
LYMPHOCYTES # BLD AUTO: 20 % — LOW (ref 20.5–51.1)
MCHC RBC-ENTMCNC: 13.2 PG — LOW (ref 27–31)
MCHC RBC-ENTMCNC: 25.5 G/DL — LOW (ref 32–37)
MCV RBC AUTO: 51.8 FL — LOW (ref 80–94)
MONOCYTES # BLD AUTO: 0.64 K/UL — HIGH (ref 0.1–0.6)
MONOCYTES NFR BLD AUTO: 7 % — SIGNIFICANT CHANGE UP (ref 1.7–9.3)
NEUTROPHILS # BLD AUTO: 6.06 K/UL — SIGNIFICANT CHANGE UP (ref 1.4–6.5)
NEUTROPHILS NFR BLD AUTO: 66 % — SIGNIFICANT CHANGE UP (ref 42.2–75.2)
NRBC # BLD: SIGNIFICANT CHANGE UP /100 WBCS (ref 0–0)
PLATELET # BLD AUTO: 638 K/UL — HIGH (ref 130–400)
PMV BLD: 8.9 FL — SIGNIFICANT CHANGE UP (ref 7.4–10.4)
POTASSIUM SERPL-MCNC: 4.7 MMOL/L — SIGNIFICANT CHANGE UP (ref 3.5–5)
POTASSIUM SERPL-SCNC: 4.7 MMOL/L — SIGNIFICANT CHANGE UP (ref 3.5–5)
PROT SERPL-MCNC: 6.4 G/DL — SIGNIFICANT CHANGE UP (ref 6–8)
PROTHROM AB SERPL-ACNC: 13.6 SEC — HIGH (ref 9.95–12.87)
RBC # BLD: 3.59 M/UL — LOW (ref 4.7–6.1)
RBC # BLD: 3.63 M/UL — LOW (ref 4.7–6.1)
RBC # FLD: 25.1 % — HIGH (ref 11.5–14.5)
RETICS #: 80.4 K/UL — SIGNIFICANT CHANGE UP (ref 25–125)
RETICS/RBC NFR: 2.2 % — HIGH (ref 0.5–1.5)
SODIUM SERPL-SCNC: 139 MMOL/L — SIGNIFICANT CHANGE UP (ref 135–146)
TIBC SERPL-MCNC: 381 UG/DL — SIGNIFICANT CHANGE UP (ref 220–430)
TROPONIN T, HIGH SENSITIVITY RESULT: 10 NG/L — SIGNIFICANT CHANGE UP (ref 6–21)
UIBC SERPL-MCNC: 366 UG/DL — SIGNIFICANT CHANGE UP (ref 110–370)
WBC # BLD: 9.18 K/UL — SIGNIFICANT CHANGE UP (ref 4.8–10.8)
WBC # FLD AUTO: 9.18 K/UL — SIGNIFICANT CHANGE UP (ref 4.8–10.8)

## 2024-04-09 PROCEDURE — 80048 BASIC METABOLIC PNL TOTAL CA: CPT

## 2024-04-09 PROCEDURE — 83615 LACTATE (LD) (LDH) ENZYME: CPT

## 2024-04-09 PROCEDURE — 82607 VITAMIN B-12: CPT

## 2024-04-09 PROCEDURE — 80061 LIPID PANEL: CPT

## 2024-04-09 PROCEDURE — 85027 COMPLETE CBC AUTOMATED: CPT

## 2024-04-09 PROCEDURE — 36415 COLL VENOUS BLD VENIPUNCTURE: CPT

## 2024-04-09 PROCEDURE — 81003 URINALYSIS AUTO W/O SCOPE: CPT

## 2024-04-09 PROCEDURE — 85045 AUTOMATED RETICULOCYTE COUNT: CPT

## 2024-04-09 PROCEDURE — 94660 CPAP INITIATION&MGMT: CPT

## 2024-04-09 PROCEDURE — 83010 ASSAY OF HAPTOGLOBIN QUANT: CPT

## 2024-04-09 PROCEDURE — P9040: CPT

## 2024-04-09 PROCEDURE — 83735 ASSAY OF MAGNESIUM: CPT

## 2024-04-09 PROCEDURE — 88312 SPECIAL STAINS GROUP 1: CPT

## 2024-04-09 PROCEDURE — 83921 ORGANIC ACID SINGLE QUANT: CPT

## 2024-04-09 PROCEDURE — 83036 HEMOGLOBIN GLYCOSYLATED A1C: CPT

## 2024-04-09 PROCEDURE — 83090 ASSAY OF HOMOCYSTEINE: CPT

## 2024-04-09 PROCEDURE — P9016: CPT

## 2024-04-09 PROCEDURE — 36430 TRANSFUSION BLD/BLD COMPNT: CPT

## 2024-04-09 PROCEDURE — 92610 EVALUATE SWALLOWING FUNCTION: CPT | Mod: GN

## 2024-04-09 PROCEDURE — 83020 HEMOGLOBIN ELECTROPHORESIS: CPT

## 2024-04-09 PROCEDURE — 88305 TISSUE EXAM BY PATHOLOGIST: CPT

## 2024-04-09 PROCEDURE — 82784 ASSAY IGA/IGD/IGG/IGM EACH: CPT

## 2024-04-09 PROCEDURE — 99223 1ST HOSP IP/OBS HIGH 75: CPT

## 2024-04-09 PROCEDURE — 86364 TISS TRNSGLTMNASE EA IG CLAS: CPT

## 2024-04-09 PROCEDURE — 85025 COMPLETE CBC W/AUTO DIFF WBC: CPT

## 2024-04-09 PROCEDURE — 80053 COMPREHEN METABOLIC PANEL: CPT

## 2024-04-09 PROCEDURE — 93010 ELECTROCARDIOGRAM REPORT: CPT

## 2024-04-09 PROCEDURE — 99285 EMERGENCY DEPT VISIT HI MDM: CPT

## 2024-04-09 PROCEDURE — C9113: CPT

## 2024-04-09 PROCEDURE — 82746 ASSAY OF FOLIC ACID SERUM: CPT

## 2024-04-09 RX ORDER — QUETIAPINE FUMARATE 200 MG/1
1 TABLET, FILM COATED ORAL
Qty: 0 | Refills: 0 | DISCHARGE

## 2024-04-09 RX ORDER — ACETAMINOPHEN 500 MG
650 TABLET ORAL EVERY 6 HOURS
Refills: 0 | Status: DISCONTINUED | OUTPATIENT
Start: 2024-04-09 | End: 2024-04-13

## 2024-04-09 RX ORDER — ONDANSETRON 8 MG/1
4 TABLET, FILM COATED ORAL EVERY 8 HOURS
Refills: 0 | Status: DISCONTINUED | OUTPATIENT
Start: 2024-04-09 | End: 2024-04-13

## 2024-04-09 RX ORDER — DEXMETHYLPHENIDATE HYDROCHLORIDE 35 MG/1
1 CAPSULE, EXTENDED RELEASE ORAL
Qty: 0 | Refills: 0 | DISCHARGE

## 2024-04-09 RX ORDER — PANTOPRAZOLE SODIUM 20 MG/1
40 TABLET, DELAYED RELEASE ORAL EVERY 12 HOURS
Refills: 0 | Status: DISCONTINUED | OUTPATIENT
Start: 2024-04-09 | End: 2024-04-11

## 2024-04-09 RX ORDER — LANOLIN ALCOHOL/MO/W.PET/CERES
3 CREAM (GRAM) TOPICAL AT BEDTIME
Refills: 0 | Status: DISCONTINUED | OUTPATIENT
Start: 2024-04-09 | End: 2024-04-13

## 2024-04-09 RX ADMIN — PANTOPRAZOLE SODIUM 40 MILLIGRAM(S): 20 TABLET, DELAYED RELEASE ORAL at 22:18

## 2024-04-09 NOTE — H&P ADULT - ASSESSMENT
23M w/ PMHx h/o Anemia (baseline hgb of 9, never had transfusions), Sickle Cell Trait (not disease), Learning Disability, ADHD, HLD and KIMMIE presents the ED for anemia and sent in by his pulmonary doctor for a hemoglobin of 5.0. States that on Saturday he had 1 episode of dark stool associated with 1x episode of NBNB emesis, dyspnea on exertion and lightheadedness, but currently has no nausea, vomiting, diarrhea, abdominal pain, hematochezia, melena or hematuria. Denies any fever, chills, sore throat, chest pain, cough or dysuria. Denies ever having EGD/colonoscopy.    Vitals: Temp 99.1F, /63, , RR 18, SpO2 98% on RA    Labs: Hgb 4.8 (previously 9.1), MCV 51.8, Platelet 636, Retic % 2.2, Total Iron 15, %sat 4, Cr 0.9 (previously 1.2),      EKG - Sinus Tach    In the ED:  - s/p 2u pRBCs    Admitted to medicine for workup and management of symptomatic anemia.   23M w/ PMHx h/o Anemia (baseline hgb of 9, never had transfusions), Sickle Cell Trait (not disease), Learning Disability, ADHD, HLD and KIMMIE presents the ED for anemia. Admitted to medicine for symptomatic anemia requiring transfusions.    #Symptomatic Iron Deficiency Anemia, Acute on Chronic  #Questionable Melena x1 episode  #recent h/o Hemoptysis  #Sickle Cell Trait  - Vitals: Temp 99.1F, /63, , RR 18, SpO2 98% on RA  - Hgb 4.8 (previously 9.1), MCV 51.8, Platelet 636  - Retic % 2.2  - Total Iron 15, %sat 4  - EKG - Sinus Tach  - s/p 2u pRBCs    #HLD  -     #KIMMIE  -     #Learning Disability  #ADHD  -    #DVT ppx:  #GI ppx:  #Diet: Regular - DASH/TLC  #Activity: IAT  #Code Status: Full Code  #Dispo: from home, acute  23M w/ PMHx h/o Anemia (baseline hgb of 9, never had transfusions), Sickle Cell Trait (not disease), Learning Disability, ADHD, HLD and KIMMIE presents the ED for anemia. Admitted to medicine for symptomatic anemia requiring transfusions.    #Symptomatic Iron Deficiency Anemia, Acute on Chronic  #recent h/o Hemoptysis - attributed to Bronchitis  #Sickle Cell Trait  #Thrombocytosis  #Intermittent Dysphagia w/ Globus Sensation and Regurg  - Vitals: Temp 99.1F, /63, , RR 18, SpO2 98% on RA  - Hgb 4.8 (previously 9.1), MCV 51.8, Platelet 636  - Retic % 2.2,  wnl  - Total Iron 15, %sat 4  - EKG - Sinus Tach  - s/p 2u pRBCs  - start PPI IV BID  - clear liquid diet for now - advance as per GI  - monitor H&H q12hrs  - transfuse PRN, keep Hgb >7  - maintain active T&S  - f/u Retic, LDH and Haptoglobin  - f/u Transglutaminase Ab IgG/IgA  - f/u GI consult  - f/u speech and swallow eval  - may need Heme/Onc eval    #HLD  - f/u lipid panel, a1c    #Depression  - patient takes Fluoxetine at home - unsure of dosage - records show Fluoxetine 20mg BID, patient and mother states 10mg qD - verify in AM    #DVT ppx: SCDs  #GI ppx: PPI IV BID  #Diet: Clear Liquid Diet for now - advance as per GI  #Activity: IAT  #Code Status: Full Code  #Dispo: from home, acute  23M w/ PMHx h/o Anemia (baseline hgb of 9, never had transfusions), Sickle Cell Trait (not disease), Learning Disability, ADHD, HLD and KIMMIE presents the ED for anemia. Admitted to medicine for symptomatic anemia requiring transfusions.    #Symptomatic Iron Deficiency Anemia, Acute on Chronic  #recent h/o Hemoptysis - attributed to Bronchitis  #Sickle Cell Trait  #Thrombocytosis  #Intermittent Dysphagia w/ Globus Sensation and Regurg  - Vitals: Temp 99.1F, /63, , RR 18, SpO2 98% on RA  - Hgb 4.8 (previously 9.1), MCV 51.8, Platelet 636  - Retic % 2.2,  wnl  - Total Iron 15, %sat 4  - EKG - Sinus Tach  - s/p 2u pRBCs  - start PPI IV BID  - clear liquid diet for now - advance as per GI  - monitor H&H q12hrs  - transfuse PRN, keep Hgb >7  - maintain active T&S  - f/u b12, folate, lead, zinc and copper  - f/u Retic, LDH and Haptoglobin  - f/u Transglutaminase Ab IgG/IgA  - f/u GI consult  - f/u speech and swallow eval  - may need Heme/Onc eval    #HLD  - f/u lipid panel, a1c    #Depression  - patient takes Fluoxetine at home - unsure of dosage - records show Fluoxetine 20mg BID, patient and mother states 10mg qD - verify in AM    #DVT ppx: SCDs  #GI ppx: PPI IV BID  #Diet: Clear Liquid Diet for now - advance as per GI  #Activity: IAT  #Code Status: Full Code  #Dispo: from home, acute  23M w/ PMHx h/o Anemia (baseline hgb of 9, never had transfusions), Sickle Cell Trait (not disease), Learning Disability, ADHD, HLD and KIMMIE presents the ED for anemia. Admitted to medicine for symptomatic anemia requiring transfusions.    #Symptomatic Iron Deficiency Anemia, Acute on Chronic  #recent h/o Hemoptysis - attributed to Bronchitis  #Sickle Cell Trait  #Thrombocytosis  #Intermittent Dysphagia w/ Globus Sensation and Regurg  - Vitals: Temp 99.1F, /63, , RR 18, SpO2 98% on RA  - Hgb 4.8 (previously 9.1), MCV 51.8, Platelet 636  - Retic % 2.2,  wnl  - Total Iron 15, %sat 4  - EKG - Sinus Tach  - s/p 2u pRBCs  - start PPI IV BID  - clear liquid diet for now - advance as per GI  - monitor H&H q12hrs  - transfuse PRN, keep Hgb >7  - maintain active T&S  - f/u b12, folate  - f/u Retic, LDH and Haptoglobin  - f/u Transglutaminase Ab IgG/IgA  - f/u GI consult  - f/u speech and swallow eval  - may need Heme/Onc eval    #HLD  - f/u lipid panel, a1c    #Depression  - patient takes Fluoxetine at home - unsure of dosage - records show Fluoxetine 20mg BID, patient and mother states 10mg qD - verify in AM    #DVT ppx: SCDs  #GI ppx: PPI IV BID  #Diet: Clear Liquid Diet for now - advance as per GI  #Activity: IAT  #Code Status: Full Code  #Dispo: from home, acute

## 2024-04-09 NOTE — ED ADULT NURSE NOTE - NSFALLUNIVINTERV_ED_ALL_ED
Bed/Stretcher in lowest position, wheels locked, appropriate side rails in place/Call bell, personal items and telephone in reach/Instruct patient to call for assistance before getting out of bed/chair/stretcher/Non-slip footwear applied when patient is off stretcher/[place] to call system/Physically safe environment - no spills, clutter or unnecessary equipment/Purposeful proactive rounding/Room/bathroom lighting operational, light cord in reach

## 2024-04-09 NOTE — ED PROVIDER NOTE - CLINICAL SUMMARY MEDICAL DECISION MAKING FREE TEXT BOX
Pt here with anemia found on outpatient labs. Has exertional sob which has preceded lab values. Unclear etiology for anemia as hemoptysis is small/infrequent and pt denies GIB sx. Labs notable for hgb 4.8. Ekg nonischemic. Pt consented for blood transfusion and inpatient team to f/u rest of anemia labs. Requires admission for further eval/tx of anemia given significant drop in hgb and 1st time transfusion, at risk for hemorrhagic shock, end organ damage, death, etc if not closely monitored and tx'ed.

## 2024-04-09 NOTE — H&P ADULT - HISTORY OF PRESENT ILLNESS
23M w/ PMHx h/o Anemia (baseline hgb of 9, never had transfusions), Sickle Cell Trait (not disease), Learning Disability, ADHD, HLD and KIMMIE presents the ED for anemia and sent in by his pulmonary doctor for a hemoglobin of 5.0. States that on Saturday he had 1 episode of dark stool associated with 1x episode of NBNB emesis, dyspnea on exertion and lightheadedness, but currently has no nausea, vomiting, diarrhea, abdominal pain, hematochezia, melena or hematuria. Denies any fever, chills, sore throat, chest pain, cough or dysuria. Denies ever having EGD/colonoscopy.    Vitals: Temp 99.1F, /63, , RR 18, SpO2 98% on RA    Labs: Hgb 4.8 (previously 9.1), MCV 51.8, Platelet 636, Retic % 2.2, Total Iron 15, %sat 4, Cr 0.9 (previously 1.2),      EKG - Sinus Tach    In the ED:  - s/p 2u pRBCs    Admitted to medicine for workup and management of symptomatic anemia. 23M w/ PMHx h/o Anemia (baseline hgb of 9, never had transfusions), Sickle Cell Trait (not disease), Learning Disability, ADHD, HLD and KIMMIE presents the ED for anemia and sent in by his pulmonary doctor for a hemoglobin of 5.0. States that on Saturday he had 1 episode of dark stool associated with 1x episode of NBNB emesis, dyspnea on exertion and lightheadedness, but currently has no nausea, vomiting, diarrhea, abdominal pain, hematochezia, melena or hematuria. Denies any fever, chills, sore throat, chest pain, cough or dysuria. Denies ever having EGD/colonoscopy. Of note, patient was recently admitted in Jan 2024 for hemoptysis. Patient was seen by infectious disease, pulm and ENT. Hemoptysis was attributed to possible viral bronchitis. Sputum cultures were negative for AFB - r/o-ed TB and CTA was negative for PE.    Vitals: Temp 99.1F, /63, , RR 18, SpO2 98% on RA    Labs: Hgb 4.8 (previously 9.1), MCV 51.8, Platelet 636, Retic % 2.2, Total Iron 15, %sat 4, Cr 0.9 (previously 1.2),      EKG - Sinus Tach    In the ED:  - s/p 2u pRBCs    Admitted to medicine for workup and management of symptomatic anemia. 23M w/ PMHx h/o Anemia (baseline hgb of 9, never had transfusions), Sickle Cell Trait (not disease), Learning Disability, ADHD, HLD and KIMMIE presents the ED for anemia and sent in by his pulmonary doctor for a hemoglobin of 5.0. Admits to having dyspnea on exertion, lightheadedness and intermittent dysphagia w/ globus sensation resulting in vomitus of food. Denies fevers, chills, chest pain, diarrhea abdominal pain, hematochezia, melena or hematuria. Never had EGD/colonoscopy. Denies recreational drug use. Of note, patient was recently admitted in Jan 2024 for hemoptysis. Patient was seen by infectious disease, pulm and ENT. Hemoptysis was attributed to possible viral bronchitis. Sputum cultures were negative for AFB - r/o-ed TB and CTA was negative for PE.    Vitals: Temp 99.1F, /63, , RR 18, SpO2 98% on RA    Labs: Hgb 4.8 (previously 9.1), MCV 51.8, Platelet 636, Retic % 2.2, Total Iron 15, %sat 4, Cr 0.9 (previously 1.2),      EKG - Sinus Tach    In the ED:  - s/p 2u pRBCs    Admitted to medicine for workup and management of symptomatic anemia.

## 2024-04-09 NOTE — ED PROVIDER NOTE - NSICDXPASTMEDICALHX_GEN_ALL_CORE_FT
PAST MEDICAL HISTORY:  ADHD     Anemia     HLD (hyperlipidemia)     Learning disability     Sickle cell trait

## 2024-04-09 NOTE — H&P ADULT - NSHPPHYSICALEXAM_GEN_ALL_CORE
VITALS:   Vital Signs Last 24 Hrs  T(C): 37.2 (09 Apr 2024 19:45), Max: 37.3 (09 Apr 2024 15:49)  T(F): 99 (09 Apr 2024 19:45), Max: 99.1 (09 Apr 2024 15:49)  HR: 112 (09 Apr 2024 19:45) (112 - 115)  BP: 120/59 (09 Apr 2024 19:45) (120/59 - 122/63)  RR: 18 (09 Apr 2024 19:45) (18 - 18)  SpO2: 98% (09 Apr 2024 19:45) (98% - 98%)      Parameters below as of 09 Apr 2024 19:45  Patient On (Oxygen Delivery Method): room air      PHYSICAL EXAM:  General: WN/WD NAD  HEENT: PERRLA, EOMI, moist mucous membranes  Neurology: A&Ox3, nonfocal, PRAJAPATI x 4  Respiratory: CTA B/L, normal respiratory effort, no wheezes, crackles, rales  CV: RRR, S1S2, no murmurs, rubs or gallops  Abdominal: Soft, NT, ND +BS, Last BM  Extremities: No edema, + peripheral pulses VITALS:   Vital Signs Last 24 Hrs  T(C): 37.2 (09 Apr 2024 19:45), Max: 37.3 (09 Apr 2024 15:49)  T(F): 99 (09 Apr 2024 19:45), Max: 99.1 (09 Apr 2024 15:49)  HR: 112 (09 Apr 2024 19:45) (112 - 115)  BP: 120/59 (09 Apr 2024 19:45) (120/59 - 122/63)  RR: 18 (09 Apr 2024 19:45) (18 - 18)  SpO2: 98% (09 Apr 2024 19:45) (98% - 98%)      Parameters below as of 09 Apr 2024 19:45  Patient On (Oxygen Delivery Method): room air      PHYSICAL EXAM:  General: overnight, in NAD  HEENT: NCAT, PERRLA, EOMI, moist mucous membranes, no scleral icterus  Neurology: A&Ox3, nonfocal, PRAJAPATI x 4  Respiratory: CTA B/L, normal respiratory effort, no wheezes, crackles, rales  CV: tachycardic, regular rhythm, S1S2, no murmurs, rubs or gallops  Abdominal: Soft, NT, ND, +BS, no guarding or rebound  Extremities: No LE edema, +2 peripheral pulses  Skin: no signs of jaundice

## 2024-04-09 NOTE — H&P ADULT - ATTENDING COMMENTS
HPI:  23M w/ PMHx h/o Anemia (baseline hgb of 9, never had transfusions), Sickle Cell Trait (not disease), Learning Disability, ADHD, HLD and KIMMIE presents the ED for anemia and sent in by his pulmonary doctor for a hemoglobin of 5.0. Admits to having dyspnea on exertion, lightheadedness and intermittent dysphagia w/ globus sensation resulting in vomitus of food. Denies fevers, chills, chest pain, diarrhea abdominal pain, hematochezia, melena or hematuria. Never had EGD/colonoscopy. Denies recreational drug use. Of note, patient was recently admitted in Jan 2024 for hemoptysis. Patient was seen by infectious disease, pulm and ENT. Hemoptysis was attributed to possible viral bronchitis. Sputum cultures were negative for AFB - r/o-ed TB and CTA was negative for PE.    Vitals: Temp 99.1F, /63, , RR 18, SpO2 98% on RA    Labs: Hgb 4.8 (previously 9.1), MCV 51.8, Platelet 636, Retic % 2.2, Total Iron 15, %sat 4, Cr 0.9 (previously 1.2),      EKG - Sinus Tach    In the ED:  - s/p 2u pRBCs    Admitted to medicine for workup and management of symptomatic anemia. (09 Apr 2024 20:33)    REVIEW OF SYSTEMS: see cc/HPI   CONSTITUTIONAL: No weakness, fevers or chills  EYES/ENT: No visual changes;  No vertigo or throat pain   NECK: No pain or stiffness  RESPIRATORY: No cough, wheezing, hemoptysis; (+) dyspnea / shortness of breath  CARDIOVASCULAR: No chest pain or palpitations  GASTROINTESTINAL: No abdominal or epigastric pain. No nausea, vomiting, or hematemesis; No diarrhea or constipation. No melena or hematochezia.  GENITOURINARY: No dysuria, frequency or hematuria  NEUROLOGICAL: No numbness or weakness  SKIN: No itching, rashes  ENDO: No hyperglycemia, No thyroid disorder, No dyslipidemia   HEM: No bleeding, No easy bruising, (+) iron def anemia   PSYCHE: No psychosis, No mood disorder No hallucinations No delusion   MSK: No deformity, No fracture, No Joint swelling    Physical Exam:  General: WN/WD NAD  Neurology: A&Ox3, nonfocal, follows commands  Eyes: PERRLA/ EOMI  ENT/Neck: Neck supple, trachea midline, No JVD  Respiratory: CTA B/L, No wheezing, rales, rhonchi  CV: Normal rate regular rhythm, S1S2, no murmurs, rubs or gallops  Abdominal: Soft, NT, ND +BS,   Extremities: No edema, + peripheral pulses  Skin: No Rashes, Hematoma, Ecchymosis    A/p  Acute on chronic severe anemia r/o acute blood loss in addition to BETTY s/p PRBC in the ED  Iron def anemia   Intermittent globus sensation / dysphagia r/o neoplasia r/o GERD   -NPO for EGD/Colonoscopy  in AM   -GI eval   -speech and swallow   -IV PPI   -serial CBC  -FOBT / iron studies / follow hemolysis w/u   -IV iron   -If no GI pathology noted will get Hem/Onc    Dyslipidemia   -statin     Depression   -dose verification for fluoxetine     DVT prophylaxis - SCDs to bilateral LEs while in bed HPI:  23M w/ PMHx h/o Anemia (baseline hgb of 9, never had transfusions), Sickle Cell Trait (not disease), Learning Disability, ADHD, HLD and KIMMIE presents the ED for anemia and sent in by his pulmonary doctor for a hemoglobin of 5.0. Admits to having dyspnea on exertion, lightheadedness and intermittent dysphagia w/ globus sensation resulting in vomitus of food. Denies fevers, chills, chest pain, diarrhea abdominal pain, hematochezia, melena or hematuria. Never had EGD/colonoscopy. Denies recreational drug use. Of note, patient was recently admitted in Jan 2024 for hemoptysis. Patient was seen by infectious disease, pulm and ENT. Hemoptysis was attributed to possible viral bronchitis. Sputum cultures were negative for AFB - r/o-ed TB and CTA was negative for PE.    Vitals: Temp 99.1F, /63, , RR 18, SpO2 98% on RA    Labs: Hgb 4.8 (previously 9.1), MCV 51.8, Platelet 636, Retic % 2.2, Total Iron 15, %sat 4, Cr 0.9 (previously 1.2),      EKG - Sinus Tach    In the ED:  - s/p 2u pRBCs    Admitted to medicine for workup and management of symptomatic anemia. (09 Apr 2024 20:33)    REVIEW OF SYSTEMS: see cc/HPI   CONSTITUTIONAL: No weakness, fevers or chills  EYES/ENT: No visual changes;  No vertigo or throat pain   NECK: No pain or stiffness  RESPIRATORY: No cough, wheezing, hemoptysis; (+) dyspnea / shortness of breath  CARDIOVASCULAR: No chest pain or palpitations  GASTROINTESTINAL: No abdominal or epigastric pain. No nausea, vomiting, or hematemesis; No diarrhea or constipation. No melena or hematochezia.  GENITOURINARY: No dysuria, frequency or hematuria  NEUROLOGICAL: No numbness or weakness  SKIN: No itching, rashes  ENDO: No hyperglycemia, No thyroid disorder, No dyslipidemia   HEM: No bleeding, No easy bruising, (+) iron def anemia   PSYCHE: No psychosis, No mood disorder No hallucinations No delusion   MSK: No deformity, No fracture, No Joint swelling    Physical Exam:  General: WN/WD NAD  Neurology: A&Ox3, nonfocal, follows commands  Eyes: PERRLA/ EOMI  ENT/Neck: Neck supple, trachea midline, No JVD  Respiratory: CTA B/L, No wheezing, rales, rhonchi  CV: Normal rate regular rhythm, S1S2, no murmurs, rubs or gallops  Abdominal: Soft, NT, ND +BS,   Extremities: No edema, + peripheral pulses  Skin: No Rashes, Hematoma, Ecchymosis    A/p  Acute on chronic severe anemia r/o acute blood loss in addition to BETTY s/p PRBC in the ED ( hemodynamically stable)  Iron def anemia   Intermittent globus sensation / dysphagia r/o neoplasia r/o GERD   -NPO for EGD/Colonoscopy  in AM   -GI eval   -speech and swallow   -IV PPI   -serial CBC  -FOBT / iron studies / follow hemolysis w/u   -IV iron   -If no GI pathology noted will get Hem/Onc    Dyslipidemia   -statin     Depression   -dose verification for fluoxetine     DVT prophylaxis - SCDs to bilateral LEs while in bed

## 2024-04-09 NOTE — H&P ADULT - NSHPLABSRESULTS_GEN_ALL_CORE
LABS:  cret                        4.8    9.18  )-----------( 638      ( 09 Apr 2024 16:50 )             18.8     04-09    139  |  105  |  12  ----------------------------<  94  4.7   |  25  |  0.9    Ca    9.2      09 Apr 2024 16:50    TPro  6.4  /  Alb  4.0  /  TBili  0.4  /  DBili  x   /  AST  10  /  ALT  8   /  AlkPhos  61  04-09    PT/INR - ( 09 Apr 2024 16:50 )   PT: 13.60 sec;   INR: 1.19 ratio         PTT - ( 09 Apr 2024 16:50 )  PTT:27.3 sec

## 2024-04-09 NOTE — ED PROVIDER NOTE - ATTENDING APP SHARED VISIT CONTRIBUTION OF CARE
22 yo M with hx of ADHD, HLD, learning disability, sickle cell trait who presents with abnormal labs values. Pt has been having exertional sob x6 mo and was admitted 4 mo ago for hemoptysis. Was found to have anemia ~9 and had workup done then. Had routine f/u with pulm 3 days ago and had blood work done at that time. Was called today for hgb 5 so sent to ED for transfusion. Pt says exertional sob has been unchanged since prior to his admission. Still has intermittent hemoptysis but not daily and not large amounts. No fever, cp, blood in urine/stool. Never been transfused before. No hx of EGD/colonoscopy.    PMD @ clinic  Pulm Dr. Anmol Guillory    CONSTITUTIONAL: well developed, nontoxic appearing, in no acute distress, speaking in full sentences, pale  SKIN: warm, dry, no rash, cap refill < 2 seconds  HEENT: normocephalic, atraumatic, no conjunctival erythema, moist mucous membranes, patent airway  NECK: supple  CV:  regular rate, regular rhythm, 2+ radial pulses bilaterally  RESP: no wheezes, no rales, no rhonchi, normal work of breathing  ABD: soft, no tenderness, nondistended, no rebound, no guarding  MSK: normal ROM, no cyanosis, no edema  NEURO: alert, oriented, grossly unremarkable  PSYCH: cooperative, appropriate    MDM:  Pt here with anemia found on outpatient labs. Has exertional sob which has preceded lab values. Unclear etiology for anemia as hemoptysis is small/infrequent and pt denies GIB sx. Will obtain labs, ekg r/o anemia, end organ damage, sickle cell crisis (less likely as pt has sickle cell trait, not disease per mom). Likely transfuse and admit.

## 2024-04-09 NOTE — ED PROVIDER NOTE - OBJECTIVE STATEMENT
23-year-old male with a history of KIMMIE, ADHD, anemia, sickle cell trait presents the ED with abnormal lab result.  Patient was following up with his pulmonary doctor and had labs on Saturday which revealed a hemoglobin of 5.0 for which she was sent in.  He states that on Saturday he had 1 episode of dark stool and 1 episode of vomiting but currently has no nausea, vomiting, diarrhea, abdominal pain, hematochezia, melena.  He admits to dyspnea on exertion and lightheadedness when he stands up too fast.  Denies fever, chills, sore throat, chest pain, cough, dysuria, hematuria.

## 2024-04-09 NOTE — ED PROVIDER NOTE - PHYSICAL EXAMINATION
Physical Exam    Constitutional: No acute distress.   Eyes: Conjunctiva pink, Sclera clear, PERRLA, EOMI.  ENT: No sinus tenderness. No nasal discharge. No oropharyngeal erythema, edema, or exudates. Uvula midline.   Cardiovascular: Regular rate, regular rhythm. No noted murmurs rubs or gallops.  Respiratory: unlabored respiratory effort, clear to auscultation bilaterally no wheezing, rales or rhonchi  Gastrointestinal: Normal bowel sounds. soft, non distended, non-tender abdomen.   Rectum: Performed with RN Eaton - brown stool   Musculoskeletal: supple neck, no midline tenderness. No joint or bony deformity.   Integumentary: warm, dry, no rash  Neurologic: awake, alert, cranial nerves II-XII grossly intact, extremities’ motor and sensory functions grossly intact  Psychiatric: appropriate mood, appropriate affect

## 2024-04-10 LAB
A1C WITH ESTIMATED AVERAGE GLUCOSE RESULT: 5.1 % — SIGNIFICANT CHANGE UP (ref 4–5.6)
ALBUMIN SERPL ELPH-MCNC: 4.1 G/DL — SIGNIFICANT CHANGE UP (ref 3.5–5.2)
ALP SERPL-CCNC: 69 U/L — SIGNIFICANT CHANGE UP (ref 30–115)
ALT FLD-CCNC: 8 U/L — SIGNIFICANT CHANGE UP (ref 0–41)
ANION GAP SERPL CALC-SCNC: 11 MMOL/L — SIGNIFICANT CHANGE UP (ref 7–14)
AST SERPL-CCNC: 11 U/L — SIGNIFICANT CHANGE UP (ref 0–41)
BILIRUB SERPL-MCNC: 0.6 MG/DL — SIGNIFICANT CHANGE UP (ref 0.2–1.2)
BUN SERPL-MCNC: 11 MG/DL — SIGNIFICANT CHANGE UP (ref 10–20)
CALCIUM SERPL-MCNC: 9.4 MG/DL — SIGNIFICANT CHANGE UP (ref 8.4–10.5)
CHLORIDE SERPL-SCNC: 105 MMOL/L — SIGNIFICANT CHANGE UP (ref 98–110)
CHOLEST SERPL-MCNC: 105 MG/DL — SIGNIFICANT CHANGE UP
CO2 SERPL-SCNC: 24 MMOL/L — SIGNIFICANT CHANGE UP (ref 17–32)
CREAT SERPL-MCNC: 1 MG/DL — SIGNIFICANT CHANGE UP (ref 0.7–1.5)
EGFR: 109 ML/MIN/1.73M2 — SIGNIFICANT CHANGE UP
ESTIMATED AVERAGE GLUCOSE: 100 MG/DL — SIGNIFICANT CHANGE UP (ref 68–114)
FERRITIN SERPL-MCNC: 3 NG/ML — LOW (ref 30–400)
FOLATE SERPL-MCNC: 2.8 NG/ML — LOW
GLUCOSE SERPL-MCNC: 92 MG/DL — SIGNIFICANT CHANGE UP (ref 70–99)
HAPTOGLOB SERPL-MCNC: 239 MG/DL — HIGH (ref 34–200)
HCT VFR BLD CALC: 25.4 % — LOW (ref 42–52)
HCT VFR BLD CALC: 26.5 % — LOW (ref 42–52)
HDLC SERPL-MCNC: 39 MG/DL — LOW
HGB BLD-MCNC: 7.1 G/DL — LOW (ref 14–18)
HGB BLD-MCNC: 7.2 G/DL — LOW (ref 14–18)
LDH SERPL L TO P-CCNC: 170 U/L — SIGNIFICANT CHANGE UP (ref 50–242)
LIPID PNL WITH DIRECT LDL SERPL: 53 MG/DL — SIGNIFICANT CHANGE UP
MAGNESIUM SERPL-MCNC: 2.3 MG/DL — SIGNIFICANT CHANGE UP (ref 1.8–2.4)
MCHC RBC-ENTMCNC: 16.1 PG — LOW (ref 27–31)
MCHC RBC-ENTMCNC: 16.6 PG — LOW (ref 27–31)
MCHC RBC-ENTMCNC: 27.2 G/DL — LOW (ref 32–37)
MCHC RBC-ENTMCNC: 28 G/DL — LOW (ref 32–37)
MCV RBC AUTO: 59.2 FL — LOW (ref 80–94)
MCV RBC AUTO: 59.3 FL — LOW (ref 80–94)
NON HDL CHOLESTEROL: 66 MG/DL — SIGNIFICANT CHANGE UP
NRBC # BLD: 2 /100 WBCS — HIGH (ref 0–0)
NRBC # BLD: 3 /100 WBCS — HIGH (ref 0–0)
PLATELET # BLD AUTO: 597 K/UL — HIGH (ref 130–400)
PLATELET # BLD AUTO: 601 K/UL — HIGH (ref 130–400)
PMV BLD: 9.1 FL — SIGNIFICANT CHANGE UP (ref 7.4–10.4)
PMV BLD: 9.3 FL — SIGNIFICANT CHANGE UP (ref 7.4–10.4)
POTASSIUM SERPL-MCNC: 5.1 MMOL/L — HIGH (ref 3.5–5)
POTASSIUM SERPL-SCNC: 5.1 MMOL/L — HIGH (ref 3.5–5)
PROT SERPL-MCNC: 6.5 G/DL — SIGNIFICANT CHANGE UP (ref 6–8)
RBC # BLD: 4.29 M/UL — LOW (ref 4.7–6.1)
RBC # BLD: 4.47 M/UL — LOW (ref 4.7–6.1)
RBC # BLD: 4.47 M/UL — LOW (ref 4.7–6.1)
RBC # FLD: 33.3 % — HIGH (ref 11.5–14.5)
RBC # FLD: 33.5 % — HIGH (ref 11.5–14.5)
RETICS #: 41.2 K/UL — SIGNIFICANT CHANGE UP (ref 25–125)
RETICS/RBC NFR: 0.9 % — SIGNIFICANT CHANGE UP (ref 0.5–1.5)
SODIUM SERPL-SCNC: 140 MMOL/L — SIGNIFICANT CHANGE UP (ref 135–146)
TRIGL SERPL-MCNC: 67 MG/DL — SIGNIFICANT CHANGE UP
VIT B12 SERPL-MCNC: 277 PG/ML — SIGNIFICANT CHANGE UP (ref 232–1245)
WBC # BLD: 10.22 K/UL — SIGNIFICANT CHANGE UP (ref 4.8–10.8)
WBC # BLD: 7.88 K/UL — SIGNIFICANT CHANGE UP (ref 4.8–10.8)
WBC # FLD AUTO: 10.22 K/UL — SIGNIFICANT CHANGE UP (ref 4.8–10.8)
WBC # FLD AUTO: 7.88 K/UL — SIGNIFICANT CHANGE UP (ref 4.8–10.8)

## 2024-04-10 PROCEDURE — 99232 SBSQ HOSP IP/OBS MODERATE 35: CPT

## 2024-04-10 PROCEDURE — 99223 1ST HOSP IP/OBS HIGH 75: CPT

## 2024-04-10 RX ORDER — IRON SUCROSE 20 MG/ML
100 INJECTION, SOLUTION INTRAVENOUS EVERY 24 HOURS
Refills: 0 | Status: DISCONTINUED | OUTPATIENT
Start: 2024-04-10 | End: 2024-04-12

## 2024-04-10 RX ORDER — FERROUS SULFATE 325(65) MG
325 TABLET ORAL DAILY
Refills: 0 | Status: DISCONTINUED | OUTPATIENT
Start: 2024-04-10 | End: 2024-04-10

## 2024-04-10 RX ORDER — FLUOXETINE HCL 10 MG
20 CAPSULE ORAL DAILY
Refills: 0 | Status: DISCONTINUED | OUTPATIENT
Start: 2024-04-10 | End: 2024-04-13

## 2024-04-10 RX ADMIN — Medication 20 MILLIGRAM(S): at 11:53

## 2024-04-10 RX ADMIN — PANTOPRAZOLE SODIUM 40 MILLIGRAM(S): 20 TABLET, DELAYED RELEASE ORAL at 17:55

## 2024-04-10 RX ADMIN — IRON SUCROSE 210 MILLIGRAM(S): 20 INJECTION, SOLUTION INTRAVENOUS at 17:55

## 2024-04-10 RX ADMIN — Medication 325 MILLIGRAM(S): at 11:34

## 2024-04-10 RX ADMIN — PANTOPRAZOLE SODIUM 40 MILLIGRAM(S): 20 TABLET, DELAYED RELEASE ORAL at 06:08

## 2024-04-10 NOTE — CONSULT NOTE ADULT - ASSESSMENT
Assessment and Plan  Case of a 23 year old male patient        - Follow up with our [facility] located at [address] [place] [address]. Phone Number: [phone]    - Follow up with our [facility] located at [address] [place] [address], [address]. Telephone No: [phone]    Thank you for your consult.  - Please note that plan was discussed with Dr. WELCH and communicated with medical team.   - Please reach GI on [phone] during weekdays till 5pm.  - Please call the GI service line after 5pm on Weekdays and anytime on Weekends: [phone].      Emmanuel Hamm MD  PGY - 4 Gastroenterology Fellow   [facility]     Assessment and Plan  Case of a 23 year old male patient known to have a history of ADHD, sickle cell trait, DL, and KIMMIE who was referred to the ED on 04/09 by his pulmonologist for an outpatient Hb of 5, found to be stable with a Hb of 4.8 s/p 2 units of pRBC. We are consulted for concern of acute on chronic anemia and dysphagia.      Acute on Chronic Severely Microcytic Anemia in Setting of History of Sickle Cell Trait   No Evidence of Overt Gastrointestinal Bleeding  Suspected Esophageal Dysphagia with Globus Sensation  * History of Sickle cell trait; he did not recall name of hematologist; never required transfusions  * Baseline Hb 9; never required transfusions  * Referred by pulmonologist for outpatient Hb 5  * GI History: notes dysphagia to solids not liquids for few days; associated with globus; denies abdominal pain, nausea, vomiting, weight loss, NSAID use, reflux symptoms; denies constipation, melena, hematochezia, hematemesis; baseline BMs: QOD with no straining  * No sepsis on admission  * ED Labs Hb 4.8, MCV 51.8 on 04/09 s/p 2 units pRBC -> Hb 7.1 on 04/10  * Iron with Total Binding Capacity (04.09.24 @ 16:50)  Iron - Total Binding Capacity.: 381 ug/dL; % Saturation, Iron: 4 %;Iron Total: 15 ug/dL; Unsaturated Iron Binding Capacity: 366 ug/dL  * No prior EGD or Colonoscopy per patient; FHx stage 4 GB cancer in father per patient  * No prior abdominal imaging  * Family History: negative for GI malignancies    RECOMMENDATIONS  - Will schedule patient for an EGD on 04/11 (in setting of anemia and dysphagia). Will need pulmonary risk stratification in setting of KIMMIE  - Please check preop labs tonight: CBC, BMP, Mg, and INR  - Speech and swallow evaluation appreciated: cleared for regular diet with thins. Please keep NPO after midnight  - Trend Hb and keep active T/S. Transfuse as needed to keep Hb >7. Recommend hematology follow up in setting of history of sickle cell trait  - Can continue IV protonix 40mg BID for now and maalox PRN  - Advance diet as tolerated if Hb stable and there are no signs of active bleeding  - Monitor BM for signs of bleeding (for melena or hematochezia)  - Avoid NSAIDs      Thank you for your consult.  - Please note that plan was communicated with medical team.   - Please reach GI on [phone] during weekdays till 5pm.  - Please call the GI service line after 5pm on Weekdays and anytime on Weekends: [phone].      Emamnuel Hamm MD  PGY - 4 Gastroenterology Fellow   [facility]

## 2024-04-10 NOTE — PATIENT PROFILE ADULT - STATED REASON FOR ADMISSION
low hgb, CANO, lightheadedness, and intermittent dysphagia with globus sensation resulting in vomitus of food

## 2024-04-10 NOTE — SWALLOW BEDSIDE ASSESSMENT ADULT - COMMENTS
#Symptomatic Iron Deficiency Anemia, Acute on Chronic  #recent h/o Hemoptysis - attributed to Bronchitis  #Sickle Cell Trait  #Thrombocytosis  #Intermittent Dysphagia w/ Globus Sensation and Regurgitation. Reported intermittent Dysphagia w/ Globus Sensation and Regurgitation.

## 2024-04-10 NOTE — PATIENT PROFILE ADULT - FALL HARM RISK - RISK INTERVENTIONS
Assistance OOB with selected safe patient handling equipment/Assistance with ambulation/Communicate Fall Risk and Risk Factors to all staff, patient, and family/Reinforce activity limits and safety measures with patient and family/Review medications for side effects contributing to fall risk/Sit up slowly, dangle for a short time, stand at bedside before walking/Toileting schedule using arm’s reach rule for commode and bathroom/Visual Cue: Yellow wristband/Bed in lowest position, wheels locked, appropriate side rails in place/Call bell, personal items and telephone in reach/Instruct patient to call for assistance before getting out of bed or chair/Non-slip footwear when patient is out of bed/[place] to call system/Physically safe environment - no spills, clutter or unnecessary equipment/Purposeful Proactive Rounding/Room/bathroom lighting operational, light cord in reach

## 2024-04-10 NOTE — CONSULT NOTE ADULT - SUBJECTIVE AND OBJECTIVE BOX
Gastroenterology Initial Consult Note      Location: [facility] ED Hold 058 A ([facility] ED Hold)  Patient Name: EDDY IRENE  Age: 23y  Gender: Male      Chief Complaint  Patient is a 23y old Male who presents with a chief complaint of Symptomatic Anemia (10 Apr 2024 14:23)  Primary diagnosis of Anemia      Reason for Consult  Anemia  Dysphagia      History of Present Illness  23 year old male patient known to have a history of ADHD, sickle cell trait, DL, and KIMMIE who was referred to the ED on 04/09 by his pulmonologist for an outpatient Hb of 5, found to be stable with a Hb of 4.8 s/p 2 units of pRBC. We are consulted for concern of acute on chronic anemia and dysphagia.      Summary:  * History of Sickle cell trait; he did not recall name of hematologist; never required transfusions  * Baseline Hb 9; never required transfusions  * Referred by pulmonologist for outpatient Hb 5  * GI History: notes dysphagia to solids not liquids for few days; associated with globus; denies abdominal pain, nausea, vomiting, weight loss, NSAID use, reflux symptoms; denies constipation, melena, hematochezia, hematemesis; baseline BMs: QOD with no straining  * No sepsis on admission  * ED Labs Hb 4.8, MCV 51.8 on 04/09 s/p 2 units pRBC -> Hb 7.1 on 04/10  * Iron with Total Binding Capacity (04.09.24 @ 16:50)  Iron - Total Binding Capacity.: 381 ug/dL; % Saturation, Iron: 4 %;Iron Total: 15 ug/dL; Unsaturated Iron Binding Capacity: 366 ug/dL  * No prior EGD or Colonoscopy per patient; FHx stage 4 GB cancer in father per patient  * No prior abdominal imaging  * Family History: negative for GI malignancies        Prior EGD:  no prior      Prior Colonoscopy:  no prior      Past Medical and Surgical History:  ADHD    Learning disability    HLD (hyperlipidemia)    Sickle cell trait    Anemia    No significant past surgical history        Home Medications:  Home Medications:  FLUoxetine 20 mg oral tablet: 1 tab(s) orally once a day (10 Apr 2024 10:51)      Social History:  Tobacco: denies  Alcohol: denies  Drugs: denies      Allergies:  No Known Allergies      Family History:  FAMILY HISTORY:  Stage IV GB cancer      Vital Signs in the last 24 hours   Vitals Summary T(C): 36.7 (04-10-24 @ 07:41), Max: 37.3 (04-09-24 @ 15:49)  HR: 97 (04-10-24 @ 07:41) (97 - 115)  BP: 102/65 (04-10-24 @ 07:41) (102/55 - 136/63)  RR: 18 (04-10-24 @ 07:41) (18 - 19)  SpO2: 100% (04-10-24 @ 07:41) (98% - 100%)  Vent Data   Intake/ Output   Measurements Height (cm): 172.7 (04-09-24 @ 15:49)  Weight (kg): 103.4 (04-09-24 @ 15:49)  BMI (kg/m2): 34.7 (04-09-24 @ 15:49)  BSA (m2): 2.16 (04-09-24 @ 15:49)      Physical Exam  * General Appearance: Alert, cooperative, interactive, oriented to time, place, and person, in no acute distress  * Eyes: PERRL, conjunctiva/corneas clear, EOM's intact, fundi benign, both eyes   * Lungs: Good bilateral air entry, normal breath sound  * Heart: Regular Rate and Rhythm, normal S1 and S2, no audible murmur, rub, or gallop  * Abdomen: Symmetric, non-distended, soft, non-tender, bowel sounds active all four quadrants  * Rectal: empty vault, Normal tone, no palpable masses or tenderness      Investigations   Laboratory Workup      - CBC:                        7.2    7.88  )-----------( 601      ( 10 Apr 2024 09:04 )             26.5       - Hgb Trend:  7.2  04-10-24 @ 09:04  7.1  04-10-24 @ 00:15  4.8  04-09-24 @ 16:50        - Chemistry:  04-10    140  |  105  |  11  ----------------------------<  92  5.1<H>   |  24  |  1.0    Ca    9.4      10 Apr 2024 09:04  Mg     2.3     04-10    TPro  6.5  /  Alb  4.1  /  TBili  0.6  /  DBili  x   /  AST  11  /  ALT  8   /  AlkPhos  69  04-10    Liver panel trend:  TBili 0.6   /   AST 11   /   ALT 8   /   AlkP 69   /   Tptn 6.5   /   Alb 4.1    /   DBili --      04-10  TBili 0.4   /   AST 10   /   ALT 8   /   AlkP 61   /   Tptn 6.4   /   Alb 4.0    /   DBili --      04-09      - Coagulation Studies:  PT/INR - ( 09 Apr 2024 16:50 )   PT: 13.60 sec;   INR: 1.19 ratio    PTT - ( 09 Apr 2024 16:50 )  PTT:27.3 sec      Microbiological Workup  Urinalysis Basic - ( 10 Apr 2024 09:04 )    Color: x / Appearance: x / SG: x / pH: x  Gluc: 92 mg/dL / Ketone: x  / Bili: x / Urobili: x   Blood: x / Protein: x / Nitrite: x   Leuk Esterase: x / RBC: x / WBC x   Sq Epi: x / Non Sq Epi: x / Bacteria: x        Current Medications  Standing Medications  FLUoxetine 20 milliGRAM(s) Oral daily  iron sucrose IVPB 100 milliGRAM(s) IV Intermittent every 24 hours  pantoprazole  Injectable 40 milliGRAM(s) IV Push every 12 hours    PRN Medications  acetaminophen     Tablet .. 650 milliGRAM(s) Oral every 6 hours PRN Temp greater or equal to 38C (100.4F), Mild Pain (1 - 3)  aluminum hydroxide/magnesium hydroxide/simethicone Suspension 30 milliLiter(s) Oral every 4 hours PRN Dyspepsia  melatonin 3 milliGRAM(s) Oral at bedtime PRN Insomnia  ondansetron Injectable 4 milliGRAM(s) IV Push every 8 hours PRN Nausea and/or Vomiting    Singles Doses Administered  (ADM OVERRIDE) 1 each &lt;see task&gt; GiveOnce  (ADM OVERRIDE) 1 each &lt;see task&gt; GiveOnce

## 2024-04-10 NOTE — SWALLOW BEDSIDE ASSESSMENT ADULT - SWALLOW EVAL: DIAGNOSIS
Oropharyngeal swallow is WFL for thin liquids and regular solids. Suspected esophageal dysphagia 2/2 previous concerns of globus sensation and regurgitation.

## 2024-04-10 NOTE — SWALLOW BEDSIDE ASSESSMENT ADULT - SLP PERTINENT HISTORY OF CURRENT PROBLEM
22yo male w PMHx of Anemia (baseline hgb of 9, never had transfusions), Sickle Cell Trait (not disease), Learning Disability, ADHD, HLD and KIMMIE. Presents the ED for anemia. Admitted to medicine for symptomatic anemia requiring transfusions.

## 2024-04-10 NOTE — CHART NOTE - NSCHARTNOTEFT_GEN_A_CORE
Called patient's pharmacy ([facility]) and confirmed that patient is taking Fluoxetine 20 mg once daily. Prescription was last filled on 3/14/24.

## 2024-04-10 NOTE — PROGRESS NOTE ADULT - SUBJECTIVE AND OBJECTIVE BOX
EDDY IRENE  23y  Male      Patient is a 23y old  Male who presents with a chief complaint of Symptomatic Anemia (09 Apr 2024 20:33)      INTERVAL HPI/OVERNIGHT EVENTS: no acute events overnight. no major complaints. sitting up watching on phone. no chest pain, sob, dizziness.       REVIEW OF SYSTEMS:  CONSTITUTIONAL: No fever, weight loss, or fatigue  RESPIRATORY: No cough, wheezing, chills or hemoptysis; No shortness of breath  CARDIOVASCULAR: No chest pain, palpitations, dizziness, or leg swelling  GASTROINTESTINAL: No abdominal or epigastric pain. No nausea, vomiting, or hematemesis; No diarrhea or constipation. No melena or hematochezia.  GENITOURINARY: No dysuria, frequency, hematuria, or incontinence  NEUROLOGICAL: No headaches, memory loss, loss of strength, numbness, or tremors  SKIN: No itching, burning, rashes, or lesions   MUSCULOSKELETAL: No joint pain or swelling; No muscle, back, or extremity pain  PSYCHIATRIC: No depression, anxiety, mood swings, or difficulty sleeping  All other review of systems negative    T(C): 36.7 (04-10-24 @ 07:41), Max: 37.3 (04-09-24 @ 15:49)  HR: 97 (04-10-24 @ 07:41) (97 - 115)  BP: 102/65 (04-10-24 @ 07:41) (102/55 - 136/63)  RR: 18 (04-10-24 @ 07:41) (18 - 19)  SpO2: 100% (04-10-24 @ 07:41) (98% - 100%)  Wt(kg): --Vital Signs Last 24 Hrs  T(C): 36.7 (10 Apr 2024 07:41), Max: 37.3 (09 Apr 2024 15:49)  T(F): 98.1 (10 Apr 2024 07:41), Max: 99.1 (09 Apr 2024 15:49)  HR: 97 (10 Apr 2024 07:41) (97 - 115)  BP: 102/65 (10 Apr 2024 07:41) (102/55 - 136/63)  BP(mean): 71 (10 Apr 2024 06:12) (71 - 71)  RR: 18 (10 Apr 2024 07:41) (18 - 19)  SpO2: 100% (10 Apr 2024 07:41) (98% - 100%)    Parameters below as of 10 Apr 2024 07:41  Patient On (Oxygen Delivery Method): room air            PHYSICAL EXAM:  GENERAL: NAD, well-groomed, well-developed  PSYCH: no agitation, slow mentation and flat affect  NERVOUS SYSTEM:  Alert & Oriented X3   PULMONARY: symmetrical chest rise  EXTREMITIES:  No clubbing, cyanosis, or edema  SKIN: No rashes or lesions    Consultant(s) Notes Reviewed:  [x ] YES  [ ] NO    Discussed with Consultants/Other Providers [ x] YES     LABS                          7.2    7.88  )-----------( 601      ( 10 Apr 2024 09:04 )             26.5     04-10    140  |  105  |  11  ----------------------------<  92  5.1<H>   |  24  |  1.0    Ca    9.4      10 Apr 2024 09:04  Mg     2.3     04-10    TPro  6.5  /  Alb  4.1  /  TBili  0.6  /  DBili  x   /  AST  11  /  ALT  8   /  AlkPhos  69  04-10      Urinalysis Basic - ( 10 Apr 2024 09:04 )    Color: x / Appearance: x / SG: x / pH: x  Gluc: 92 mg/dL / Ketone: x  / Bili: x / Urobili: x   Blood: x / Protein: x / Nitrite: x   Leuk Esterase: x / RBC: x / WBC x   Sq Epi: x / Non Sq Epi: x / Bacteria: x      PT/INR - ( 09 Apr 2024 16:50 )   PT: 13.60 sec;   INR: 1.19 ratio    PTT - ( 09 Apr 2024 16:50 )  PTT:27.3 sec      RADIOLOGY & ADDITIONAL TESTS:  - no images 4/10  Imaging Personally Reviewed:  [ ] YES  [ ] NO    HEALTH ISSUES - PROBLEM Dx:    MEDICATIONS  (STANDING):  ferrous    sulfate 325 milliGRAM(s) Oral daily  FLUoxetine 20 milliGRAM(s) Oral daily  pantoprazole  Injectable 40 milliGRAM(s) IV Push every 12 hours    MEDICATIONS  (PRN):  acetaminophen     Tablet .. 650 milliGRAM(s) Oral every 6 hours PRN Temp greater or equal to 38C (100.4F), Mild Pain (1 - 3)  aluminum hydroxide/magnesium hydroxide/simethicone Suspension 30 milliLiter(s) Oral every 4 hours PRN Dyspepsia  melatonin 3 milliGRAM(s) Oral at bedtime PRN Insomnia  ondansetron Injectable 4 milliGRAM(s) IV Push every 8 hours PRN Nausea and/or Vomiting

## 2024-04-10 NOTE — PATIENT PROFILE ADULT - NSPROPTRIGHTREPPHONE_GEN_A_NUR
Emma Goodman is a 22year old female [facility] Patient's last menstrual period was 10/16/2018 (exact date). Patient presents with:  Wellness Visit  . Patient is not sexually active, would like to continue with OCP    OBSTETRICS HISTORY:  OB History   Gra palpitations  Respiratory:  denies shortness of breath  Gastrointestinal:  denies heartburn, abdominal pain, diarrhea or constipation  Genitourinary:  denies dysuria, incontinence, abnormal vaginal discharge, vaginal itching  Musculoskeletal:  denies back [phone]

## 2024-04-10 NOTE — PATIENT PROFILE ADULT - FALL HARM RISK - HARM RISK INTERVENTIONS
Assistance with ambulation/Assistance OOB with selected safe patient handling equipment/Communicate Risk of Fall with Harm to all staff/Reinforce activity limits and safety measures with patient and family/Review medications for side effects contributing to fall risk/Sit up slowly, dangle for a short time, stand at bedside before walking/Tailored Fall Risk Interventions/Toileting schedule using arm’s reach rule for commode and bathroom/Visual Cue: Yellow wristband and red socks/Bed in lowest position, wheels locked, appropriate side rails in place/Call bell, personal items and telephone in reach/Instruct patient to call for assistance before getting out of bed or chair/Non-slip footwear when patient is out of bed/[place] to call system/Physically safe environment - no spills, clutter or unnecessary equipment/Purposeful Proactive Rounding/Room/bathroom lighting operational, light cord in reach

## 2024-04-11 ENCOUNTER — TRANSCRIPTION ENCOUNTER (OUTPATIENT)
Age: 24
End: 2024-04-11

## 2024-04-11 ENCOUNTER — RESULT REVIEW (OUTPATIENT)
Age: 24
End: 2024-04-11

## 2024-04-11 LAB
ANION GAP SERPL CALC-SCNC: 14 MMOL/L — SIGNIFICANT CHANGE UP (ref 7–14)
BASOPHILS # BLD AUTO: 0.1 K/UL — SIGNIFICANT CHANGE UP (ref 0–0.2)
BASOPHILS NFR BLD AUTO: 1.2 % — HIGH (ref 0–1)
BUN SERPL-MCNC: 11 MG/DL — SIGNIFICANT CHANGE UP (ref 10–20)
CALCIUM SERPL-MCNC: 9.3 MG/DL — SIGNIFICANT CHANGE UP (ref 8.4–10.5)
CHLORIDE SERPL-SCNC: 105 MMOL/L — SIGNIFICANT CHANGE UP (ref 98–110)
CO2 SERPL-SCNC: 22 MMOL/L — SIGNIFICANT CHANGE UP (ref 17–32)
CREAT SERPL-MCNC: 1 MG/DL — SIGNIFICANT CHANGE UP (ref 0.7–1.5)
EGFR: 109 ML/MIN/1.73M2 — SIGNIFICANT CHANGE UP
EOSINOPHIL # BLD AUTO: 0.43 K/UL — SIGNIFICANT CHANGE UP (ref 0–0.7)
EOSINOPHIL NFR BLD AUTO: 5.3 % — SIGNIFICANT CHANGE UP (ref 0–8)
GLUCOSE SERPL-MCNC: 120 MG/DL — HIGH (ref 70–99)
HAPTOGLOB SERPL-MCNC: 239 MG/DL — HIGH (ref 34–200)
HCT VFR BLD CALC: 25.2 % — LOW (ref 42–52)
HCT VFR BLD CALC: 29.3 % — LOW (ref 42–52)
HGB BLD-MCNC: 6.7 G/DL — CRITICAL LOW (ref 14–18)
HGB BLD-MCNC: 8.3 G/DL — LOW (ref 14–18)
IMM GRANULOCYTES NFR BLD AUTO: 0.6 % — HIGH (ref 0.1–0.3)
LYMPHOCYTES # BLD AUTO: 2.03 K/UL — SIGNIFICANT CHANGE UP (ref 1.2–3.4)
LYMPHOCYTES # BLD AUTO: 24.8 % — SIGNIFICANT CHANGE UP (ref 20.5–51.1)
MAGNESIUM SERPL-MCNC: 2.2 MG/DL — SIGNIFICANT CHANGE UP (ref 1.8–2.4)
MCHC RBC-ENTMCNC: 16 PG — LOW (ref 27–31)
MCHC RBC-ENTMCNC: 17.4 PG — LOW (ref 27–31)
MCHC RBC-ENTMCNC: 26.6 G/DL — LOW (ref 32–37)
MCHC RBC-ENTMCNC: 28.3 G/DL — LOW (ref 32–37)
MCV RBC AUTO: 60.1 FL — LOW (ref 80–94)
MCV RBC AUTO: 61.3 FL — LOW (ref 80–94)
MONOCYTES # BLD AUTO: 0.85 K/UL — HIGH (ref 0.1–0.6)
MONOCYTES NFR BLD AUTO: 10.4 % — HIGH (ref 1.7–9.3)
NEUTROPHILS # BLD AUTO: 4.72 K/UL — SIGNIFICANT CHANGE UP (ref 1.4–6.5)
NEUTROPHILS NFR BLD AUTO: 57.7 % — SIGNIFICANT CHANGE UP (ref 42.2–75.2)
NRBC # BLD: 2 /100 WBCS — HIGH (ref 0–0)
NRBC # BLD: 2 /100 WBCS — HIGH (ref 0–0)
PLATELET # BLD AUTO: 575 K/UL — HIGH (ref 130–400)
PLATELET # BLD AUTO: 586 K/UL — HIGH (ref 130–400)
PMV BLD: 8.9 FL — SIGNIFICANT CHANGE UP (ref 7.4–10.4)
PMV BLD: 9.1 FL — SIGNIFICANT CHANGE UP (ref 7.4–10.4)
POTASSIUM SERPL-MCNC: 4.6 MMOL/L — SIGNIFICANT CHANGE UP (ref 3.5–5)
POTASSIUM SERPL-SCNC: 4.6 MMOL/L — SIGNIFICANT CHANGE UP (ref 3.5–5)
RBC # BLD: 4.19 M/UL — LOW (ref 4.7–6.1)
RBC # BLD: 4.78 M/UL — SIGNIFICANT CHANGE UP (ref 4.7–6.1)
RBC # FLD: 33.2 % — HIGH (ref 11.5–14.5)
RBC # FLD: 34.9 % — HIGH (ref 11.5–14.5)
SODIUM SERPL-SCNC: 141 MMOL/L — SIGNIFICANT CHANGE UP (ref 135–146)
TTG IGA SER-ACNC: <1.2 U/ML — SIGNIFICANT CHANGE UP
TTG IGA SER-ACNC: NEGATIVE — SIGNIFICANT CHANGE UP
TTG IGG SER IA-ACNC: ABNORMAL
TTG IGG SER-ACNC: 6.2 U/ML — HIGH
WBC # BLD: 7.15 K/UL — SIGNIFICANT CHANGE UP (ref 4.8–10.8)
WBC # BLD: 8.18 K/UL — SIGNIFICANT CHANGE UP (ref 4.8–10.8)
WBC # FLD AUTO: 7.15 K/UL — SIGNIFICANT CHANGE UP (ref 4.8–10.8)
WBC # FLD AUTO: 8.18 K/UL — SIGNIFICANT CHANGE UP (ref 4.8–10.8)

## 2024-04-11 PROCEDURE — 43239 EGD BIOPSY SINGLE/MULTIPLE: CPT

## 2024-04-11 PROCEDURE — 99232 SBSQ HOSP IP/OBS MODERATE 35: CPT

## 2024-04-11 PROCEDURE — 88312 SPECIAL STAINS GROUP 1: CPT | Mod: 26

## 2024-04-11 PROCEDURE — 88305 TISSUE EXAM BY PATHOLOGIST: CPT | Mod: 26

## 2024-04-11 PROCEDURE — 99222 1ST HOSP IP/OBS MODERATE 55: CPT

## 2024-04-11 RX ORDER — PREGABALIN 225 MG/1
1000 CAPSULE ORAL ONCE
Refills: 0 | Status: DISCONTINUED | OUTPATIENT
Start: 2024-04-11 | End: 2024-04-13

## 2024-04-11 RX ORDER — PANTOPRAZOLE SODIUM 20 MG/1
40 TABLET, DELAYED RELEASE ORAL
Refills: 0 | Status: DISCONTINUED | OUTPATIENT
Start: 2024-04-11 | End: 2024-04-13

## 2024-04-11 RX ORDER — FOLIC ACID 0.8 MG
1 TABLET ORAL ONCE
Refills: 0 | Status: DISCONTINUED | OUTPATIENT
Start: 2024-04-11 | End: 2024-04-13

## 2024-04-11 RX ORDER — PREGABALIN 225 MG/1
1000 CAPSULE ORAL ONCE
Refills: 0 | Status: COMPLETED | OUTPATIENT
Start: 2024-04-11 | End: 2024-04-11

## 2024-04-11 RX ORDER — FOLIC ACID 0.8 MG
1 TABLET ORAL DAILY
Refills: 0 | Status: DISCONTINUED | OUTPATIENT
Start: 2024-04-11 | End: 2024-04-13

## 2024-04-11 RX ORDER — PREGABALIN 225 MG/1
1000 CAPSULE ORAL DAILY
Refills: 0 | Status: DISCONTINUED | OUTPATIENT
Start: 2024-04-12 | End: 2024-04-13

## 2024-04-11 RX ADMIN — Medication 1 MILLIGRAM(S): at 13:37

## 2024-04-11 RX ADMIN — PANTOPRAZOLE SODIUM 40 MILLIGRAM(S): 20 TABLET, DELAYED RELEASE ORAL at 05:22

## 2024-04-11 RX ADMIN — PREGABALIN 1000 MICROGRAM(S): 225 CAPSULE ORAL at 13:37

## 2024-04-11 RX ADMIN — IRON SUCROSE 210 MILLIGRAM(S): 20 INJECTION, SOLUTION INTRAVENOUS at 17:47

## 2024-04-11 RX ADMIN — Medication 20 MILLIGRAM(S): at 13:37

## 2024-04-11 RX ADMIN — PANTOPRAZOLE SODIUM 40 MILLIGRAM(S): 20 TABLET, DELAYED RELEASE ORAL at 17:47

## 2024-04-11 NOTE — CONSULT NOTE ADULT - SUBJECTIVE AND OBJECTIVE BOX
Hematology/Oncology Consult Note    HPI:  23M w/ PMHx h/o Anemia (baseline hgb of 9, never had transfusions), Sickle Cell Trait (not disease), Learning Disability, ADHD, HLD and KIMMIE presents the ED for anemia and sent in by his pulmonary doctor for a hemoglobin of 5.0. Admits to having dyspnea on exertion, lightheadedness and intermittent dysphagia w/ globus sensation resulting in vomitus of food. Pt denied having previous symptoms before seeing pulmonologist and recent admission January 2024. Denies fevers, chills, chest pain, diarrhea, abdominal pain, hematochezia, melena or hematuria. Never had EGD/colonoscopy. Denies recreational drug use. Of note, patient was recently admitted in Jan 2024 for hemoptysis. Patient was seen by infectious disease, pulm and ENT. Hemoptysis was attributed to viral bronchitis. Sputum cultures were negative for AFB - r/o-ed TB and CTA was negative for PE.    Heme/Onc: Pt states feeling better today since being admitted 4/9/24. Pt denies any lightheadedness, dizziness, or fatigue. Pt is awaiting EGD for today. On admission, Hgb levels were 4.8 and given 2 units of pRBC, Hgb jumped up to 7.1 and currently is 6.7; awaiting transfusion of 1 unit of pRBC. Pt denied any blood in stool since admission or even previously at home; Pt diet is a variety of meats and veggies. Pt states seeing a heme doctor outpatient but is unsure of name and number of physician. Will talk with mom or family member about additional information.     Vitals: Temp 99.1F, /63, , RR 18, SpO2 98% on RA  Labs: Hgb 4.8 (previously 9.1), MCV 51.8, Platelet 636, Retic % 2.2, Total Iron 15, %sat 4, Cr 0.9 (previously 1.2),    EKG - Sinus Tach  In the ED:  - s/p 2u pRBCs      Oncology Hx: none      Allergies    No Known Allergies    Intolerances        MEDICATIONS  (STANDING):  cyanocobalamin 1000 MICROGram(s) Oral once  cyanocobalamin Injectable 1000 MICROGram(s) IntraMuscular once  FLUoxetine 20 milliGRAM(s) Oral daily  folic acid 1 milliGRAM(s) Oral daily  folic acid 1 milliGRAM(s) Oral once  iron sucrose IVPB 100 milliGRAM(s) IV Intermittent every 24 hours  pantoprazole  Injectable 40 milliGRAM(s) IV Push every 12 hours    MEDICATIONS  (PRN):  acetaminophen     Tablet .. 650 milliGRAM(s) Oral every 6 hours PRN Temp greater or equal to 38C (100.4F), Mild Pain (1 - 3)  aluminum hydroxide/magnesium hydroxide/simethicone Suspension 30 milliLiter(s) Oral every 4 hours PRN Dyspepsia  melatonin 3 milliGRAM(s) Oral at bedtime PRN Insomnia  ondansetron Injectable 4 milliGRAM(s) IV Push every 8 hours PRN Nausea and/or Vomiting      PAST MEDICAL & SURGICAL HISTORY:  ADHD      Learning disability      HLD (hyperlipidemia)      Sickle cell trait      Anemia      No significant past surgical history          FAMILY HISTORY:      SOCIAL HISTORY: No EtOH, no tobacco    REVIEW OF SYSTEMS:    CONSTITUTIONAL: No weakness, fevers or chills  EYES/ENT: No visual changes;  No vertigo or throat pain   NECK: No pain or stiffness  RESPIRATORY: No cough, wheezing, hemoptysis; No shortness of breath  CARDIOVASCULAR: No chest pain or palpitations  GASTROINTESTINAL: No abdominal or epigastric pain. No nausea, vomiting, or hematemesis; No diarrhea or constipation. No melena or hematochezia.  GENITOURINARY: No dysuria, frequency or hematuria  NEUROLOGICAL: No numbness or weakness  SKIN: No itching, burning, rashes, or lesions   All other review of systems is negative unless indicated above.    Height (cm): 172.7 (04-11 @ 08:56)  Weight (kg): 105.4 (04-11 @ 08:56)  BMI (kg/m2): 35.3 (04-11 @ 08:56)  BSA (m2): 2.18 (04-11 @ 08:56)    T(F): 96.5 (04-11-24 @ 08:56), Max: 99.1 (04-10-24 @ 16:08)  HR: 88 (04-11-24 @ 08:56)  BP: 112/67 (04-11-24 @ 08:56)  RR: 18 (04-11-24 @ 08:56)  SpO2: 100% (04-11-24 @ 08:56)  Wt(kg): --    GENERAL: NAD, well-developed, resting in bed  HEAD:  Atraumatic, Normocephalic  EYES: EOMI, PERRLA, conjunctiva and sclera clear  NECK: Supple, No JVD  CHEST/LUNG: Clear to auscultation bilaterally; No wheeze or ronchi  HEART: Regular rate and rhythm; No murmurs, rubs, or gallops  ABDOMEN: Soft, Nontender, Nondistended; Bowel sounds present  EXTREMITIES:  2+ Peripheral Pulses, No clubbing, cyanosis, or edema  NEUROLOGY: non-focal  SKIN: No rashes or lesions  PSYCH: AOx3                          6.7    7.15  )-----------( 575      ( 11 Apr 2024 06:45 )             25.2       04-11    141  |  105  |  11  ----------------------------<  120<H>  4.6   |  22  |  1.0    Ca    9.3      11 Apr 2024 06:45  Mg     2.2     04-11    TPro  6.5  /  Alb  4.1  /  TBili  0.6  /  DBili  x   /  AST  11  /  ALT  8   /  AlkPhos  69  04-10      Magnesium: 2.2 mg/dL (04-11 @ 06:45)       Hematology/Oncology Consult Note    HPI:  22 yo M w/ PMHx h/o Anemia (baseline hgb of 9, never had transfusions), Sickle Cell Trait (not disease), learning Disability, ADHD, HLD and KIMMIE, presents the ED for anemia and sent in by his pulmonary doctor for a hemoglobin of 5.0. Admits to having dyspnea on exertion, lightheadedness and intermittent dysphagia w/ globus sensation resulting in vomitus of food. Pt denied having previous symptoms before seeing pulmonologist. Recent admission January 2024. Denies fevers, chills, chest pain, diarrhea, abdominal pain, hematochezia, melena or hematuria. Never had EGD/colonoscopy. Denies recreational drug use. Of note, patient was recently admitted in Jan 2024 for hemoptysis. Patient was seen by infectious disease, pulm and ENT. Hemoptysis was attributed to viral bronchitis. Sputum cultures were negative for AFB - r/o-ed TB and CTA was negative for PE.    Heme/Onc: Pt states feeling better today since being admitted 4/9/24. Pt denies any lightheadedness, dizziness, or fatigue. Pt is awaiting EGD for today. On admission, Hgb levels were 4.8 and given 2 units of pRBC, went up to 7.1 and currently is 6.7; awaiting transfusion of 1 unit of pRBC. Pt denied any blood in stool since admission or even previously at home; Pt's diet is a variety of meats and veggies. Pt states seeing a heme doctor outpatient but is unsure of name and number of physician. Will talk with mom or family member about additional information.     Vitals: Temp 99.1F, /63, , RR 18, SpO2 98% on RA  Labs: Hgb 4.8 (previously 9.1), MCV 51.8, Platelet 636, Retic % 2.2, Total Iron 15, %sat 4, Cr 0.9 (previously 1.2),    EKG - Sinus Tach  In the ED:  - s/p 2u pRBCs      Oncology Hx: none      Allergies    No Known Allergies    Intolerances        MEDICATIONS  (STANDING):  cyanocobalamin 1000 MICROGram(s) Oral once  cyanocobalamin Injectable 1000 MICROGram(s) IntraMuscular once  FLUoxetine 20 milliGRAM(s) Oral daily  folic acid 1 milliGRAM(s) Oral daily  folic acid 1 milliGRAM(s) Oral once  iron sucrose IVPB 100 milliGRAM(s) IV Intermittent every 24 hours  pantoprazole  Injectable 40 milliGRAM(s) IV Push every 12 hours    MEDICATIONS  (PRN):  acetaminophen     Tablet .. 650 milliGRAM(s) Oral every 6 hours PRN Temp greater or equal to 38C (100.4F), Mild Pain (1 - 3)  aluminum hydroxide/magnesium hydroxide/simethicone Suspension 30 milliLiter(s) Oral every 4 hours PRN Dyspepsia  melatonin 3 milliGRAM(s) Oral at bedtime PRN Insomnia  ondansetron Injectable 4 milliGRAM(s) IV Push every 8 hours PRN Nausea and/or Vomiting      PAST MEDICAL & SURGICAL HISTORY:  ADHD      Learning disability      HLD (hyperlipidemia)      Sickle cell trait      Anemia      No significant past surgical history          FAMILY HISTORY:      SOCIAL HISTORY: No EtOH, no tobacco    REVIEW OF SYSTEMS:    CONSTITUTIONAL: No weakness, fevers or chills  EYES/ENT: No visual changes;  No vertigo or throat pain   NECK: No pain or stiffness  RESPIRATORY: No cough, wheezing, hemoptysis; No shortness of breath  CARDIOVASCULAR: No chest pain or palpitations  GASTROINTESTINAL: No abdominal or epigastric pain. No nausea, vomiting, or hematemesis; No diarrhea or constipation. No melena or hematochezia.  GENITOURINARY: No dysuria, frequency or hematuria  NEUROLOGICAL: No numbness or weakness  SKIN: No itching, burning, rashes, or lesions   All other review of systems is negative unless indicated above.    Height (cm): 172.7 (04-11 @ 08:56)  Weight (kg): 105.4 (04-11 @ 08:56)  BMI (kg/m2): 35.3 (04-11 @ 08:56)  BSA (m2): 2.18 (04-11 @ 08:56)    T(F): 96.5 (04-11-24 @ 08:56), Max: 99.1 (04-10-24 @ 16:08)  HR: 88 (04-11-24 @ 08:56)  BP: 112/67 (04-11-24 @ 08:56)  RR: 18 (04-11-24 @ 08:56)  SpO2: 100% (04-11-24 @ 08:56)  Wt(kg): --    GENERAL: NAD, well-developed, resting in bed  HEAD:  Atraumatic, Normocephalic  EYES: EOMI, PERRLA, conjunctivae and sclerae clear  NECK: Supple, No JVD  CHEST/LUNG: Clear to auscultation bilaterally; No wheeze or rhonchi  HEART: Regular rate and rhythm; No murmurs, rubs, or gallops  ABDOMEN: Soft, Nontender, Nondistended; Bowel sounds present  EXTREMITIES:  2+ Peripheral Pulses, No clubbing, cyanosis, or edema  NEUROLOGY: non-focal  SKIN: No rashes or lesions  PSYCH: AOx3                          6.7    7.15  )-----------( 575      ( 11 Apr 2024 06:45 )             25.2       04-11    141  |  105  |  11  ----------------------------<  120<H>  4.6   |  22  |  1.0    Ca    9.3      11 Apr 2024 06:45  Mg     2.2     04-11    TPro  6.5  /  Alb  4.1  /  TBili  0.6  /  DBili  x   /  AST  11  /  ALT  8   /  AlkPhos  69  04-10      Magnesium: 2.2 mg/dL (04-11 @ 06:45)

## 2024-04-11 NOTE — CHART NOTE - NSCHARTNOTEFT_GEN_A_CORE
PACU ANESTHESIA ADMISSION NOTE      Procedure: EGD  Post op diagnosis:      ____  Intubated  TV:______       Rate: ______      FiO2: ______    __x__  Patent Airway    __x__  Full return of protective reflexes    _x___  Full recovery from anesthesia / back to baseline     Vitals:   T:   97        R:   14               BP: 112/66                 Sat: 96%                  P: 100      Mental Status:  __x__ Awake   __x___ Alert   _____ Drowsy   _____ Sedated    Nausea/Vomiting:  x____ NO  ______Yes,   See Post - Op Orders          Pain Scale (0-10):  _____    Treatment: ____ None    _x__ See Post - Op/PCA Orders    Post - Operative Fluids:   ____ Oral   _x___ See Post - Op Orders    Plan: Discharge:   ____Home       __x___Floor     _____Critical Care    _____  Other:_________________    Comments: patient hemodynamically stable. Handoff endorsed to PACU RN. No anesthesia related issues present. To be transferred to floor when criteria met

## 2024-04-11 NOTE — CONSULT NOTE ADULT - ASSESSMENT
ASSESSMENT    #Symptomatic anemia, symptoms resolved  #Microcytic Anemia, 2/2 iron deficiency anemia   #Sickle cell trait    - Hgb on admission 4.8 (baseline 9.0-9.1, 01/2024) s/p 2 units of pRBC; Hgb 4/11: 6.7  - microcytic anemia: MCV 60.1, low total iron 15, low ferritin 3, low %sat iron 4; currently on iron sucrose IV  - symptoms of lightheadedness, fatigue, now resolved per patient   - no history of blood in stool or previous EGD/colonoscopy in the past  - hx of sickle cell trait, unsure of outpatient heme physician    Plan:  - continue to trend and monitor H/H  - transfuse pRBC when Hgb <7  - continue iron sucrose IV  - obtain information regarding outpatient heme doctor from family member   ASSESSMENT    #Symptomatic anemia, symptoms resolved  #Microcytic Anemia 2/2 iron deficiency anemia   #Sickle cell trait  #Folate deficiency     - Hgb on admission 4.8 (baseline 9.0-9.1, 01/2024) s/p 2 units of pRBC; Hgb 4/11: 6.7  - microcytic anemia: MCV 60.1, low total iron 15, low ferritin 3, low %sat iron 4; currently on iron sucrose IV, elevated RDW  - absolute reticulocyte count of 0.5: RI 0.36; hypoproliferation   - vitamin B12 277, low folate 2.8  - symptoms of lightheadedness, fatigue, now resolved per patient   - no history of blood in stool or previous EGD/colonoscopy in the past  - hx of sickle cell trait, unsure of outpatient heme physician    Plan:  - continue to trend and monitor H/H  - transfuse pRBC when Hgb <7  - continue iron sucrose IV q24h  - c/w folic acid supplements  - obtain serum methylmalonic acid levels for r/o Vit B12 def  - f/u with GI recs and work up for any underlying bleeding/iron deficiency causes  - obtain information regarding outpatient heme doctor from family member   ASSESSMENT    #Symptomatic anemia, symptoms resolved  #Microcytic Anemia 2/2 iron deficiency anemia   #Sickle cell trait  #Folate deficiency   #Thrombocytosis, reactive from BETTY    - Hgb on admission 4.8 (baseline 9.0-9.1, 01/2024) s/p 2 units of pRBC; Hgb 4/11: 6.7  - microcytic anemia: MCV 60.1, low total iron 15, low ferritin 3, low %sat iron 4; currently on iron sucrose IV, elevated RDW  - absolute reticulocyte count of 0.5: RI 0.36; hypoproliferation   - vitamin B12 277, low folate 2.8  - symptoms of lightheadedness, fatigue, now resolved per patient   - no history of blood in stool or previous EGD/colonoscopy in the past  - hx of sickle cell trait, unsure of outpatient heme physician    Plan:  - continue to trend and monitor H/H  - transfuse pRBC when Hgb <7  - continue iron sucrose IV q24h  - c/w folic acid supplements  - obtain serum methylmalonic acid levels for r/o Vit B12 def  - f/u with GI recs and work up for any underlying bleeding/iron deficiency causes  - obtain information regarding outpatient heme doctor from family member   ASSESSMENT      # Microcytic Anemia 2/2 iron deficiency anemia   # Hx of Sickle cell trait?  # Folate deficiency   # Thrombocytosis, reactive from BETTY    - Hgb on admission 4.8 (baseline 9.0-9.1, 01/2024) s/p 2 units of pRBC; Hgb 4/11: 6.7  - microcytic anemia: MCV 60.1, low total iron 15, low ferritin 3, low %sat iron 4; currently on iron sucrose IV, elevated RDW  - absolute reticulocyte count of 0.5: RI 0.36; hypoproliferation   - vitamin B12 277, low folate 2.8  - symptoms of lightheadedness, fatigue, now resolved per patient   - no history of blood in stool or previous EGD/colonoscopy in the past  - hx of sickle cell trait, unsure of outpatient heme physician    Plan:  - continue to trend and monitor H/H  - transfuse pRBC when Hgb <7  - continue iron sucrose IV q24h x 5 doses (total)  - c/w folic acid supplements  - obtain serum methylmalonic acid levels for r/o Vit B12 def  - f/u with GI recs and work up for any underlying bleeding/iron deficiency causes  - obtain information regarding outpatient heme doctor from family member   ASSESSMENT      # Microcytic Anemia 2/2 iron deficiency anemia   # Hx of Sickle cell trait?  # Folate deficiency (only 2.8)  # Thrombocytosis, reactive from BETTY    - Hgb on admission 4.8 (baseline 9.0-9.1, 01/2024) s/p 2 units of pRBC; Hgb 4/11: 6.7  - microcytic anemia: MCV 60.1, low total iron 15, low ferritin 3, low %sat iron 4; currently on iron sucrose IV, elevated RDW  - absolute reticulocyte count of 0.5: RI 0.36; hypoproliferation   - vitamin B12 277, low folate 2.8  - symptoms of lightheadedness, fatigue, now resolved per patient   - no history of blood in stool or previous EGD/colonoscopy in the past  - hx of sickle cell trait, unsure of outpatient heme physician    Plan:  - continue to trend and monitor H/H  - transfuse pRBC when Hgb <7  - continue iron sucrose IV q24h x 5 doses (total)  - c/w folic acid supplements  - obtain serum methylmalonic acid levels for r/o Vit B12 def  - f/u with GI recommendations and work up for any underlying bleeding/iron deficiency causes  - obtain information regarding outpatient heme doctor from family member  - if GI work up negative for any bleeding source, consider malabsorption studies.  - further recommendations, as needed, after the above completed.   ASSESSMENT      # Microcytic Anemia 2/2 iron deficiency anemia   # Hx of Sickle cell trait?  # Folate deficiency (only 2.8)  # Thrombocytosis, reactive from BETTY    - Hgb on admission 4.8 (baseline 9.0-9.1, 01/2024) s/p 2 units of pRBC; Hgb 4/11: 6.7  - microcytic anemia: MCV 60.1, low total iron 15, low ferritin 3, low %sat iron 4; currently on iron sucrose IV, elevated RDW  - absolute reticulocyte count of 0.5: RI 0.36; hypoproliferation   - vitamin B12 277, low folate 2.8  - symptoms of lightheadedness, fatigue, now resolved per patient   - no history of blood in stool or previous EGD/colonoscopy in the past  - hx of sickle cell trait, unsure of outpatient heme physician    Plan:  - continue to trend and monitor H/H  - transfuse pRBC when Hgb <7  - continue iron sucrose IV q24h x 5 doses (total)  - c/w folic acid supplements after making sure that vitamin B12/MMA are within normal limits.  - obtain serum methylmalonic acid levels for r/o Vit B12 def  - f/u with GI recommendations and work up for any underlying bleeding/iron deficiency causes  - obtain information regarding outpatient heme doctor from family member  - if GI work up (EGD, colonoscopy, capsule endoscopy) negative for any bleeding source, consider malabsorption studies.  - further recommendations, as needed, after the above completed.

## 2024-04-11 NOTE — PRE-ANESTHESIA EVALUATION ADULT - NSANTHPMHFT_GEN_ALL_CORE
Chart reviewed, pt interviewed and examined, Med, GI, Pulmonary evaluation seen. pt interviewed and examined. Labs, EKG seen. H/H 4.8/18.8 on admission pt on 3rd unit of blood. H/H 6.7/25.2 after 2 units of blood.

## 2024-04-11 NOTE — CONSULT NOTE ADULT - SUBJECTIVE AND OBJECTIVE BOX
Patient is a 23y old  Male who presents with a chief complaint of Symptomatic Anemia (10 Apr 2024 14:23)      HPI:  23M w/ PMHx h/o Anemia (baseline hgb of 9, never had transfusions), Sickle Cell Trait (not disease), Learning Disability, ADHD, HLD and KIMMIE presents the ED for anemia and sent in by his pulmonary doctor for a hemoglobin of 5.0. Admits to having dyspnea on exertion, lightheadedness and intermittent dysphagia w/ globus sensation resulting in vomitus of food. Denies fevers, chills, chest pain, diarrhea abdominal pain, hematochezia, melena or hematuria. Never had EGD/colonoscopy. Denies recreational drug use. Of note, patient was recently admitted in Jan 2024 for hemoptysis. Patient was seen by infectious disease, pulm and ENT. Hemoptysis was attributed to possible viral bronchitis. Sputum cultures were negative for AFB - r/o-ed TB and CTA was negative for PE.    Vitals: Temp 99.1F, /63, , RR 18, SpO2 98% on RA    Labs: Hgb 4.8 (previously 9.1), MCV 51.8, Platelet 636, Retic % 2.2, Total Iron 15, %sat 4, Cr 0.9 (previously 1.2),      EKG - Sinus Tach    In the ED:  - s/p 2u pRBCs    Admitted to medicine for workup and management of symptomatic anemia. (09 Apr 2024 20:33)      PAST MEDICAL & SURGICAL HISTORY:  ADHD      Learning disability      HLD (hyperlipidemia)      Sickle cell trait      Anemia      No significant past surgical history          SOCIAL HX:   Smoking- Never                    ETOH- Denies                             Other- Denies all illicit drug use     FAMILY HISTORY:  .  No cardiovascular or pulmonary family history     REVIEW OF SYSTEMS:    All ROS are negative exept per HPI       Allergies    No Known Allergies    Intolerances          PHYSICAL EXAM  Vital Signs Last 24 Hrs  T(C): 35.8 (11 Apr 2024 04:42), Max: 37.3 (10 Apr 2024 16:08)  T(F): 96.5 (11 Apr 2024 04:42), Max: 99.1 (10 Apr 2024 16:08)  HR: 88 (11 Apr 2024 04:42) (88 - 106)  BP: 112/67 (11 Apr 2024 04:42) (112/67 - 130/63)  BP(mean): 84 (11 Apr 2024 04:42) (84 - 84)  RR: 18 (11 Apr 2024 04:42) (18 - 18)  SpO2: 100% (11 Apr 2024 04:42) (98% - 100%)    Parameters below as of 11 Apr 2024 04:42  Patient On (Oxygen Delivery Method): BiPAP/CPAP        CONSTITUTIONAL:  Well nourished.  NAD    ENT:   Airway patent,   No thrush  Elongated Uvula    EYES:   Clear bilaterally,   pupils equal,   round and reactive to light.    CARDIAC:   Normal rate,   regular rhythm.    no edema    RESPIRATORY:   No wheezing   Normal chest expansion  Not tachypneic,  No use of accessory muscles    GASTROINTESTINAL:  Abdomen soft, non-tender,   No guarding,   Positive BS    MUSCULOSKELETAL:   Range of motion is not limited,  No clubbing, cyanosis    NEUROLOGICAL:   Alert and oriented   No motor deficits.    SKIN:   Skin normal color for race,   No evidence of rash.      LABS:                          6.7    7.15  )-----------( 575      ( 11 Apr 2024 06:45 )             25.2                                               04-11    141  |  105  |  11  ----------------------------<  120<H>  4.6   |  22  |  1.0    Ca    9.3      11 Apr 2024 06:45  Mg     2.2     04-11    TPro  6.5  /  Alb  4.1  /  TBili  0.6  /  DBili  x   /  AST  11  /  ALT  8   /  AlkPhos  69  04-10      PT/INR - ( 09 Apr 2024 16:50 )   PT: 13.60 sec;   INR: 1.19 ratio         PTT - ( 09 Apr 2024 16:50 )  PTT:27.3 sec                                       Urinalysis Basic - ( 11 Apr 2024 06:45 )    Color: x / Appearance: x / SG: x / pH: x  Gluc: 120 mg/dL / Ketone: x  / Bili: x / Urobili: x   Blood: x / Protein: x / Nitrite: x   Leuk Esterase: x / RBC: x / WBC x   Sq Epi: x / Non Sq Epi: x / Bacteria: x                                                  LIVER FUNCTIONS - ( 10 Apr 2024 09:04 )  Alb: 4.1 g/dL / Pro: 6.5 g/dL / ALK PHOS: 69 U/L / ALT: 8 U/L / AST: 11 U/L / GGT: x                                                                                                MEDICATIONS  (STANDING):  cyanocobalamin 1000 MICROGram(s) Oral once  cyanocobalamin Injectable 1000 MICROGram(s) IntraMuscular once  FLUoxetine 20 milliGRAM(s) Oral daily  folic acid 1 milliGRAM(s) Oral daily  folic acid 1 milliGRAM(s) Oral once  iron sucrose IVPB 100 milliGRAM(s) IV Intermittent every 24 hours  pantoprazole  Injectable 40 milliGRAM(s) IV Push every 12 hours    MEDICATIONS  (PRN):  acetaminophen     Tablet .. 650 milliGRAM(s) Oral every 6 hours PRN Temp greater or equal to 38C (100.4F), Mild Pain (1 - 3)  aluminum hydroxide/magnesium hydroxide/simethicone Suspension 30 milliLiter(s) Oral every 4 hours PRN Dyspepsia  melatonin 3 milliGRAM(s) Oral at bedtime PRN Insomnia  ondansetron Injectable 4 milliGRAM(s) IV Push every 8 hours PRN Nausea and/or Vomiting

## 2024-04-11 NOTE — PROGRESS NOTE ADULT - SUBJECTIVE AND OBJECTIVE BOX
24H events:    Patient is a 23y old Male who presents with a chief complaint of Symptomatic Anemia (11 Apr 2024 09:15)    Primary diagnosis of Anemia        Today is hospital day 2d. This morning patient was seen and examined at bedside, resting comfortably in bed.    No acute or major events overnight.    Code Status:    Family communication:  Contact date:  Name of person contacted:  Relationship to patient:  Communication details:  What matters most:    PAST MEDICAL & SURGICAL HISTORY  ADHD    Learning disability    HLD (hyperlipidemia)    Sickle cell trait    Anemia    No significant past surgical history      SOCIAL HISTORY:  Social History:      ALLERGIES:  No Known Allergies    MEDICATIONS:  STANDING MEDICATIONS  cyanocobalamin 1000 MICROGram(s) Oral once  cyanocobalamin Injectable 1000 MICROGram(s) IntraMuscular once  FLUoxetine 20 milliGRAM(s) Oral daily  folic acid 1 milliGRAM(s) Oral daily  folic acid 1 milliGRAM(s) Oral once  iron sucrose IVPB 100 milliGRAM(s) IV Intermittent every 24 hours  pantoprazole  Injectable 40 milliGRAM(s) IV Push every 12 hours    PRN MEDICATIONS  acetaminophen     Tablet .. 650 milliGRAM(s) Oral every 6 hours PRN  aluminum hydroxide/magnesium hydroxide/simethicone Suspension 30 milliLiter(s) Oral every 4 hours PRN  melatonin 3 milliGRAM(s) Oral at bedtime PRN  ondansetron Injectable 4 milliGRAM(s) IV Push every 8 hours PRN    VITALS:   T(F): 96.5  HR: 88  BP: 112/67  RR: 18  SpO2: 100%    PHYSICAL EXAM:  GENERAL:   ( x ) NAD, lying in bed comfortably     (  ) obtunded     (  ) lethargic     (  ) somnolent    HEAD:   ( x ) Atraumatic     (  ) hematoma     (  ) laceration (specify location:       )     NECK:  ( x ) Supple     (  ) neck stiffness     (  ) nuchal rigidity     (  )  no JVD     (  ) JVD present ( -- cm)    HEART:  Rate -->     (  ) normal rate     (  ) bradycardic     (  ) tachycardic  Rhythm -->     ( x ) regular     (  ) regularly irregular     (  ) irregularly irregular  Murmurs -->     (  ) normal s1s2     (  ) systolic murmur     (  ) diastolic murmur     (  ) continuous murmur      (  ) S3 present     (  ) S4 present    LUNGS:   (x  )Unlabored respirations     (  ) tachypnea  ( x ) B/L air entry     (  ) decreased breath sounds in:  (location     )    (  ) no adventitious sound     (  ) crackles     (  ) wheezing      (  ) rhonchi      (specify location:       )  (  ) chest wall tenderness (specify location:       )    ABDOMEN:   ( x ) Soft     (  ) tense   |   (  x) nondistended     (  ) distended   |   (  ) +BS     (  ) hypoactive bowel sounds     (  ) hyperactive bowel sounds  ( x ) nontender     (  ) RUQ tenderness     (  ) RLQ tenderness     (  ) LLQ tenderness     (  ) epigastric tenderness     (  ) diffuse tenderness  (  ) Splenomegaly      (  ) Hepatomegaly      (  ) Jaundice     (  ) ecchymosis     EXTREMITIES:  (  ) Normal     (  ) Rash     (  ) ecchymosis     (  ) varicose veins      (  ) pitting edema     (  ) non-pitting edema   (  ) ulceration     (  ) gangrene:     (location:     )    NERVOUS SYSTEM:    (  ) A&Ox3     (  ) confused     (  ) lethargic  CN II-XII:     (  ) Intact     (  ) deficits found     (Specify:     )   Upper extremities:     (  ) no sensorimotor deficits     (  ) weakness     (  ) loss of proprioception/vibration     (  ) loss of touch/temperature (specify:    )  Lower extremities:     (  ) no sensorimotor deficits     (  ) weakness     (  ) loss of proprioception/vibration     (  ) loss of touch/temperature (specify:    )    SKIN:   (  ) No rashes or lesions     (  ) maculopapular rash     (  ) pustules     (  ) vesicles     (  ) ulcer     (  ) ecchymosis     (specify location:     )    AMPAC score:    (  ) Indwelling Awan Catheter:   Date insterted:    Reason (  ) Critical illness     (  ) urinary retention    (  ) Accurate Ins/Outs Monitoring     (  ) CMO patient    (  ) Central Line:   Date inserted:  Location: (  ) Right IJ     (  ) Left IJ     (  ) Right Fem     (  ) Left Fem    (  ) SPC        (  ) pigtail       (  ) PEG tube       (  ) colostomy       (  ) jejunostomy  (  ) U-Dall    LABS:                        6.7    7.15  )-----------( 575      ( 11 Apr 2024 06:45 )             25.2     04-11    141  |  105  |  11  ----------------------------<  120<H>  4.6   |  22  |  1.0    Ca    9.3      11 Apr 2024 06:45  Mg     2.2     04-11    TPro  6.5  /  Alb  4.1  /  TBili  0.6  /  DBili  x   /  AST  11  /  ALT  8   /  AlkPhos  69  04-10    PT/INR - ( 09 Apr 2024 16:50 )   PT: 13.60 sec;   INR: 1.19 ratio         PTT - ( 09 Apr 2024 16:50 )  PTT:27.3 sec  Urinalysis Basic - ( 11 Apr 2024 06:45 )    Color: x / Appearance: x / SG: x / pH: x  Gluc: 120 mg/dL / Ketone: x  / Bili: x / Urobili: x   Blood: x / Protein: x / Nitrite: x   Leuk Esterase: x / RBC: x / WBC x   Sq Epi: x / Non Sq Epi: x / Bacteria: x                RADIOLOGY:           24H events:    Patient is a 23y old Male who presents with a chief complaint of Symptomatic Anemia (11 Apr 2024 09:15)    Primary diagnosis of Anemia        Today is hospital day 2d. This morning patient was seen and examined at bedside, resting comfortably in bed.    No acute or major events overnight.    Code Status:    Family communication:  Contact date:  Name of person contacted:  Relationship to patient:  Communication details:  What matters most:    PAST MEDICAL & SURGICAL HISTORY  ADHD    Learning disability    HLD (hyperlipidemia)    Sickle cell trait    Anemia    No significant past surgical history      SOCIAL HISTORY:  Social History:      ALLERGIES:  No Known Allergies    MEDICATIONS:  STANDING MEDICATIONS  cyanocobalamin 1000 MICROGram(s) Oral once  cyanocobalamin Injectable 1000 MICROGram(s) IntraMuscular once  FLUoxetine 20 milliGRAM(s) Oral daily  folic acid 1 milliGRAM(s) Oral daily  folic acid 1 milliGRAM(s) Oral once  iron sucrose IVPB 100 milliGRAM(s) IV Intermittent every 24 hours  pantoprazole  Injectable 40 milliGRAM(s) IV Push every 12 hours    PRN MEDICATIONS  acetaminophen     Tablet .. 650 milliGRAM(s) Oral every 6 hours PRN  aluminum hydroxide/magnesium hydroxide/simethicone Suspension 30 milliLiter(s) Oral every 4 hours PRN  melatonin 3 milliGRAM(s) Oral at bedtime PRN  ondansetron Injectable 4 milliGRAM(s) IV Push every 8 hours PRN    VITALS:   T(F): 96.5  HR: 88  BP: 112/67  RR: 18  SpO2: 100%    PHYSICAL EXAM:  GENERAL:   ( x ) NAD, lying in bed comfortably     (  ) obtunded     (  ) lethargic     (  ) somnolent    HEAD:   ( x ) Atraumatic     (  ) hematoma     (  ) laceration (specify location:       )     NECK:  ( x ) Supple     (  ) neck stiffness     (  ) nuchal rigidity     (  )  no JVD     (  ) JVD present ( -- cm)    HEART:  Rate -->     (  ) normal rate     (  ) bradycardic     (  ) tachycardic  Rhythm -->     ( x ) regular     (  ) regularly irregular     (  ) irregularly irregular  Murmurs -->     (  ) normal s1s2     (  ) systolic murmur     (  ) diastolic murmur     (  ) continuous murmur      (  ) S3 present     (  ) S4 present    LUNGS:   (x  )Unlabored respirations     (  ) tachypnea  ( x ) B/L air entry     (  ) decreased breath sounds in:  (location     )    (  ) no adventitious sound     (  ) crackles     (  ) wheezing      (  ) rhonchi      (specify location:       )  (  ) chest wall tenderness (specify location:       )    ABDOMEN:   ( x ) Soft     (  ) tense   |   (  x) nondistended     (  ) distended   |   (  ) +BS     (  ) hypoactive bowel sounds     (  ) hyperactive bowel sounds  ( x ) nontender     (  ) RUQ tenderness     (  ) RLQ tenderness     (  ) LLQ tenderness     (  ) epigastric tenderness     (  ) diffuse tenderness  (  ) Splenomegaly      (  ) Hepatomegaly      (  ) Jaundice     (  ) ecchymosis     EXTREMITIES:  (  ) Normal     (  ) Rash     (  ) ecchymosis     (  ) varicose veins      (  ) pitting edema     (  ) non-pitting edema   (  ) ulceration     (  ) gangrene:     (location:     )    NERVOUS SYSTEM:    (  ) A&Ox3     (  ) confused     (  ) lethargic  CN II-XII:     (  ) Intact     (  ) deficits found     (Specify:     )   Upper extremities:     (  ) no sensorimotor deficits     (  ) weakness     (  ) loss of proprioception/vibration     (  ) loss of touch/temperature (specify:    )  Lower extremities:     (  ) no sensorimotor deficits     (  ) weakness     (  ) loss of proprioception/vibration     (  ) loss of touch/temperature (specify:    )    SKIN:   (  ) No rashes or lesions     (  ) maculopapular rash     (  ) pustules     (  ) vesicles     (  ) ulcer     (  ) ecchymosis     (specify location:     )    AMPAC score:    (  ) Indwelling Awan Catheter:   Date insterted:    Reason (  ) Critical illness     (  ) urinary retention    (  ) Accurate Ins/Outs Monitoring     (  ) CMO patient    (  ) Central Line:   Date inserted:  Location: (  ) Right IJ     (  ) Left IJ     (  ) Right Fem     (  ) Left Fem    (  ) SPC        (  ) pigtail       (  ) PEG tube       (  ) colostomy       (  ) jejunostomy  (  ) U-Dall    LABS:                        6.7    7.15  )-----------( 575      ( 11 Apr 2024 06:45 )             25.2     04-11    141  |  105  |  11  ----------------------------<  120<H>  4.6   |  22  |  1.0    Ca    9.3      11 Apr 2024 06:45  Mg     2.2     04-11    TPro  6.5  /  Alb  4.1  /  TBili  0.6  /  DBili  x   /  AST  11  /  ALT  8   /  AlkPhos  69  04-10    PT/INR - ( 09 Apr 2024 16:50 )   PT: 13.60 sec;   INR: 1.19 ratio         PTT - ( 09 Apr 2024 16:50 )  PTT:27.3 sec  Urinalysis Basic - ( 11 Apr 2024 06:45 )    Color: x / Appearance: x / SG: x / pH: x  Gluc: 120 mg/dL / Ketone: x  / Bili: x / Urobili: x   Blood: x / Protein: x / Nitrite: x   Leuk Esterase: x / RBC: x / WBC x   Sq Epi: x / Non Sq Epi: x / Bacteria: x                RADIOLOGY:    < from: Xray Chest 2 Views PA/Lat (04.08.24 @ 13:58) >  Impression:    No radiographic evidence of acute cardiopulmonary disease.        --- End of Report ---    < end of copied text >    ASSESSMENT/PLAN:     23 year old male with PMH of Anemia (baseline Hb 9, never had transfusion), Sickle cell trait, learning disability, ADHD, HLD, KIMMIE presents to ED for anemia. Admitted for symptomatic anemia requiring transfusion.    #Symptomatic BETTY Acute on Chronic  #Recent H/o Hemoptysis attributed to Bronchitis  #Sickle Cell Trait  #Thrombocytosis  - hb 4.8 on admission (baseline 9.1 in 01/2024), MCV 51.8, Platelet 636  - Retic 2.2,   - Iron 15, % Sat 4  - EKG sinus tach  - s/p 3 units RBCs   - maintain active T&S  - monitor H&H  - low B12 & folate  - started on B12, Folate Supplementation  - f/u transglutaminase Ab IgG/IgA    #Intermittent Dysphagia w/ Globus Sensation  - SLP cleared for regular diet  - GI consulted  - s/p EGD (4/11): Erosive esophagitis & gastritis  - f/u pathology  - switched to Protonix 40 mg PO BID to complete 8 week course from 04/10  - continue IV Venofer to complete 5-day course from 04/10  - f/u heme consult  - f/u methylmalonic acid     #HLD  - Lipid panel: , TG 67, HDL 39, LDL 53  - A1c 5.1    #Depression  - continue Fluoxetine 10mg qd                                           --------------------------------------------------------------    # DVT prophylaxis: Lovenox  # GI prophylaxis: PPI  # Diet:   # Activity:   # Code status: Full Code  # Disposition:    Pending:

## 2024-04-11 NOTE — CONSULT NOTE ADULT - ASSESSMENT
IMPRESSION:    KIMMIE not on CPAP  Symptomatic anemia  Sickle Cell Trait   HO Elongated uvula- following with ENT     PLAN:  Moderate risk patient for low risk procedure   BiPAP prn post procedure and nightly- patient has been using nightly while admitted   Obtain records for previous sleep study  OP Follow up with Dr. Marcus Justice  Will need CPAP outpatient      IMPRESSION:    KIMMIE not on CPAP  Symptomatic anemia  Sickle Cell Trait   HO Elongated uvula- following with ENT     PLAN:    Moderate risk patient for low risk procedure   BiPAP prn post procedure and nightly- patient has been using nightly while admitted   Obtain records for previous sleep study  OP Follow up with Dr. Marcus Justice  Will need CPAP outpatient   Recall PRN     IMPRESSION:    KIMMIE not on CPAP  Symptomatic anemia  Sickle Cell Trait   HO Elongated uvula- following with ENT     PLAN:    Moderate risk patient for low risk procedure   BiPAP prn post procedure and nightly- patient has been using nightly while admitted   Obtain records for previous sleep study  OP Follow up with Dr. Marcus Justice  Will need CPAP titration vs repeat sleep study outpatient   Recall PRN

## 2024-04-11 NOTE — CONSULT NOTE ADULT - ATTENDING COMMENTS
Patient also seen and examined by myself. I agree with the house officer note above.  Situation discussed with him and the patient.  All questions answered. Patient also seen and examined by myself. I agree with the medical student's note above. Modifications made.  Situation discussed with him and the patient.  All questions answered.

## 2024-04-11 NOTE — CONSULT NOTE ADULT - CONSULT REASON
symptomatic anemia and sickle cell trait
Anemia
Pulmonary Risk Stratification for EGD in patient with KIMMIE

## 2024-04-12 LAB
ANION GAP SERPL CALC-SCNC: 11 MMOL/L — SIGNIFICANT CHANGE UP (ref 7–14)
APPEARANCE UR: CLEAR — SIGNIFICANT CHANGE UP
BILIRUB UR-MCNC: NEGATIVE — SIGNIFICANT CHANGE UP
BUN SERPL-MCNC: 13 MG/DL — SIGNIFICANT CHANGE UP (ref 10–20)
CALCIUM SERPL-MCNC: 9.3 MG/DL — SIGNIFICANT CHANGE UP (ref 8.4–10.5)
CHLORIDE SERPL-SCNC: 105 MMOL/L — SIGNIFICANT CHANGE UP (ref 98–110)
CO2 SERPL-SCNC: 26 MMOL/L — SIGNIFICANT CHANGE UP (ref 17–32)
COLOR SPEC: YELLOW — SIGNIFICANT CHANGE UP
CREAT SERPL-MCNC: 1 MG/DL — SIGNIFICANT CHANGE UP (ref 0.7–1.5)
DIFF PNL FLD: NEGATIVE — SIGNIFICANT CHANGE UP
EGFR: 109 ML/MIN/1.73M2 — SIGNIFICANT CHANGE UP
GLUCOSE SERPL-MCNC: 99 MG/DL — SIGNIFICANT CHANGE UP (ref 70–99)
GLUCOSE UR QL: NEGATIVE MG/DL — SIGNIFICANT CHANGE UP
HCT VFR BLD CALC: 26.6 % — LOW (ref 42–52)
HCT VFR BLD CALC: 27.8 % — LOW (ref 42–52)
HGB BLD-MCNC: 7.7 G/DL — LOW (ref 14–18)
HGB BLD-MCNC: 7.9 G/DL — LOW (ref 14–18)
HGB S BLD QL: POSITIVE
KETONES UR-MCNC: NEGATIVE MG/DL — SIGNIFICANT CHANGE UP
LEUKOCYTE ESTERASE UR-ACNC: NEGATIVE — SIGNIFICANT CHANGE UP
MAGNESIUM SERPL-MCNC: 2.1 MG/DL — SIGNIFICANT CHANGE UP (ref 1.8–2.4)
MCHC RBC-ENTMCNC: 17.6 PG — LOW (ref 27–31)
MCHC RBC-ENTMCNC: 17.7 PG — LOW (ref 27–31)
MCHC RBC-ENTMCNC: 28.4 G/DL — LOW (ref 32–37)
MCHC RBC-ENTMCNC: 28.9 G/DL — LOW (ref 32–37)
MCV RBC AUTO: 61 FL — LOW (ref 80–94)
MCV RBC AUTO: 61.9 FL — LOW (ref 80–94)
NITRITE UR-MCNC: NEGATIVE — SIGNIFICANT CHANGE UP
NRBC # BLD: 1 /100 WBCS — HIGH (ref 0–0)
NRBC # BLD: 2 /100 WBCS — HIGH (ref 0–0)
PH UR: 7.5 — SIGNIFICANT CHANGE UP (ref 5–8)
PLATELET # BLD AUTO: 562 K/UL — HIGH (ref 130–400)
PLATELET # BLD AUTO: 577 K/UL — HIGH (ref 130–400)
PMV BLD: 9 FL — SIGNIFICANT CHANGE UP (ref 7.4–10.4)
PMV BLD: SIGNIFICANT CHANGE UP (ref 7.4–10.4)
POTASSIUM SERPL-MCNC: 4.3 MMOL/L — SIGNIFICANT CHANGE UP (ref 3.5–5)
POTASSIUM SERPL-SCNC: 4.3 MMOL/L — SIGNIFICANT CHANGE UP (ref 3.5–5)
PROT UR-MCNC: NEGATIVE MG/DL — SIGNIFICANT CHANGE UP
RBC # BLD: 4.36 M/UL — LOW (ref 4.7–6.1)
RBC # BLD: 4.49 M/UL — LOW (ref 4.7–6.1)
RBC # FLD: 35.1 % — HIGH (ref 11.5–14.5)
RBC # FLD: 35.5 % — HIGH (ref 11.5–14.5)
SODIUM SERPL-SCNC: 142 MMOL/L — SIGNIFICANT CHANGE UP (ref 135–146)
SOLUBILITY: POSITIVE
SP GR SPEC: 1.01 — SIGNIFICANT CHANGE UP (ref 1–1.03)
UROBILINOGEN FLD QL: 0.2 MG/DL — SIGNIFICANT CHANGE UP (ref 0.2–1)
WBC # BLD: 8.33 K/UL — SIGNIFICANT CHANGE UP (ref 4.8–10.8)
WBC # BLD: 9.06 K/UL — SIGNIFICANT CHANGE UP (ref 4.8–10.8)
WBC # FLD AUTO: 8.33 K/UL — SIGNIFICANT CHANGE UP (ref 4.8–10.8)
WBC # FLD AUTO: 9.06 K/UL — SIGNIFICANT CHANGE UP (ref 4.8–10.8)

## 2024-04-12 PROCEDURE — 99233 SBSQ HOSP IP/OBS HIGH 50: CPT

## 2024-04-12 PROCEDURE — 99222 1ST HOSP IP/OBS MODERATE 55: CPT

## 2024-04-12 PROCEDURE — 99232 SBSQ HOSP IP/OBS MODERATE 35: CPT

## 2024-04-12 RX ORDER — IRON SUCROSE 20 MG/ML
200 INJECTION, SOLUTION INTRAVENOUS EVERY 24 HOURS
Refills: 0 | Status: DISCONTINUED | OUTPATIENT
Start: 2024-04-12 | End: 2024-04-13

## 2024-04-12 RX ADMIN — PANTOPRAZOLE SODIUM 40 MILLIGRAM(S): 20 TABLET, DELAYED RELEASE ORAL at 05:04

## 2024-04-12 RX ADMIN — Medication 20 MILLIGRAM(S): at 12:05

## 2024-04-12 RX ADMIN — Medication 1 MILLIGRAM(S): at 12:05

## 2024-04-12 RX ADMIN — PANTOPRAZOLE SODIUM 40 MILLIGRAM(S): 20 TABLET, DELAYED RELEASE ORAL at 17:25

## 2024-04-12 RX ADMIN — IRON SUCROSE 220 MILLIGRAM(S): 20 INJECTION, SOLUTION INTRAVENOUS at 16:46

## 2024-04-12 RX ADMIN — PREGABALIN 1000 MICROGRAM(S): 225 CAPSULE ORAL at 12:05

## 2024-04-12 NOTE — PROGRESS NOTE ADULT - SUBJECTIVE AND OBJECTIVE BOX
24H events:    Patient is a 23y old Male who presents with a chief complaint of Symptomatic Anemia (12 Apr 2024 09:25)    Primary diagnosis of Anemia        Today is hospital day 3d. This morning patient was seen and examined at bedside, resting comfortably in bed.    No acute or major events overnight.    Code Status:    Family communication:  Contact date:  Name of person contacted:  Relationship to patient:  Communication details:  What matters most:    PAST MEDICAL & SURGICAL HISTORY  ADHD    Learning disability    HLD (hyperlipidemia)    Sickle cell trait    Anemia    No significant past surgical history      SOCIAL HISTORY:  Social History:      ALLERGIES:  No Known Allergies    MEDICATIONS:  STANDING MEDICATIONS  cyanocobalamin 1000 MICROGram(s) Oral daily  cyanocobalamin 1000 MICROGram(s) Oral once  FLUoxetine 20 milliGRAM(s) Oral daily  folic acid 1 milliGRAM(s) Oral daily  folic acid 1 milliGRAM(s) Oral once  iron sucrose IVPB 100 milliGRAM(s) IV Intermittent every 24 hours  pantoprazole    Tablet 40 milliGRAM(s) Oral two times a day    PRN MEDICATIONS  acetaminophen     Tablet .. 650 milliGRAM(s) Oral every 6 hours PRN  aluminum hydroxide/magnesium hydroxide/simethicone Suspension 30 milliLiter(s) Oral every 4 hours PRN  melatonin 3 milliGRAM(s) Oral at bedtime PRN  ondansetron Injectable 4 milliGRAM(s) IV Push every 8 hours PRN    VITALS:   T(F): 97.8  HR: 119  BP: 119/60  RR: 18  SpO2: 98%    PHYSICAL EXAM:  GENERAL:   ( x ) NAD, lying in bed comfortably     (  ) obtunded     (  ) lethargic     (  ) somnolent    HEAD:   ( x ) Atraumatic     (  ) hematoma     (  ) laceration (specify location:       )     NECK:  ( x ) Supple     (  ) neck stiffness     (  ) nuchal rigidity     (  )  no JVD     (  ) JVD present ( -- cm)    HEART:  Rate -->     (  ) normal rate     (  ) bradycardic     (  ) tachycardic  Rhythm -->     ( x ) regular     (  ) regularly irregular     (  ) irregularly irregular  Murmurs -->     (  ) normal s1s2     (  ) systolic murmur     (  ) diastolic murmur     (  ) continuous murmur      (  ) S3 present     (  ) S4 present    LUNGS:   (x  )Unlabored respirations     (  ) tachypnea  ( x ) B/L air entry     (  ) decreased breath sounds in:  (location     )    (  ) no adventitious sound     (  ) crackles     (  ) wheezing      (  ) rhonchi      (specify location:       )  (  ) chest wall tenderness (specify location:       )    ABDOMEN:   ( x ) Soft     (  ) tense   |   (  x) nondistended     (  ) distended   |   (  ) +BS     (  ) hypoactive bowel sounds     (  ) hyperactive bowel sounds  ( x ) nontender     (  ) RUQ tenderness     (  ) RLQ tenderness     (  ) LLQ tenderness     (  ) epigastric tenderness     (  ) diffuse tenderness  (  ) Splenomegaly      (  ) Hepatomegaly      (  ) Jaundice     (  ) ecchymosis     EXTREMITIES:  (  ) Normal     (  ) Rash     (  ) ecchymosis     (  ) varicose veins      (  ) pitting edema     (  ) non-pitting edema   (  ) ulceration     (  ) gangrene:     (location:     )    NERVOUS SYSTEM:    (  ) A&Ox3     (  ) confused     (  ) lethargic  CN II-XII:     (  ) Intact     (  ) deficits found     (Specify:     )   Upper extremities:     (  ) no sensorimotor deficits     (  ) weakness     (  ) loss of proprioception/vibration     (  ) loss of touch/temperature (specify:    )  Lower extremities:     (  ) no sensorimotor deficits     (  ) weakness     (  ) loss of proprioception/vibration     (  ) loss of touch/temperature (specify:    )    SKIN:   (  ) No rashes or lesions     (  ) maculopapular rash     (  ) pustules     (  ) vesicles     (  ) ulcer     (  ) ecchymosis     (specify location:     )    AMPAC score:    (  ) Indwelling Awan Catheter:   Date insterted:    Reason (  ) Critical illness     (  ) urinary retention    (  ) Accurate Ins/Outs Monitoring     (  ) CMO patient    (  ) Central Line:   Date inserted:  Location: (  ) Right IJ     (  ) Left IJ     (  ) Right Fem     (  ) Left Fem    (  ) SPC        (  ) pigtail       (  ) PEG tube       (  ) colostomy       (  ) jejunostomy  (  ) U-Dall    LABS:                        7.9    8.33  )-----------( 562      ( 12 Apr 2024 05:38 )             27.8     04-12    142  |  105  |  13  ----------------------------<  99  4.3   |  26  |  1.0    Ca    9.3      12 Apr 2024 05:38  Mg     2.1     04-12        Urinalysis Basic - ( 12 Apr 2024 05:38 )    Color: x / Appearance: x / SG: x / pH: x  Gluc: 99 mg/dL / Ketone: x  / Bili: x / Urobili: x   Blood: x / Protein: x / Nitrite: x   Leuk Esterase: x / RBC: x / WBC x   Sq Epi: x / Non Sq Epi: x / Bacteria: x                RADIOLOGY:          < from: Xray Chest 2 Views PA/Lat (04.08.24 @ 13:58) >  Impression:    No radiographic evidence of acute cardiopulmonary disease.        --- End of Report ---    < end of copied text >    ASSESSMENT/PLAN:     23 year old male with PMH of Anemia (baseline Hb 9, never had transfusion), Sickle cell trait, learning disability, ADHD, HLD, KIMMIE presents to ED for anemia. Admitted for symptomatic anemia requiring transfusion.    #Symptomatic BETTY Acute on Chronic/Mixed Microcytic & Macrocytic Anemia  #Recent H/o Hemoptysis attributed to Bronchitis  #Sickle Cell Trait  #Thrombocytosis  - hb 4.8 on admission (baseline 9.1 in 01/2024), MCV 51.8, Platelet 636  - Retic 2.2,   - Iron 15, % Sat 4  - EKG sinus tach  - s/p 3 units RBCs   - maintain active T&S  - monitor H&H  - low B12 & folate  - continue folate   - continue IM B12 supplementation  - Heme consulted  - transglutaminase Ab IgG/IgA (-)    #Intermittent Dysphagia w/ Globus Sensation  - SLP cleared for regular diet  - GI consulted  - s/p EGD (4/11): Erosive esophagitis & gastritis  - f/u pathology  - switched to Protonix 40 mg PO BID to complete 8 week course from 04/10  - continue IV Venofer to complete 5-day course from 04/10  - f/u heme consult  - f/u methylmalonic acid     #HLD  - Lipid panel: , TG 67, HDL 39, LDL 53  - A1c 5.1    #Depression  - continue Fluoxetine 10mg qd                                           --------------------------------------------------------------    # DVT prophylaxis: Lovenox  # GI prophylaxis: PPI  # Diet:   # Activity:   # Code status: Full Code  # Disposition:    Pending:    24H events:    Patient is a 23y old Male who presents with a chief complaint of Symptomatic Anemia (12 Apr 2024 09:25)    Primary diagnosis of Anemia        Today is hospital day 3d. This morning patient was seen and examined at bedside, resting comfortably in bed.    No acute or major events overnight.    Code Status:    Family communication:  Contact date:  Name of person contacted:  Relationship to patient:  Communication details:  What matters most:    PAST MEDICAL & SURGICAL HISTORY  ADHD    Learning disability    HLD (hyperlipidemia)    Sickle cell trait    Anemia    No significant past surgical history      SOCIAL HISTORY:  Social History:      ALLERGIES:  No Known Allergies    MEDICATIONS:  STANDING MEDICATIONS  cyanocobalamin 1000 MICROGram(s) Oral daily  cyanocobalamin 1000 MICROGram(s) Oral once  FLUoxetine 20 milliGRAM(s) Oral daily  folic acid 1 milliGRAM(s) Oral daily  folic acid 1 milliGRAM(s) Oral once  iron sucrose IVPB 100 milliGRAM(s) IV Intermittent every 24 hours  pantoprazole    Tablet 40 milliGRAM(s) Oral two times a day    PRN MEDICATIONS  acetaminophen     Tablet .. 650 milliGRAM(s) Oral every 6 hours PRN  aluminum hydroxide/magnesium hydroxide/simethicone Suspension 30 milliLiter(s) Oral every 4 hours PRN  melatonin 3 milliGRAM(s) Oral at bedtime PRN  ondansetron Injectable 4 milliGRAM(s) IV Push every 8 hours PRN    VITALS:   T(F): 97.8  HR: 119  BP: 119/60  RR: 18  SpO2: 98%    PHYSICAL EXAM:  GENERAL:   ( x ) NAD, lying in bed comfortably     (  ) obtunded     (  ) lethargic     (  ) somnolent    HEAD:   ( x ) Atraumatic     (  ) hematoma     (  ) laceration (specify location:       )     NECK:  ( x ) Supple     (  ) neck stiffness     (  ) nuchal rigidity     (  )  no JVD     (  ) JVD present ( -- cm)    HEART:  Rate -->     (  ) normal rate     (  ) bradycardic     (  ) tachycardic  Rhythm -->     ( x ) regular     (  ) regularly irregular     (  ) irregularly irregular  Murmurs -->     (  ) normal s1s2     (  ) systolic murmur     (  ) diastolic murmur     (  ) continuous murmur      (  ) S3 present     (  ) S4 present    LUNGS:   (x  )Unlabored respirations     (  ) tachypnea  ( x ) B/L air entry     (  ) decreased breath sounds in:  (location     )    (  ) no adventitious sound     (  ) crackles     (  ) wheezing      (  ) rhonchi      (specify location:       )  (  ) chest wall tenderness (specify location:       )    ABDOMEN:   ( x ) Soft     (  ) tense   |   (  x) nondistended     (  ) distended   |   (  ) +BS     (  ) hypoactive bowel sounds     (  ) hyperactive bowel sounds  ( x ) nontender     (  ) RUQ tenderness     (  ) RLQ tenderness     (  ) LLQ tenderness     (  ) epigastric tenderness     (  ) diffuse tenderness  (  ) Splenomegaly      (  ) Hepatomegaly      (  ) Jaundice     (  ) ecchymosis     EXTREMITIES:  (  ) Normal     (  ) Rash     (  ) ecchymosis     (  ) varicose veins      (  ) pitting edema     (  ) non-pitting edema   (  ) ulceration     (  ) gangrene:     (location:     )    NERVOUS SYSTEM:    (  ) A&Ox3     (  ) confused     (  ) lethargic  CN II-XII:     (  ) Intact     (  ) deficits found     (Specify:     )   Upper extremities:     (  ) no sensorimotor deficits     (  ) weakness     (  ) loss of proprioception/vibration     (  ) loss of touch/temperature (specify:    )  Lower extremities:     (  ) no sensorimotor deficits     (  ) weakness     (  ) loss of proprioception/vibration     (  ) loss of touch/temperature (specify:    )    SKIN:   (  ) No rashes or lesions     (  ) maculopapular rash     (  ) pustules     (  ) vesicles     (  ) ulcer     (  ) ecchymosis     (specify location:     )    AMPAC score:    (  ) Indwelling Awan Catheter:   Date insterted:    Reason (  ) Critical illness     (  ) urinary retention    (  ) Accurate Ins/Outs Monitoring     (  ) CMO patient    (  ) Central Line:   Date inserted:  Location: (  ) Right IJ     (  ) Left IJ     (  ) Right Fem     (  ) Left Fem    (  ) SPC        (  ) pigtail       (  ) PEG tube       (  ) colostomy       (  ) jejunostomy  (  ) U-Dall    LABS:                        7.9    8.33  )-----------( 562      ( 12 Apr 2024 05:38 )             27.8     04-12    142  |  105  |  13  ----------------------------<  99  4.3   |  26  |  1.0    Ca    9.3      12 Apr 2024 05:38  Mg     2.1     04-12        Urinalysis Basic - ( 12 Apr 2024 05:38 )    Color: x / Appearance: x / SG: x / pH: x  Gluc: 99 mg/dL / Ketone: x  / Bili: x / Urobili: x   Blood: x / Protein: x / Nitrite: x   Leuk Esterase: x / RBC: x / WBC x   Sq Epi: x / Non Sq Epi: x / Bacteria: x                RADIOLOGY:          < from: Xray Chest 2 Views PA/Lat (04.08.24 @ 13:58) >  Impression:    No radiographic evidence of acute cardiopulmonary disease.        --- End of Report ---    < end of copied text >    ASSESSMENT/PLAN:     23 year old male with PMH of Anemia (baseline Hb 9, never had transfusion), Sickle cell trait, learning disability, ADHD, HLD, KIMMIE presents to ED for anemia. Admitted for symptomatic anemia requiring transfusion.    #Symptomatic BETTY Acute on Chronic/Mixed Microcytic & Macrocytic Anemia  #Recent H/o Hemoptysis attributed to Bronchitis  #Sickle Cell Trait  #Thrombocytosis  - hb 4.8 on admission (baseline 9.1 in 01/2024), MCV 51.8, Platelet 636  - Retic 2.2,   - Iron 15, % Sat 4  - EKG sinus tach  - s/p 3 units RBCs   - maintain active T&S  - monitor H&H  - low B12 & folate  - s/p EGD (4/11): erosive esophagitis & gastritis  - continue folate   - continue IM B12 supplementation  - Heme consulted  - transglutaminase Ab IgA (-), IgG slightly elevated  - increased Venofer to 20mg BID    - f/u IgA levels, UA, Methylmalonic acid, Homocysteine    #Intermittent Dysphagia w/ Globus Sensation  - SLP cleared for regular diet  - GI consulted  - s/p EGD (4/11): Erosive esophagitis & gastritis  - f/u pathology  - switched to Protonix 40 mg PO BID to complete 8 week course from 04/10  - continue IV Venofer to complete 5-day course from 04/10      #HLD  - Lipid panel: , TG 67, HDL 39, LDL 53  - A1c 5.1    #Depression  - continue Fluoxetine 10mg qd                                           --------------------------------------------------------------    # DVT prophylaxis: Lovenox  # GI prophylaxis: PPI  # Diet:   # Activity:   # Code status: Full Code  # Disposition:    Pending:

## 2024-04-12 NOTE — PROGRESS NOTE ADULT - SUBJECTIVE AND OBJECTIVE BOX
MARIA VICTORIABEVERLYOR  23y  [facility] [address]      Patient is a 23y old  Male who presents with a chief complaint of Symptomatic Anemia (11 Apr 2024 10:24)      INTERVAL HPI/OVERNIGHT EVENTS:        REVIEW OF SYSTEMS:        FAMILY HISTORY:    T(C): 36.6 (04-12-24 @ 05:08), Max: 36.9 (04-11-24 @ 12:41)  HR: 119 (04-12-24 @ 05:08) (96 - 119)  BP: 119/60 (04-12-24 @ 05:08) (106/60 - 139/64)  RR: 18 (04-12-24 @ 05:08) (18 - 20)  SpO2: 98% (04-12-24 @ 05:08) (95% - 100%)  Wt(kg): --Vital Signs Last 24 Hrs  T(C): 36.6 (12 Apr 2024 05:08), Max: 36.9 (11 Apr 2024 12:41)  T(F): 97.8 (12 Apr 2024 05:08), Max: 98.4 (11 Apr 2024 12:41)  HR: 119 (12 Apr 2024 05:08) (96 - 119)  BP: 119/60 (12 Apr 2024 05:08) (106/60 - 139/64)  BP(mean): 83 (12 Apr 2024 05:08) (83 - 94)  RR: 18 (12 Apr 2024 05:08) (18 - 20)  SpO2: 98% (12 Apr 2024 05:08) (95% - 100%)    Parameters below as of 12 Apr 2024 05:08  Patient On (Oxygen Delivery Method): room air        PHYSICAL EXAM:  GENERAL: NAD, well-groomed, well-developed  HEAD:  Atraumatic, Normocephalic  EYES: EOMI, PERRLA, conjunctiva and sclera clear  ENMT: No tonsillar erythema, exudates, or enlargement; Moist mucous membranes, Good dentition, No lesions  NECK: Supple, No JVD, Normal thyroid  NERVOUS SYSTEM:  Alert & Oriented X3, Good concentration; Motor Strength 5/5 B/L upper and lower extremities; DTRs 2+ intact and symmetric  PULM: Clear to auscultation bilaterally  CARDIAC: Regular rate and rhythm; No murmurs, rubs, or gallops  GI: Soft, Nontender, Nondistended; Bowel sounds present  EXTREMITIES:  2+ Peripheral Pulses, No clubbing, cyanosis, or edema  LYMPH: No lymphadenopathy noted  SKIN: No rashes or lesions    Consultant(s) Notes Reviewed:  [x ] YES  [ ] NO  Care Discussed with Consultants/Other Providers [ x] YES  [ ] NO    LABS:                            7.9    8.33  )-----------( 562      ( 12 Apr 2024 05:38 )             27.8   04-12    142  |  105  |  13  ----------------------------<  99  4.3   |  26  |  1.0    Ca    9.3      12 Apr 2024 05:38  Mg     2.1     04-12              acetaminophen     Tablet .. 650 milliGRAM(s) Oral every 6 hours PRN  aluminum hydroxide/magnesium hydroxide/simethicone Suspension 30 milliLiter(s) Oral every 4 hours PRN  cyanocobalamin 1000 MICROGram(s) Oral daily  cyanocobalamin 1000 MICROGram(s) Oral once  FLUoxetine 20 milliGRAM(s) Oral daily  folic acid 1 milliGRAM(s) Oral daily  folic acid 1 milliGRAM(s) Oral once  iron sucrose IVPB 100 milliGRAM(s) IV Intermittent every 24 hours  melatonin 3 milliGRAM(s) Oral at bedtime PRN  ondansetron Injectable 4 milliGRAM(s) IV Push every 8 hours PRN  pantoprazole    Tablet 40 milliGRAM(s) Oral two times a day        23 year old male with PMH of Anemia (baseline Hb 9, never had transfusion), Sickle cell trait, learning disability, ADHD, HLD, KIMMIE presents to ED for anemia. Admitted for symptomatic anemia requiring transfusion.    1. Symptomatic iron deficiency anemia complicated by thrombocytosis and dysphagia s/p 3 PRBC  . hx of sickle cell trait   * Recent H/o Hemoptysis attributed to Bronchitis  - Admit to medicine        - ECG: sinus tachycardia              - hb 4.8 on admission (baseline 9.1 in 01/2024), MCV 51.8, Platelet 636  - Cont PPI IV bid   - s/p 3 units RBCs   - low B12 & folate  - started on B12, Folate Supplementation          -  methylmalonic acid :pending   - s/p EGD (4/11): Erosive esophagitis & gastritis  - SLP cleared for regular diet  - continue IV Venofer to complete 5-day course from 04/10  - f/u transglutaminase Ab IgG/IgA  - GI consulted  - f/u heme consult    2. HLD  - Lipid panel: , TG 67, HDL 39, LDL 53  - A1c 5.1    3. Depression  - continue Fluoxetine 10mg qd                                           --------------------------------------------------------------    # DVT prophylaxis: Lovenox  # GI prophylaxis: PPI  # Diet:   # Activity:   # Code status: Full Code  # Disposition:     MARIA VICTORIAEDDY  23y  [facility] [address]      Patient is a 23y old  Male who presents with a chief complaint of Symptomatic Anemia (11 Apr 2024 10:24)      INTERVAL HPI/OVERNIGHT EVENTS:    Patient feels better  he reported improvement in dysphagia.   no overnight events  denies hematuria         FAMILY HISTORY:    T(C): 36.6 (04-12-24 @ 05:08), Max: 36.9 (04-11-24 @ 12:41)  HR: 119 (04-12-24 @ 05:08) (96 - 119)  BP: 119/60 (04-12-24 @ 05:08) (106/60 - 139/64)  RR: 18 (04-12-24 @ 05:08) (18 - 20)  SpO2: 98% (04-12-24 @ 05:08) (95% - 100%)  Wt(kg): --Vital Signs Last 24 Hrs  T(C): 36.6 (12 Apr 2024 05:08), Max: 36.9 (11 Apr 2024 12:41)  T(F): 97.8 (12 Apr 2024 05:08), Max: 98.4 (11 Apr 2024 12:41)  HR: 119 (12 Apr 2024 05:08) (96 - 119)  BP: 119/60 (12 Apr 2024 05:08) (106/60 - 139/64)  BP(mean): 83 (12 Apr 2024 05:08) (83 - 94)  RR: 18 (12 Apr 2024 05:08) (18 - 20)  SpO2: 98% (12 Apr 2024 05:08) (95% - 100%)    Parameters below as of 12 Apr 2024 05:08  Patient On (Oxygen Delivery Method): room air        PHYSICAL EXAM:  GENERAL: NAD, well-groomed, well-developed  NERVOUS SYSTEM:  Alert & Oriented X3  PULM: Clear to auscultation bilaterally  CARDIAC: Regular rate and rhythm;  GI: Soft, Nontender, Nondistended; Bowel sounds present  EXTREMITIES:  2+ Peripheral Pulses,     Consultant(s) Notes Reviewed:  [x ] YES  [ ] NO  Care Discussed with Consultants/Other Providers [ x] YES  [ ] NO    LABS:                            7.9    8.33  )-----------( 562      ( 12 Apr 2024 05:38 )             27.8   04-12    142  |  105  |  13  ----------------------------<  99  4.3   |  26  |  1.0    Ca    9.3      12 Apr 2024 05:38  Mg     2.1     04-12              acetaminophen     Tablet .. 650 milliGRAM(s) Oral every 6 hours PRN  aluminum hydroxide/magnesium hydroxide/simethicone Suspension 30 milliLiter(s) Oral every 4 hours PRN  cyanocobalamin 1000 MICROGram(s) Oral daily  cyanocobalamin 1000 MICROGram(s) Oral once  FLUoxetine 20 milliGRAM(s) Oral daily  folic acid 1 milliGRAM(s) Oral daily  folic acid 1 milliGRAM(s) Oral once  iron sucrose IVPB 100 milliGRAM(s) IV Intermittent every 24 hours  melatonin 3 milliGRAM(s) Oral at bedtime PRN  ondansetron Injectable 4 milliGRAM(s) IV Push every 8 hours PRN  pantoprazole    Tablet 40 milliGRAM(s) Oral two times a day        23 year old male with PMH of Anemia (baseline Hb 9, never had transfusion), Sickle cell trait, learning disability, ADHD, HLD, KIMMIE presents to ED for anemia. Admitted for symptomatic anemia requiring transfusion.    1. Symptomatic iron deficiency anemia complicated by thrombocytosis and dysphagia s/p 3 PRBC  . hx of sickle cell trait   * Recent H/o Hemoptysis attributed to Bronchitis  - Admit to medicine        - ECG: sinus tachycardia              - hb 4.8 on admission (baseline 9.1 in 01/2024), MCV 51.8, Platelet 636  - Urine analysis:pending   - Cont PPI IV bid   - s/p 3 units RBCs   - started on B12, Folate Supplementation          -  Methylmalonic acid :pending   - s/p EGD (4/11): Erosive esophagitis & gastritis  - Colono:pending  - SLP cleared for regular diet  - continue IV Venofer to complete 5-day course from 04/10  - Transglutaminase Ab IgG slightly elevated. IgA TGG: negative          -Ideally, we should also check IgA level to rule out IgA def before checking specific Ig A abs  * pt has no wt loss, doubt malabsorption   - GI consulted  - heme consult:  a) obtain information regarding outpatient heme doctor from family member  b)  if GI work up (EGD, colonoscopy, capsule endoscopy) negative for any bleeding source, consider malabsorption studies.    2. HLD  - Lipid panel: , TG 67, HDL 39, LDL 53  - A1c 5.1    3. Depression  - continue Fluoxetine 10mg qd                                           --------------------------------------------------------------    # DVT prophylaxis: Lovenox  # GI prophylaxis: PPI  # Diet:   # Activity:   # Code status: Full Code  # Disposition:

## 2024-04-12 NOTE — PROGRESS NOTE ADULT - SUBJECTIVE AND OBJECTIVE BOX
Gastroenterology Follow Up Note      Location: [facility] 3E 007 A ([facility] 3E)  Patient Name: EDDY IRENE  Age: 23y  Gender: Male      Chief Complaint  Patient is a 23y old Male who presents with a chief complaint of Symptomatic Anemia (12 Apr 2024 10:07)  Primary diagnosis of Anemia      Reason for Consult  Anemia  Dysphagia      Progress Note  This morning patient was seen and examined at bedside.    Today is hospital day 3d.  Patient is doing fine. No acute events overnight.   He still complains of dysphagia to solids.  He denies nausea or vomiting.   He denies any diarrhea, constipation, melena, hematochezia, or hematemesis.  Last bowel movement was soft and was on 04/11.      Vital Signs in the last 24 hours   Vitals Summary T(C): 36.6 (04-12-24 @ 05:08), Max: 36.9 (04-11-24 @ 12:41)  HR: 119 (04-12-24 @ 05:08) (96 - 119)  BP: 119/60 (04-12-24 @ 05:08) (106/60 - 139/64)  RR: 18 (04-12-24 @ 05:08) (18 - 20)  SpO2: 98% (04-12-24 @ 05:08) (95% - 98%)  Vent Data   Intake/ Output   Measurements       Physical Exam  * General Appearance: Alert, cooperative, interactive, oriented to time, place, and person, in no acute distress  * Eyes: PERRL, conjunctiva/corneas clear, EOM's intact, fundi benign, both eyes   * Lungs: Good bilateral air entry, normal breath sound  * Heart: Regular Rate and Rhythm, normal S1 and S2, no audible murmur, rub, or gallop  * Abdomen: Symmetric, non-distended, soft, non-tender, bowel sounds active all four quadrants  * Rectal: empty vault, Normal tone, no palpable masses or tenderness      Investigations   Laboratory Workup      - CBC:                        7.9    8.33  )-----------( 562      ( 12 Apr 2024 05:38 )             27.8       - Hgb Trend:  7.9  04-12-24 @ 05:38  7.7  04-12-24 @ 00:44  8.3  04-11-24 @ 17:09  6.7  04-11-24 @ 06:45  7.2  04-10-24 @ 09:04  7.1  04-10-24 @ 00:15  4.8  04-09-24 @ 16:50        - Chemistry:  04-12    142  |  105  |  13  ----------------------------<  99  4.3   |  26  |  1.0    Ca    9.3      12 Apr 2024 05:38  Mg     2.1     04-12      Liver panel trend:  TBili 0.6   /   AST 11   /   ALT 8   /   AlkP 69   /   Tptn 6.5   /   Alb 4.1    /   DBili --      04-10  TBili 0.4   /   AST 10   /   ALT 8   /   AlkP 61   /   Tptn 6.4   /   Alb 4.0    /   DBili --      04-09      - Coagulation Studies:      - ABG:      - Cardiac Markers:        Microbiological Workup  Urinalysis Basic - ( 12 Apr 2024 05:38 )    Color: x / Appearance: x / SG: x / pH: x  Gluc: 99 mg/dL / Ketone: x  / Bili: x / Urobili: x   Blood: x / Protein: x / Nitrite: x   Leuk Esterase: x / RBC: x / WBC x   Sq Epi: x / Non Sq Epi: x / Bacteria: x          Radiological Workup            Current Medications  Standing Medications  cyanocobalamin 1000 MICROGram(s) Oral once  cyanocobalamin 1000 MICROGram(s) Oral daily  FLUoxetine 20 milliGRAM(s) Oral daily  folic acid 1 milliGRAM(s) Oral daily  folic acid 1 milliGRAM(s) Oral once  iron sucrose IVPB 100 milliGRAM(s) IV Intermittent every 24 hours  pantoprazole    Tablet 40 milliGRAM(s) Oral two times a day    PRN Medications  acetaminophen     Tablet .. 650 milliGRAM(s) Oral every 6 hours PRN Temp greater or equal to 38C (100.4F), Mild Pain (1 - 3)  aluminum hydroxide/magnesium hydroxide/simethicone Suspension 30 milliLiter(s) Oral every 4 hours PRN Dyspepsia  melatonin 3 milliGRAM(s) Oral at bedtime PRN Insomnia  ondansetron Injectable 4 milliGRAM(s) IV Push every 8 hours PRN Nausea and/or Vomiting    Singles Doses Administered  (ADM OVERRIDE) 1 each &lt;see task&gt; GiveOnce  (ADM OVERRIDE) 1 each &lt;see task&gt; GiveOnce  (ADM OVERRIDE) 3 each &lt;see task&gt; GiveOnce  cyanocobalamin Injectable 1000 MICROGram(s) IntraMuscular once

## 2024-04-12 NOTE — PROGRESS NOTE ADULT - ASSESSMENT
23M w/ PMHx h/o Anemia (baseline hgb of 9, never had transfusions), Sickle Cell Trait (not disease), Learning Disability, ADHD, HLD and KIMMIE presents the ED for anemia. Admitted to medicine for symptomatic anemia requiring transfusions.    #Symptomatic Iron Deficiency Anemia, Acute on Chronic  #recent h/o Hemoptysis - attributed to Bronchitis  #Sickle Cell Trait  #Thrombocytosis  - Vitals: Temp 99.1F, /63, , RR 18, SpO2 98% on RA  - Hgb 4.8 (previously 9.1), MCV 51.8, Platelet 636  - Retic % 2.2,  wnl  - Total Iron 15, %sat 4  - EKG - Sinus Tach  - s/p 2u pRBCs with adequate correction  - monitor H&H daily  - transfuse PRN, keep Hgb >7  - maintain active T&S  - f/u b12, folate  - f/u Transglutaminase Ab IgG/IgA    #Intermittent Dysphagia w/ Globus Sensation and Regurg  - f/u GI consult  - f/u speech and swallow eval--> normal   - Heme c/s    #HLD  - f/u lipid panel, a1c    #Depression  - patient takes Fluoxetine  10mg qD     #DVT ppx: SCDs  #GI ppx: PPI IV BID  #Diet: normal diet  #Activity: IAT  #Code Status: Full Code  #Dispo: from home, acute     #Progress Note Handoff  Pending (specify):  GI c/s, Heme c/s, H/H monitoring  Family discussion: updated  Disposition: likely home in the next 24-48hrs
Assessment and Plan  Case of a 23 year old male patient known to have a history of ADHD, sickle cell trait, DL, and KIMMIE who was referred to the ED on 04/09 by his pulmonologist for an outpatient Hb of 5, found to be stable with a Hb of 4.8 s/p 2 units of pRBC. We are consulted for concern of acute on chronic anemia and dysphagia.      Acute on Chronic Severely Microcytic Anemia in Setting of History of Sickle Cell Trait   Suspected Celiac Disease in Setting of Positive Ig G Transglutaminase Serologies  Grade B Esophagitis with Esophageal Dysphagia and Globus Sensation  No Evidence of Overt Gastrointestinal Bleeding  * History of Sickle cell trait; he did not recall name of hematologist; never required transfusions  * Baseline Hb 9; never required transfusions  * Referred by pulmonologist for outpatient Hb 5  * GI History: notes dysphagia to solids not liquids for few days; associated with globus; denies abdominal pain, nausea, vomiting, weight loss, NSAID use, reflux symptoms; denies constipation, melena, hematochezia, hematemesis; baseline BMs: QOD with no straining    * Hemodynamically stable  * Stable CBC  * Iron with Total Binding Capacity (04.09.24 @ 16:50)  Iron - Total Binding Capacity.: 381 ug/dL; % Saturation, Iron: 4 %;Iron Total: 15 ug/dL; Unsaturated Iron Binding Capacity: 366 ug/dL  * Transglutaminase IgG Interpretation: Weak Positive  * Transglutaminase IgA Interpretation: Negative  * No prior EGD or Colonoscopy per patient; FHx stage 4 GB cancer in father per patient  * No prior abdominal imaging  * Family History: negative for GI malignancies  * Status post EGD on 04/11: Grade B esophagitis compatible with nonspecific erosive esophagitis. Erythema in the stomach compatible with non-erosive gastritis. (Biopsy). Hiatal Hernia. Normal Duodenum (bx)    RECOMMENDATIONS  - Await pathology. Celiac Transglutaminase IgG serologies weakly positive versus Transglutaminase IgA negative noted  - Continue PO protonix 40mg BID to complete an 8 week course  - Continue IV Venofer to complete 5-day course  - Advance diet as tolerated  - Recommend lifestyle modifications: avoiding NSAIDs and losing weight  - Trend Hb and keep active T/S. Transfuse as needed to keep Hb >7. Recommend hematology follow up in setting of history of sickle cell trait  - Monitor BM  - Avoid NSAIDs      Thank you for your consult.  - Please note that plan was communicated with medical team.   - Please reach GI on [phone] during weekdays till 5pm.  - Please call the GI service line after 5pm on Weekdays and anytime on Weekends: [phone].      Emmanuel Hamm MD  PGY - 4 Gastroenterology Fellow   [facility]

## 2024-04-12 NOTE — PROGRESS NOTE ADULT - ATTENDING COMMENTS
I edited the note
***My note supersedes any discrepancies that may be above in the resident's note***    23M w/ PMHx h/o Anemia (baseline hgb of 9, never had transfusions), Sickle Cell Trait (not disease), Learning Disability, ADHD, HLD and KIMMIE presents the ED for anemia. Admitted to medicine for symptomatic anemia requiring transfusions.    #Symptomatic Iron Deficiency Anemia, Acute on Chronic  #recent h/o Hemoptysis - attributed to Bronchitis  #Sickle Cell Trait  #Thrombocytosis  - Vitals: Temp 99.1F, /63, , RR 18, SpO2 98% on RA  - Hgb 4.8 (previously 9.1), MCV 51.8, Platelet 636  - Retic % 2.2,  wnl  - Total Iron 15, %sat 4  - EKG - Sinus Tach  - s/p 2u pRBCs with adequate correction but downtrended to 6.7 this AM.   - monitor H&H daily  - transfuse PRN, keep Hgb >7  - maintain active T&S  - f/u b12, folate  - f/u Transglutaminase Ab IgG/IgA  - Heme consult recs pending from attenign    #Intermittent Dysphagia w/ Globus Sensation and Regurg  - f/u GI consult; endoscopy today  - f/u speech and swallow eval--> normal   - Heme c/s    #HLD  - f/u lipid panel, a1c    #Depression  - patient takes Fluoxetine  10mg qD     #DVT ppx: SCDs  #GI ppx: PPI IV BID  #Diet: normal diet  #Activity: IAT  #Code Status: Full Code  #Dispo: from home, acute     #Progress Note Handoff  Pending (specify):  GI c/s, Heme c/s, H/H monitoring  Family discussion: updated  Disposition: unknown at this time

## 2024-04-13 ENCOUNTER — TRANSCRIPTION ENCOUNTER (OUTPATIENT)
Age: 24
End: 2024-04-13

## 2024-04-13 VITALS
OXYGEN SATURATION: 99 % | RESPIRATION RATE: 18 BRPM | SYSTOLIC BLOOD PRESSURE: 130 MMHG | HEART RATE: 93 BPM | TEMPERATURE: 98 F | DIASTOLIC BLOOD PRESSURE: 76 MMHG

## 2024-04-13 LAB
ANION GAP SERPL CALC-SCNC: 13 MMOL/L — SIGNIFICANT CHANGE UP (ref 7–14)
BUN SERPL-MCNC: 11 MG/DL — SIGNIFICANT CHANGE UP (ref 10–20)
CALCIUM SERPL-MCNC: 9.9 MG/DL — SIGNIFICANT CHANGE UP (ref 8.4–10.4)
CHLORIDE SERPL-SCNC: 104 MMOL/L — SIGNIFICANT CHANGE UP (ref 98–110)
CO2 SERPL-SCNC: 24 MMOL/L — SIGNIFICANT CHANGE UP (ref 17–32)
CREAT SERPL-MCNC: 1.1 MG/DL — SIGNIFICANT CHANGE UP (ref 0.7–1.5)
EGFR: 97 ML/MIN/1.73M2 — SIGNIFICANT CHANGE UP
GLUCOSE SERPL-MCNC: 96 MG/DL — SIGNIFICANT CHANGE UP (ref 70–99)
HCT VFR BLD CALC: 31.1 % — LOW (ref 42–52)
HEMOGLOBIN INTERPRETATION: SIGNIFICANT CHANGE UP
HGB A MFR BLD: 77.2 % — LOW (ref 95.8–98)
HGB A2 MFR BLD: 2.7 % — SIGNIFICANT CHANGE UP (ref 2–3.2)
HGB BLD-MCNC: 8.5 G/DL — LOW (ref 14–18)
HGB S MFR BLD: 20.1 % — HIGH
IGA FLD-MCNC: 155 MG/DL — SIGNIFICANT CHANGE UP (ref 84–499)
IGG FLD-MCNC: 781 MG/DL — SIGNIFICANT CHANGE UP (ref 610–1660)
IGM SERPL-MCNC: 93 MG/DL — SIGNIFICANT CHANGE UP (ref 35–242)
KAPPA LC SER QL IFE: 1.94 MG/DL — SIGNIFICANT CHANGE UP (ref 0.33–1.94)
KAPPA/LAMBDA FREE LIGHT CHAIN RATIO, SERUM: 1.29 RATIO — SIGNIFICANT CHANGE UP (ref 0.26–1.65)
LAMBDA LC SER QL IFE: 1.5 MG/DL — SIGNIFICANT CHANGE UP (ref 0.57–2.63)
MAGNESIUM SERPL-MCNC: 2.1 MG/DL — SIGNIFICANT CHANGE UP (ref 1.8–2.4)
MCHC RBC-ENTMCNC: 17.3 PG — LOW (ref 27–31)
MCHC RBC-ENTMCNC: 27.3 G/DL — LOW (ref 32–37)
MCV RBC AUTO: 63.3 FL — LOW (ref 80–94)
NRBC # BLD: 1 /100 WBCS — HIGH (ref 0–0)
PLATELET # BLD AUTO: 590 K/UL — HIGH (ref 130–400)
PMV BLD: 9.3 FL — SIGNIFICANT CHANGE UP (ref 7.4–10.4)
POTASSIUM SERPL-MCNC: 5 MMOL/L — SIGNIFICANT CHANGE UP (ref 3.5–5)
POTASSIUM SERPL-SCNC: 5 MMOL/L — SIGNIFICANT CHANGE UP (ref 3.5–5)
RBC # BLD: 4.91 M/UL — SIGNIFICANT CHANGE UP (ref 4.7–6.1)
RBC # FLD: 36.2 % — HIGH (ref 11.5–14.5)
SODIUM SERPL-SCNC: 141 MMOL/L — SIGNIFICANT CHANGE UP (ref 135–146)
SURGICAL PATHOLOGY STUDY: SIGNIFICANT CHANGE UP
WBC # BLD: 9.03 K/UL — SIGNIFICANT CHANGE UP (ref 4.8–10.8)
WBC # FLD AUTO: 9.03 K/UL — SIGNIFICANT CHANGE UP (ref 4.8–10.8)

## 2024-04-13 RX ORDER — PANTOPRAZOLE SODIUM 20 MG/1
1 TABLET, DELAYED RELEASE ORAL
Qty: 120 | Refills: 0
Start: 2024-04-13 | End: 2024-06-11

## 2024-04-13 RX ORDER — FERROUS SULFATE 325(65) MG
1 TABLET ORAL
Qty: 15 | Refills: 0
Start: 2024-04-13 | End: 2024-05-12

## 2024-04-13 RX ORDER — FOLIC ACID 0.8 MG
1 TABLET ORAL
Qty: 60 | Refills: 0
Start: 2024-04-13 | End: 2024-06-11

## 2024-04-13 RX ORDER — PREGABALIN 225 MG/1
1 CAPSULE ORAL
Qty: 30 | Refills: 0
Start: 2024-04-13 | End: 2024-05-12

## 2024-04-13 RX ADMIN — PANTOPRAZOLE SODIUM 40 MILLIGRAM(S): 20 TABLET, DELAYED RELEASE ORAL at 06:15

## 2024-04-13 RX ADMIN — Medication 20 MILLIGRAM(S): at 11:11

## 2024-04-13 RX ADMIN — Medication 1 MILLIGRAM(S): at 11:11

## 2024-04-13 RX ADMIN — PREGABALIN 1000 MICROGRAM(S): 225 CAPSULE ORAL at 11:11

## 2024-04-13 NOTE — DISCHARGE NOTE PROVIDER - CARE PROVIDER_API CALL
Yuri Gar  Internal Medicine  [address] [place] [address]  Phone: [phone]  Fax: [phone]  Follow Up Time:    Yuri Gar  Internal Medicine  [address] [place] [address]  Phone: [phone]  Fax: [phone]  Follow Up Time:     Henry Shah)  Hematology  [address], [place] [address]  Phone: [phone]  Fax: [phone]  Follow Up Time: 1 week

## 2024-04-13 NOTE — DISCHARGE NOTE NURSING/CASE MANAGEMENT/SOCIAL WORK - PATIENT PORTAL LINK FT
You can access the FollowMyHealth Patient Portal offered by [facility] by registering at the following website: [email or web address]. By joining [facility] FollowMyHealth portal, you will also be able to view your health information using other applications (apps) compatible with our system.

## 2024-04-13 NOTE — PROGRESS NOTE ADULT - SUBJECTIVE AND OBJECTIVE BOX
24H events:    Patient is a 23y old Male who presents with a chief complaint of Symptomatic Anemia (2024 08:56)    Primary diagnosis of Anemia        Today is hospital day 4d. This morning patient was seen and examined at bedside, resting comfortably in bed.    No acute or major events overnight.    Code Status:    Family communication:  Contact date:  Name of person contacted:  Relationship to patient:  Communication details:  What matters most:    PAST MEDICAL & SURGICAL HISTORY  ADHD    Learning disability    HLD (hyperlipidemia)    Sickle cell trait    Anemia    No significant past surgical history      SOCIAL HISTORY:  Social History:      ALLERGIES:  No Known Allergies    MEDICATIONS:  STANDING MEDICATIONS  cyanocobalamin 1000 MICROGram(s) Oral daily  cyanocobalamin 1000 MICROGram(s) Oral once  FLUoxetine 20 milliGRAM(s) Oral daily  folic acid 1 milliGRAM(s) Oral once  folic acid 1 milliGRAM(s) Oral daily  iron sucrose IVPB 200 milliGRAM(s) IV Intermittent every 24 hours  pantoprazole    Tablet 40 milliGRAM(s) Oral two times a day    PRN MEDICATIONS  acetaminophen     Tablet .. 650 milliGRAM(s) Oral every 6 hours PRN  aluminum hydroxide/magnesium hydroxide/simethicone Suspension 30 milliLiter(s) Oral every 4 hours PRN  melatonin 3 milliGRAM(s) Oral at bedtime PRN  ondansetron Injectable 4 milliGRAM(s) IV Push every 8 hours PRN    VITALS:   T(F): 97.5  HR: 93  BP: 130/76  RR: 18  SpO2: 99%    PHYSICAL EXAM:  GENERAL:   ( x ) NAD, lying in bed comfortably     (  ) obtunded     (  ) lethargic     (  ) somnolent    HEAD:   ( x ) Atraumatic     (  ) hematoma     (  ) laceration (specify location:       )     NECK:  ( x ) Supple     (  ) neck stiffness     (  ) nuchal rigidity     (  )  no JVD     (  ) JVD present ( -- cm)    HEART:  Rate -->     (  ) normal rate     (  ) bradycardic     (  ) tachycardic  Rhythm -->     ( x ) regular     (  ) regularly irregular     (  ) irregularly irregular  Murmurs -->     (  ) normal s1s2     (  ) systolic murmur     (  ) diastolic murmur     (  ) continuous murmur      (  ) S3 present     (  ) S4 present    LUNGS:   (x  )Unlabored respirations     (  ) tachypnea  ( x ) B/L air entry     (  ) decreased breath sounds in:  (location     )    (  ) no adventitious sound     (  ) crackles     (  ) wheezing      (  ) rhonchi      (specify location:       )  (  ) chest wall tenderness (specify location:       )    ABDOMEN:   ( x ) Soft     (  ) tense   |   (  x) nondistended     (  ) distended   |   (  ) +BS     (  ) hypoactive bowel sounds     (  ) hyperactive bowel sounds  ( x ) nontender     (  ) RUQ tenderness     (  ) RLQ tenderness     (  ) LLQ tenderness     (  ) epigastric tenderness     (  ) diffuse tenderness  (  ) Splenomegaly      (  ) Hepatomegaly      (  ) Jaundice     (  ) ecchymosis     EXTREMITIES:  (  ) Normal     (  ) Rash     (  ) ecchymosis     (  ) varicose veins      (  ) pitting edema     (  ) non-pitting edema   (  ) ulceration     (  ) gangrene:     (location:     )    NERVOUS SYSTEM:    (  ) A&Ox3     (  ) confused     (  ) lethargic  CN II-XII:     (  ) Intact     (  ) deficits found     (Specify:     )   Upper extremities:     (  ) no sensorimotor deficits     (  ) weakness     (  ) loss of proprioception/vibration     (  ) loss of touch/temperature (specify:    )  Lower extremities:     (  ) no sensorimotor deficits     (  ) weakness     (  ) loss of proprioception/vibration     (  ) loss of touch/temperature (specify:    )    SKIN:   (  ) No rashes or lesions     (  ) maculopapular rash     (  ) pustules     (  ) vesicles     (  ) ulcer     (  ) ecchymosis     (specify location:     )    AMPAC score:    (  ) Indwelling Awan Catheter:   Date insterted:    Reason (  ) Critical illness     (  ) urinary retention    (  ) Accurate Ins/Outs Monitoring     (  ) CMO patient    (  ) Central Line:   Date inserted:  Location: (  ) Right IJ     (  ) Left IJ     (  ) Right Fem     (  ) Left Fem    (  ) SPC        (  ) pigtail       (  ) PEG tube       (  ) colostomy       (  ) jejunostomy  (  ) U-Dall    LABS:                        8.5    9.03  )-----------( 590      ( 2024 06:18 )             31.1     04-13    141  |  104  |  11  ----------------------------<  96  5.0   |  24  |  1.1    Ca    9.9      2024 06:18  Mg     2.1     04-13        Urinalysis Basic - ( 2024 06:18 )    Color: x / Appearance: x / SG: x / pH: x  Gluc: 96 mg/dL / Ketone: x  / Bili: x / Urobili: x   Blood: x / Protein: x / Nitrite: x   Leuk Esterase: x / RBC: x / WBC x   Sq Epi: x / Non Sq Epi: x / Bacteria: x                RADIOLOGY:    < from: Xray Chest 2 Views PA/Lat (24 @ 13:58) >    Impression:    No radiographic evidence of acute cardiopulmonary disease.        --- End of Report ---    < end of copied text >      ASSESSMENT/PLAN:     23 year old male with PMH of Anemia (baseline Hb 9, never had transfusion), Sickle cell trait, learning disability, ADHD, HLD, KIMMIE presents to ED for anemia. Admitted for symptomatic anemia requiring transfusion.    #Symptomatic BETTY Acute on Chronic  #Recent H/o Hemoptysis attributed to Bronchitis  #Sickle Cell Trait  #Thrombocytosis  - hb 4.8 on admission (baseline 9.1 in 2024), MCV 51.8, Platelet 636  - Retic 2.2,   - Iron 15, % Sat 4  - EKG sinus tach  - s/p 3 units RBCs   - maintain active T&S  - monitor H&H  - low B12 & folate  - s/p EGD (): erosive esophagitis & gastritis  - continue folate   - continue IM B12 supplementation  - Heme consulted  - transglutaminase Ab IgA (-), IgG slightly elevated  - increased Venofer to 20mg BID    - f/u IgA levels, UA, Methylmalonic acid, Homocysteine  - H&H stable   - consider further work-up as OP    #Intermittent Dysphagia w/ Globus Sensation  - SLP cleared for regular diet  - GI consulted  - s/p EGD (): Erosive esophagitis & gastritis  - f/u pathology  - switched to Protonix 40 mg PO BID to complete 8 week course from 04/10  - continue IV Venofer to complete 5-day course from 04/10      #HLD  - Lipid panel: , TG 67, HDL 39, LDL 53  - A1c 5.1    #Depression  - continue Fluoxetine 10mg qd                                           --------------------------------------------------------------    # DVT prophylaxis: Lovenox  # GI prophylaxis: PPI  # Diet:   # Activity:   # Code status: Full Code  # Disposition:    Pendin) DAINA consult

## 2024-04-13 NOTE — DISCHARGE NOTE PROVIDER - NSDCFUADDAPPT_GEN_ALL_CORE_FT
- Follow up with our [facility] located at [address] [place] [address]. Phone Number: [phone]

## 2024-04-13 NOTE — DISCHARGE NOTE PROVIDER - HOSPITAL COURSE
23M w/ PMHx h/o Anemia (baseline hgb of 9, never had transfusions), Sickle Cell Trait (not disease), Learning Disability, ADHD, HLD and KIMMIE presents the ED for anemia and sent in by his pulmonary doctor for a hemoglobin of 5.0. Admits to having dyspnea on exertion, lightheadedness and intermittent dysphagia w/ globus sensation resulting in vomitus of food. Denies fevers, chills, chest pain, diarrhea abdominal pain, hematochezia, melena or hematuria. Never had EGD/colonoscopy. Denies recreational drug use. Of note, patient was recently admitted in Jan 2024 for hemoptysis. Patient was seen by infectious disease, pulm and ENT. Hemoptysis was attributed to possible viral bronchitis. Sputum cultures were negative for AFB - r/o-ed TB and CTA was negative for PE.  In ED,   Vitals: Temp 99.1F, /63, , RR 18, SpO2 98% on RA  Labs: Hgb 4.8 (previously 9.1), MCV 51.8, Platelet 636, Retic % 2.2, Total Iron 15, %sat 4, Cr 0.9 (previously 1.2),    EKG - Sinus Tach  s/p 2u pRBCs in ED.   Admitted to medicine for workup and management of symptomatic anemia.    1. Symptomatic iron deficiency anemia complicated by thrombocytosis and dysphagia s/p 3 PRBC  . hx of sickle cell trait   * Recent H/o Hemoptysis attributed to Bronchitis  - Admit to medicine        - ECG: sinus tachycardia              - hb 4.8 on admission (baseline 9.1 in 01/2024), MCV 51.8, Platelet 636  - Urine analysis:pending   - Cont PPI IV bid   - s/p 3 units RBCs   - started on B12, Folate Supplementation          -  Methylmalonic acid :pending   - s/p EGD (4/11): Erosive esophagitis & gastritis  - Colono:pending  - SLP cleared for regular diet  - continue IV Venofer to complete 5-day course from 04/10  - Transglutaminase Ab IgG slightly elevated. IgA TGG: negative       * pt has no wt loss, doubt malabsorption   - GI consulted  - heme consult:  a) obtain information regarding outpatient heme doctor from family member  b)  if GI work up (EGD, colonoscopy, capsule endoscopy) negative for any bleeding source, consider malabsorption studies.    2. HLD  - Lipid panel: , TG 67, HDL 39, LDL 53  - A1c 5.1    3. Depression  - continue Fluoxetine 10mg qd                                           --------------------------------------------------------------    # DVT prophylaxis: Lovenox  # GI prophylaxis: PPI  # Diet:   # Activity:   # Code status: Full Code  # Disposition:         23M w/ PMHx h/o Anemia (baseline hgb of 9, never had transfusions), Sickle Cell Trait (not disease), Learning Disability, ADHD, HLD and KIMMIE presents the ED for anemia and sent in by his pulmonary doctor for a hemoglobin of 5.0. Admits to having dyspnea on exertion, lightheadedness and intermittent dysphagia w/ globus sensation resulting in vomitus of food. Denies fevers, chills, chest pain, diarrhea abdominal pain, hematochezia, melena or hematuria. Never had EGD/colonoscopy. Denies recreational drug use. Of note, patient was recently admitted in Jan 2024 for hemoptysis. Patient was seen by infectious disease, pulm and ENT. Hemoptysis was attributed to possible viral bronchitis. Sputum cultures were negative for AFB - r/o-ed TB and CTA was negative for PE.  In ED,   Vitals: Temp 99.1F, /63, , RR 18, SpO2 98% on RA  Labs: Hgb 4.8 (previously 9.1), MCV 51.8, Platelet 636, Retic % 2.2, Total Iron 15, %sat 4, Cr 0.9 (previously 1.2),    EKG - Sinus Tach  s/p 2u pRBCs in ED.   Admitted to medicine for workup and management of symptomatic anemia.    1. Symptomatic iron deficiency anemia  s/p 3 PRBC  . hx of sickle cell trait   * Recent H/o Hemoptysis attributed to Bronchitis  - Admit to medicine        - ECG: sinus tachycardia              - hb 4.8 on admission (baseline 9.1 in 01/2024), MCV 51.8, Platelet 636  - started on B12, Folate Supplementation       - s/p EGD (4/11): Erosive esophagitis & gastritis  - SLP cleared for regular diet  - continue IV Venofer to complete 5-day course from 04/10 -0 switch to po on d/c  - Transglutaminase Ab IgG slightly elevated. IgA TGG: negative         2. HLD  - Lipid panel: , TG 67, HDL 39, LDL 53  - A1c 5.1    3. Depression  - continue Fluoxetine 10mg qd                       medically stable for discharge to follow up with PMD, GI, Hem/Onc as outpatient

## 2024-04-13 NOTE — DISCHARGE NOTE PROVIDER - NSDCFUSCHEDAPPT_GEN_ALL_CORE_FT
Kobe Robles  [facility] PreAdmits  Scheduled Appointment: 07/12/2024    [facility]  [facility] [place] Beau Morrissey  Scheduled Appointment: 07/12/2024

## 2024-04-13 NOTE — DISCHARGE NOTE PROVIDER - NSDCCPGOAL_GEN_ALL_CORE_FT
To get better and follow your care plan as instructed. Mart-1 - Negative Histology Text: MART-1 staining demonstrates a normal density and pattern of melanocytes along the dermal-epidermal junction. The surgical margins are negative for tumor cells.

## 2024-04-13 NOTE — DISCHARGE NOTE PROVIDER - NSDCCPCAREPLAN_GEN_ALL_CORE_FT
PRINCIPAL DISCHARGE DIAGNOSIS  Diagnosis: Anemia  Assessment and Plan of Treatment: Anemia  Anemia is a condition in which the concentration of red blood cells or hemoglobin in the blood is below normal. Hemoglobin is a substance in red blood cells that carries oxygen to the tissues of the body. Anemia results in not enough oxygen reaching these tissues which can cause symptoms such as weakness, dizziness/lightheadedness, shortness of breath, chest pain, paleness, or nausea. The cause of your anemia may or may not be determined immediately. If your hemoglobin was dangerously low, you may have received a blood transfusion. Usually reactions to transfusions occur immediately but monitor yourself for any fevers, rash, or shortness of breath.  SEEK IMMEDIATE MEDICAL CARE IF YOU HAVE ANY OF THE FOLLOWING SYMPTOMS: extreme weakness/chest pain/shortness of breath, black or bloody stools, vomiting blood, fainting, fever, or any signs of dehydration.  Please follow-up with Hematology & your PCP as outpatient.      SECONDARY DISCHARGE DIAGNOSES  Diagnosis: Gastritis  Assessment and Plan of Treatment: Gastritis  Gastritis is soreness and swelling (inflammation) of the lining of the stomach. Gastritis can develop as a sudden onset (acute) or long-term (chronic) condition. If gastritis is not treated, it can lead to stomach bleeding and ulcers. Causes include viral and bacterial infections, excessive alcohol consumption, tobacco use, or certain medications. Symptoms include nausea, vomiting, or abdominal pain or burning especially after eating. Avoid foods or drinks that make your symptoms worse such as caffeine, chocolate, spicy foods, acidic foods, or alcohol.  SEEK IMMEDIATE MEDICAL CARE IF YOU HAVE ANY OF THE FOLLOWING SYMPTOMS: black or bloody stools, blood or coffee-ground-colored vomitus, worsening abdominal pain, fever, or inability to keep fluids down.    Diagnosis: Erosive esophagitis  Assessment and Plan of Treatment:

## 2024-04-13 NOTE — DISCHARGE NOTE PROVIDER - NSDCMRMEDTOKEN_GEN_ALL_CORE_FT
FLUoxetine 20 mg oral tablet: 1 tab(s) orally once a day   cyanocobalamin 1000 mcg oral tablet: 1 tab(s) orally once a day  ferrous sulfate 200 mg (65 mg elemental iron) oral tablet: 1 tab(s) orally every other day  FLUoxetine 20 mg oral tablet: 1 tab(s) orally once a day  folic acid 1 mg oral tablet: 1 tab(s) orally once a day  pantoprazole 40 mg oral delayed release tablet: 1 tab(s) orally 2 times a day

## 2024-04-13 NOTE — DISCHARGE NOTE PROVIDER - CARE PROVIDERS DIRECT ADDRESSES
,[email or web address]

## 2024-04-13 NOTE — DISCHARGE NOTE NURSING/CASE MANAGEMENT/SOCIAL WORK - NSDCPEFALRISK_GEN_ALL_CORE
For information on Fall & Injury Prevention, visit: [email or web address]/[email or web address]-[email or web address] OR  [email or web address]://[email or web address].[facility]/[email or web address]-tips-to-[email or web address]-injury OR  https://www.cdc.gov/steadi/patient.html

## 2024-04-13 NOTE — DISCHARGE NOTE NURSING/CASE MANAGEMENT/SOCIAL WORK - NSDCVIVACCINE_GEN_ALL_CORE_FT
Influenza, injectable,quadrivalent, preservative free, pediatric; 2018/9/0 ; Haylee Lui (DELBERT);

## 2024-04-13 NOTE — DISCHARGE NOTE PROVIDER - PROVIDER TOKENS
PROVIDER:[TOKEN:[[id number]]] PROVIDER:[TOKEN:[[id number]]],PROVIDER:[TOKEN:[[id number]],FOLLOWUP:[1 week]]

## 2024-04-13 NOTE — DISCHARGE NOTE NURSING/CASE MANAGEMENT/SOCIAL WORK - NSDCFUADDAPPT_GEN_ALL_CORE_FT
- Follow up with our [facility] located at [address] [place] [address]. Phone Number: [phone]

## 2024-04-15 ENCOUNTER — NON-APPOINTMENT (OUTPATIENT)
Age: 24
End: 2024-04-15

## 2024-04-15 PROBLEM — D57.3 SICKLE-CELL TRAIT: Chronic | Status: ACTIVE | Noted: 2024-04-09

## 2024-04-15 PROBLEM — D64.9 ANEMIA, UNSPECIFIED: Chronic | Status: ACTIVE | Noted: 2024-04-09

## 2024-04-15 LAB
HEMOGLOBIN INTERPRETATION: SIGNIFICANT CHANGE UP
HGB A MFR BLD: 79.3 % — LOW (ref 95.8–98)
HGB A2 MFR BLD: 2.7 % — SIGNIFICANT CHANGE UP (ref 2–3.2)
HGB S MFR BLD: 18 % — HIGH

## 2024-04-19 DIAGNOSIS — F45.8 OTHER SOMATOFORM DISORDERS: ICD-10-CM

## 2024-04-19 DIAGNOSIS — K29.60 OTHER GASTRITIS WITHOUT BLEEDING: ICD-10-CM

## 2024-04-19 DIAGNOSIS — K22.10 ULCER OF ESOPHAGUS WITHOUT BLEEDING: ICD-10-CM

## 2024-04-19 DIAGNOSIS — E78.5 HYPERLIPIDEMIA, UNSPECIFIED: ICD-10-CM

## 2024-04-19 DIAGNOSIS — D75.839 THROMBOCYTOSIS, UNSPECIFIED: ICD-10-CM

## 2024-04-19 DIAGNOSIS — D57.3 SICKLE-CELL TRAIT: ICD-10-CM

## 2024-04-19 DIAGNOSIS — R00.0 TACHYCARDIA, UNSPECIFIED: ICD-10-CM

## 2024-04-19 DIAGNOSIS — K44.9 DIAPHRAGMATIC HERNIA WITHOUT OBSTRUCTION OR GANGRENE: ICD-10-CM

## 2024-04-19 DIAGNOSIS — F32.A DEPRESSION, UNSPECIFIED: ICD-10-CM

## 2024-04-19 DIAGNOSIS — F81.9 DEVELOPMENTAL DISORDER OF SCHOLASTIC SKILLS, UNSPECIFIED: ICD-10-CM

## 2024-04-19 DIAGNOSIS — D50.9 IRON DEFICIENCY ANEMIA, UNSPECIFIED: ICD-10-CM

## 2024-04-19 DIAGNOSIS — G47.33 OBSTRUCTIVE SLEEP APNEA (ADULT) (PEDIATRIC): ICD-10-CM

## 2024-04-19 DIAGNOSIS — F90.9 ATTENTION-DEFICIT HYPERACTIVITY DISORDER, UNSPECIFIED TYPE: ICD-10-CM

## 2024-04-19 LAB — METHYLMALONATE SERPL-SCNC: 196 NMOL/L — SIGNIFICANT CHANGE UP (ref 0–378)

## 2024-05-09 NOTE — ED PROVIDER NOTE - NSFOLLOWUPINSTRUCTIONS_ED_ALL_ED_FT
Our Emergency Department Referral Coordinators will be reaching out to you in the next 24-48 hours from 9:00am to 5:00pm with a follow up appointment. Please expect a phone call from the hospital in that time frame. If you do not receive a call or if you have any questions or concerns, you can reach them at   [phone]    You can take ibuprofen 200mg every 6 hours as needed for pain. This can be purchased over-the-counter at any major drug store.    Continue your sleep study appointment as scheduled.  We will contact you to set up appointments with pulmonary and ENT specialist.    Hemoptysis  Outline of the upper body, showing parts of the respiratory system, highlighting the nose, throat, and lungs.  Hemoptysis is when you cough up blood. If it is mild, you may cough up small amounts of bloody spit and mucus from your lungs. If it is very bad, you may cough up a lot of blood. If you cough up 1–2 cups (240–480 mL) of blood within 24 hours, get help right away.  Common causes of mild (nonmassive) hemoptysis include:  Infection in your nose, throat, or lungs.  Nosebleeds.  Breathing in an object that is not meant to be inside your body.  Common causes of very bad (massive) hemoptysis include:  Tuberculosis (TB).  A tumor in the lungs or upper airway.  A blood clot in the lungs.  Stomach bleeding or ulcer.  Taking blood thinners.  Having problems with blood clotting.  Sometimes, the cause is not known.    Follow these instructions at home:  Medicines    If you were prescribed an antibiotic medicine, take it as told by your doctor. Do not stop taking it even if you start to feel better.  Take over-the-counter and prescription medicines only as told by your doctor.  General instructions    A sign showing that a person should not smoke.  Do not smoke or use any products that contain nicotine or tobacco. If you need help quitting, ask your doctor.  Return to your normal activities when your doctor says that it is safe.  Ask your doctor if it is okay for you to exercise or go on an airplane.  Keep all follow-up visits.  Contact a doctor if:  You have a fever over 100.4°F (38°C).  You cough up more blood than before.  The blood looks brighter or darker red.  Get help right away if:  You cough up fresh blood or blood clots.  You cough up 1–2 cups (240–480 mL) of blood in 24 hours.  You have trouble breathing.  You feel like you are choking.  You have chest pain.  You feel dizzy or light-headed.  These symptoms may be an emergency. Get help right away. Call 911.  Do not wait to see if the symptoms will go away.  Do not drive yourself to the hospital.  Summary  Hemoptysis is coughing up blood.  If you cough up 1–2 cups (240–480 mL) of blood in 24 hours, get help right away.  Do not smoke or use any products that contain nicotine or tobacco. If you need help quitting, ask your doctor.  This information is not intended to replace advice given to you by your health care provider. Make sure you discuss any questions you have with your health care provider. Our Emergency Department Referral Coordinators will be reaching out to you in the next 24-48 hours from 9:00am to 5:00pm with a follow up appointment. Please expect a phone call from the hospital in that time frame. If you do not receive a call or if you have any questions or concerns, you can reach them at   [phone]    You can take ibuprofen 200mg every 6 hours as needed for pain. This can be purchased over-the-counter at any major drug store.    Continue your sleep study appointment as scheduled.  We will contact you to set up appointments with pulmonary and ENT specialist.    Hemoptysis  Outline of the upper body, showing parts of the respiratory system, highlighting the nose, throat, and lungs.  Hemoptysis is when you cough up blood. If it is mild, you may cough up small amounts of bloody spit and mucus from your lungs. If it is very bad, you may cough up a lot of blood. If you cough up 1–2 cups (240–480 mL) of blood within 24 hours, get help right away.  Common causes of mild (nonmassive) hemoptysis include:  Infection in your nose, throat, or lungs.  Nosebleeds.  Breathing in an object that is not meant to be inside your body.  Common causes of very bad (massive) hemoptysis include:  Tuberculosis (TB).  A tumor in the lungs or upper airway.  A blood clot in the lungs.  Stomach bleeding or ulcer.  Taking blood thinners.  Having problems with blood clotting.  Sometimes, the cause is not known.    Follow these instructions at home:  Medicines    If you were prescribed an antibiotic medicine, take it as told by your doctor. Do not stop taking it even if you start to feel better.  Take over-the-counter and prescription medicines only as told by your doctor.  General instructions    A sign showing that a person should not smoke.  Do not smoke or use any products that contain nicotine or tobacco. If you need help quitting, ask your doctor.  Return to your normal activities when your doctor says that it is safe.  Ask your doctor if it is okay for you to exercise or go on an airplane.  Keep all follow-up visits.  Contact a doctor if:  You have a fever over 100.4°F (38°C).  You cough up more blood than before.  The blood looks brighter or darker red.  Get help right away if:  You cough up fresh blood or blood clots.  You cough up 1–2 cups (240–480 mL) of blood in 24 hours.  You have trouble breathing.  You feel like you are choking.  You have chest pain.  You feel dizzy or light-headed.  These symptoms may be an emergency. Get help right away. Call 911.  Do not wait to see if the symptoms will go away.  Do not drive yourself to the hospital.  Summary  Hemoptysis is coughing up blood.  If you cough up 1–2 cups (240–480 mL) of blood in 24 hours, get help right away.  Do not smoke or use any products that contain nicotine or tobacco. If you need help quitting, ask your doctor.  This information is not intended to replace advice given to you by your health care provider. Make sure you discuss any questions you have with your health care provider. No assistance needed

## 2024-05-31 ENCOUNTER — OUTPATIENT (OUTPATIENT)
Dept: OUTPATIENT SERVICES | Facility: HOSPITAL | Age: 24
LOS: 1 days | End: 2024-05-31
Payer: MEDICAID

## 2024-05-31 VITALS
RESPIRATION RATE: 18 BRPM | WEIGHT: 233.69 LBS | DIASTOLIC BLOOD PRESSURE: 74 MMHG | HEIGHT: 69 IN | SYSTOLIC BLOOD PRESSURE: 134 MMHG | TEMPERATURE: 98 F | HEART RATE: 90 BPM | OXYGEN SATURATION: 98 %

## 2024-05-31 DIAGNOSIS — J35.1 HYPERTROPHY OF TONSILS: ICD-10-CM

## 2024-05-31 DIAGNOSIS — Z01.818 ENCOUNTER FOR OTHER PREPROCEDURAL EXAMINATION: ICD-10-CM

## 2024-05-31 PROCEDURE — 99214 OFFICE O/P EST MOD 30 MIN: CPT | Mod: 25

## 2024-05-31 NOTE — H&P PST ADULT - NSICDXPASTMEDICALHX_GEN_ALL_CORE_FT
PAST MEDICAL HISTORY:  ADHD     Anemia     HLD (hyperlipidemia)     Learning disability     Sickle cell trait      PAST MEDICAL HISTORY:  ADHD     Anemia     HLD (hyperlipidemia)     Learning disability     Obstructive sleep apnea syndrome in adult     Sickle cell trait

## 2024-05-31 NOTE — H&P PST ADULT - HISTORY OF PRESENT ILLNESS
Patient is a 23 year old male presenting to PAST in preparation for  BILATERAL TONSILLECTOMY AND ADEINODECTOMY on 6/17  under gen anesthesia by Dr. Nunez  PATIENT CURRENTLY DENIES CHEST PAIN  SHORTNESS OF BREATH  PALPITATIONS,  DYSURIA, OR UPPER RESPIRATORY INFECTION IN PAST 2 WEEKS    Anesthesia Alert  NO--Difficult Airway  NO--History of neck surgery or radiation  NO--Limited ROM of neck  NO--History of Malignant hyperthermia  NO--Personal or family history of Pseudocholinesterase deficiency  NO--Prior Anesthesia Complication  NO--Latex Allergy  NO--Loose teeth  NO--History of Rheumatoid Arthritis  NO--KIMMIE  NO-- BLEEDING RISK  NO--Other_____    As per patient, this is their complete medical and surgical history, including medications both prescribed or over the counter.  Patient verbalized understanding of instructions and was given the opportunity to ask questions and have them answered.   Patient is a 23 year old male presenting to PAST in preparation for  BILATERAL TONSILLECTOMY AND ADEINODECTOMY on 6/17  under gen anesthesia by Dr. Nunez  Reports h/o bob reports he was advised to have above  PATIENT CURRENTLY DENIES CHEST PAIN  SHORTNESS OF BREATH  PALPITATIONS,  DYSURIA, OR UPPER RESPIRATORY INFECTION IN PAST 2 WEEKS    Anesthesia Alert  NO--Difficult Airway  NO--History of neck surgery or radiation  NO--Limited ROM of neck  NO--History of Malignant hyperthermia  NO--Personal or family history of Pseudocholinesterase deficiency  NO--Prior Anesthesia Complication  NO--Latex Allergy  NO--Loose teeth  NO--History of Rheumatoid Arthritis  NO--BOB  NO-- BLEEDING RISK  NO--Other_____    Duke Activity Status Index (DASI) from [employer]  on 5/31/2024      RESULT SUMMARY:  44.95 points  The higher the score (maximum 58.2), the higher the functional status.    8.27 METs        INPUTS:  Take care of self —> 2.75 = Yes  Walk indoors —> 1.75 = Yes  Walk 1&ndash;2 blocks on level ground —> 2.75 = Yes  Climb a flight of stairs or walk up a hill —> 5.5 = Yes  Run a short distance —> 0 = No  Do light work around the house —> 2.7 = Yes  Do moderate work around the house —> 3.5 = Yes  Do heavy work around the house —> 8 = Yes  Do yardwork —> 4.5 = Yes  Have sexual relations —> 0 = No  Participate in moderate recreational activities —> 6 = Yes  Participate in strenuous sports —> 7.5 = Yes      Revised Cardiac Risk Index for Pre-Operative Risk from [employer]  on 5/31/2024      RESULT SUMMARY:  0 points  Class I Risk    3.9 %  30-day risk of death, MI, or cardiac arrest    From Duceppe 2017. These numbers are higher than those from the original study (Ayan 1999). See Evidence for details.      INPUTS:  Elevated-risk surgery —> 0 = No  History of ischemic heart disease —> 0 = No  History of congestive heart failure —> 0 = No  History of cerebrovascular disease —> 0 = No  Pre-operative treatment with insulin —> 0 = No  Pre-operative creatinine >2 mg/dL / 176.8 µmol/L —> 0 = No        As per patient, this is their complete medical and surgical history, including medications both prescribed or over the counter.  Patient verbalized understanding of instructions and was given the opportunity to ask questions and have them answered.

## 2024-06-01 ENCOUNTER — APPOINTMENT (OUTPATIENT)
Dept: INTERNAL MEDICINE | Facility: CLINIC | Age: 24
End: 2024-06-01

## 2024-06-01 DIAGNOSIS — J35.1 HYPERTROPHY OF TONSILS: ICD-10-CM

## 2024-06-01 DIAGNOSIS — Z01.818 ENCOUNTER FOR OTHER PREPROCEDURAL EXAMINATION: ICD-10-CM

## 2024-06-12 ENCOUNTER — OUTPATIENT (OUTPATIENT)
Dept: OUTPATIENT SERVICES | Facility: HOSPITAL | Age: 24
LOS: 1 days | End: 2024-06-12
Payer: MEDICAID

## 2024-06-12 ENCOUNTER — APPOINTMENT (OUTPATIENT)
Dept: INTERNAL MEDICINE | Facility: CLINIC | Age: 24
End: 2024-06-12
Payer: MEDICAID

## 2024-06-12 VITALS
BODY MASS INDEX: 34.4 KG/M2 | TEMPERATURE: 97.7 F | SYSTOLIC BLOOD PRESSURE: 119 MMHG | HEART RATE: 124 BPM | HEIGHT: 69 IN | DIASTOLIC BLOOD PRESSURE: 75 MMHG | WEIGHT: 232.25 LBS | OXYGEN SATURATION: 97 %

## 2024-06-12 DIAGNOSIS — D57.3 SICKLE-CELL TRAIT: ICD-10-CM

## 2024-06-12 DIAGNOSIS — E66.9 OBESITY, UNSPECIFIED: ICD-10-CM

## 2024-06-12 DIAGNOSIS — Z00.00 ENCOUNTER FOR GENERAL ADULT MEDICAL EXAMINATION W/OUT ABNORMAL FINDINGS: ICD-10-CM

## 2024-06-12 DIAGNOSIS — G47.33 OBSTRUCTIVE SLEEP APNEA (ADULT) (PEDIATRIC): ICD-10-CM

## 2024-06-12 DIAGNOSIS — Z00.00 ENCOUNTER FOR GENERAL ADULT MEDICAL EXAMINATION WITHOUT ABNORMAL FINDINGS: ICD-10-CM

## 2024-06-12 PROCEDURE — 83090 ASSAY OF HOMOCYSTEINE: CPT

## 2024-06-12 PROCEDURE — 99214 OFFICE O/P EST MOD 30 MIN: CPT

## 2024-06-12 PROCEDURE — 83550 IRON BINDING TEST: CPT

## 2024-06-12 PROCEDURE — 85027 COMPLETE CBC AUTOMATED: CPT

## 2024-06-12 PROCEDURE — 99214 OFFICE O/P EST MOD 30 MIN: CPT | Mod: GC

## 2024-06-12 PROCEDURE — 87522 HEPATITIS C REVRS TRNSCRPJ: CPT

## 2024-06-12 PROCEDURE — 83921 ORGANIC ACID SINGLE QUANT: CPT

## 2024-06-12 PROCEDURE — 82728 ASSAY OF FERRITIN: CPT

## 2024-06-12 PROCEDURE — 82746 ASSAY OF FOLIC ACID SERUM: CPT

## 2024-06-12 PROCEDURE — 85730 THROMBOPLASTIN TIME PARTIAL: CPT

## 2024-06-12 PROCEDURE — 80061 LIPID PANEL: CPT

## 2024-06-12 PROCEDURE — 83036 HEMOGLOBIN GLYCOSYLATED A1C: CPT

## 2024-06-12 PROCEDURE — 80053 COMPREHEN METABOLIC PANEL: CPT

## 2024-06-12 PROCEDURE — 82607 VITAMIN B-12: CPT

## 2024-06-12 PROCEDURE — 83540 ASSAY OF IRON: CPT

## 2024-06-12 PROCEDURE — 85610 PROTHROMBIN TIME: CPT

## 2024-06-12 RX ORDER — OLMESARTAN MEDOXOMIL 40 MG/1
TABLET, FILM COATED ORAL
Refills: 0 | Status: DISCONTINUED | COMMUNITY
End: 2024-06-12

## 2024-06-12 RX ORDER — FLUOXETINE HYDROCHLORIDE 60 MG/1
TABLET ORAL
Refills: 0 | Status: DISCONTINUED | COMMUNITY
End: 2024-06-12

## 2024-06-12 RX ORDER — DEXMETHYLPHENIDATE HYDROCHLORIDE 35 MG/1
CAPSULE, EXTENDED RELEASE ORAL
Refills: 0 | Status: DISCONTINUED | COMMUNITY
End: 2024-06-12

## 2024-06-12 RX ORDER — QUETIAPINE 400 MG/1
TABLET, FILM COATED ORAL
Refills: 0 | Status: DISCONTINUED | COMMUNITY
End: 2024-06-12

## 2024-06-12 NOTE — PHYSICAL EXAM
[No Acute Distress] : no acute distress [Well-Appearing] : well-appearing [No JVD] : no jugular venous distention [No Lymphadenopathy] : no lymphadenopathy [Supple] : supple [Thyroid Normal, No Nodules] : the thyroid was normal and there were no nodules present [No Respiratory Distress] : no respiratory distress  [No Accessory Muscle Use] : no accessory muscle use [Clear to Auscultation] : lungs were clear to auscultation bilaterally [Normal Rate] : normal rate  [Regular Rhythm] : with a regular rhythm [Normal S1, S2] : normal S1 and S2 [No Murmur] : no murmur heard [No Carotid Bruits] : no carotid bruits [No Abdominal Bruit] : a ~M bruit was not heard ~T in the abdomen [No Varicosities] : no varicosities [Pedal Pulses Present] : the pedal pulses are present [No Edema] : there was no peripheral edema [No Palpable Aorta] : no palpable aorta [No Extremity Clubbing/Cyanosis] : no extremity clubbing/cyanosis [Soft] : abdomen soft [Non Tender] : non-tender [Non-distended] : non-distended [No Masses] : no abdominal mass palpated [No HSM] : no HSM [Normal Bowel Sounds] : normal bowel sounds [Normal Posterior Cervical Nodes] : no posterior cervical lymphadenopathy [Normal Anterior Cervical Nodes] : no anterior cervical lymphadenopathy [No CVA Tenderness] : no CVA  tenderness [No Spinal Tenderness] : no spinal tenderness [No Joint Swelling] : no joint swelling [Grossly Normal Strength/Tone] : grossly normal strength/tone [No Rash] : no rash [Coordination Grossly Intact] : coordination grossly intact [No Focal Deficits] : no focal deficits [Normal Gait] : normal gait [Deep Tendon Reflexes (DTR)] : deep tendon reflexes were 2+ and symmetric [Normal] : normal gait, coordination grossly intact, no focal deficits and deep tendon reflexes were 2+ and symmetric [de-identified] : large tonsil b/l

## 2024-06-12 NOTE — HISTORY OF PRESENT ILLNESS
[FreeTextEntry1] : Pt. here to establish care [de-identified] : Pt. is a 24yo male with h/o ADHD, sickle cell trait, anemia (EGD 4/11/24 showed erosive esophagitis/gastritis), and h/o KIMMIE, seen by pulmonary and ENT and anticipate tonsillectomy/adenoidectomy.  Here to establish care and for medical evaluation prior to surgery.  Pt. denies any complaint, no CP, SOB, CANO, palpitations, dizziness, headache, hematemesis, melena, BRBPR, fatigue.  Pt. reports that he was hospitalized 4/2024 for hematemsis resulted in drop in H/H, required transfusion.  Pt.was d/c'd on 4/13/24 with HgB about 8

## 2024-06-12 NOTE — ASSESSMENT
[FreeTextEntry1] : #Anemia - EGD done inpatient 4/11/24 showed erosive esophagitis/gastritis - pt. did not schedule GI follow up after discharged - GI referral for h/o hematemesis - now resolved - check H/H - follow hematology Dr. James 6/14/24  #KIMMIE #Obesity - follow pulmonary - seen ENT, and plan tonsillectomy/adenoidectomy; will defer procedure until workup for anemia - weight loss, exercise as tolerated - pt. refused nutrition referral  #HCM - EKG - fasting CMP, CBC, lipid profile, A1C, PT/PTT/INR, B12, folate, homocysteine, methylmalonic acid, iron, TIBC, iron saturation, ferritin - RTC 1 month

## 2024-06-13 DIAGNOSIS — G47.33 OBSTRUCTIVE SLEEP APNEA (ADULT) (PEDIATRIC): ICD-10-CM

## 2024-06-13 DIAGNOSIS — Z00.00 ENCOUNTER FOR GENERAL ADULT MEDICAL EXAMINATION WITHOUT ABNORMAL FINDINGS: ICD-10-CM

## 2024-06-13 DIAGNOSIS — D64.9 ANEMIA, UNSPECIFIED: ICD-10-CM

## 2024-06-13 DIAGNOSIS — E66.9 OBESITY, UNSPECIFIED: ICD-10-CM

## 2024-06-13 LAB
ALBUMIN SERPL ELPH-MCNC: 4.7 G/DL
ALP BLD-CCNC: 76 U/L
ALT SERPL-CCNC: 14 U/L
ANION GAP SERPL CALC-SCNC: 15 MMOL/L
APTT BLD: 31.8 SEC
AST SERPL-CCNC: 14 U/L
BILIRUB SERPL-MCNC: 0.4 MG/DL
BUN SERPL-MCNC: 16 MG/DL
CALCIUM SERPL-MCNC: 10.2 MG/DL
CHLORIDE SERPL-SCNC: 103 MMOL/L
CHOLEST SERPL-MCNC: 147 MG/DL
CO2 SERPL-SCNC: 26 MMOL/L
CREAT SERPL-MCNC: 1.1 MG/DL
EGFR: 97 ML/MIN/1.73M2
ESTIMATED AVERAGE GLUCOSE: 97 MG/DL
FERRITIN SERPL-MCNC: 6 NG/ML
FOLATE SERPL-MCNC: 12 NG/ML
GLUCOSE SERPL-MCNC: 116 MG/DL
HBA1C MFR BLD HPLC: 5 %
HCT VFR BLD CALC: 35 %
HCV RNA SERPL NAA+PROBE-LOG IU: NOT DETECTED LOGIU/ML
HDLC SERPL-MCNC: 48 MG/DL
HEPC RNA INTERP: NOT DETECTED IU/ML
HGB BLD-MCNC: 9.1 G/DL
INR PPP: 1.16 RATIO
IRON SATN MFR SERPL: 3 %
IRON SERPL-MCNC: 13 UG/DL
LDLC SERPL CALC-MCNC: 84 MG/DL
MCHC RBC-ENTMCNC: 15.6 PG
MCHC RBC-ENTMCNC: 26 G/DL
MCV RBC AUTO: 59.9 FL
NONHDLC SERPL-MCNC: 99 MG/DL
PLATELET # BLD AUTO: 618 K/UL
PMV BLD AUTO: 0 /100 WBCS
PMV BLD: 8.7 FL
POTASSIUM SERPL-SCNC: 4.8 MMOL/L
PROT SERPL-MCNC: 7.6 G/DL
PT BLD: 13.3 SEC
RBC # BLD: 5.84 M/UL
RBC # FLD: 24.7 %
SODIUM SERPL-SCNC: 144 MMOL/L
TIBC SERPL-MCNC: 487 UG/DL
TRIGL SERPL-MCNC: 76 MG/DL
UIBC SERPL-MCNC: 474 UG/DL
VIT B12 SERPL-MCNC: 707 PG/ML
WBC # FLD AUTO: 7.41 K/UL

## 2024-06-14 ENCOUNTER — APPOINTMENT (OUTPATIENT)
Age: 24
End: 2024-06-14
Payer: MEDICAID

## 2024-06-14 ENCOUNTER — OUTPATIENT (OUTPATIENT)
Dept: OUTPATIENT SERVICES | Facility: HOSPITAL | Age: 24
LOS: 1 days | End: 2024-06-14
Payer: MEDICAID

## 2024-06-14 VITALS
TEMPERATURE: 98.9 F | WEIGHT: 234.06 LBS | HEART RATE: 114 BPM | SYSTOLIC BLOOD PRESSURE: 136 MMHG | DIASTOLIC BLOOD PRESSURE: 79 MMHG | HEIGHT: 69 IN | OXYGEN SATURATION: 97 % | BODY MASS INDEX: 34.67 KG/M2

## 2024-06-14 VITALS — SYSTOLIC BLOOD PRESSURE: 136 MMHG | HEART RATE: 114 BPM | TEMPERATURE: 99 F | DIASTOLIC BLOOD PRESSURE: 79 MMHG

## 2024-06-14 DIAGNOSIS — D64.9 ANEMIA, UNSPECIFIED: ICD-10-CM

## 2024-06-14 DIAGNOSIS — D50.9 IRON DEFICIENCY ANEMIA, UNSPECIFIED: ICD-10-CM

## 2024-06-14 PROCEDURE — 99213 OFFICE O/P EST LOW 20 MIN: CPT

## 2024-06-14 PROCEDURE — 99203 OFFICE O/P NEW LOW 30 MIN: CPT | Mod: 25

## 2024-06-14 PROCEDURE — 96365 THER/PROPH/DIAG IV INF INIT: CPT

## 2024-06-14 RX ORDER — FOLIC ACID 1 MG/1
1 TABLET ORAL
Refills: 0 | Status: ACTIVE | COMMUNITY

## 2024-06-14 RX ORDER — CHLORHEXIDINE GLUCONATE 4 %
1000 LIQUID (ML) TOPICAL
Refills: 0 | Status: ACTIVE | COMMUNITY

## 2024-06-14 RX ORDER — ACETAMINOPHEN 325 MG/1
650 TABLET ORAL ONCE
Refills: 0 | Status: COMPLETED | OUTPATIENT
Start: 2024-06-14 | End: 2024-06-14

## 2024-06-14 RX ORDER — FLUOXETINE HYDROCHLORIDE 20 MG/1
20 CAPSULE ORAL
Refills: 0 | Status: ACTIVE | COMMUNITY

## 2024-06-14 RX ORDER — CHLORHEXIDINE GLUCONATE 4 %
325 (65 FE) LIQUID (ML) TOPICAL
Refills: 0 | Status: ACTIVE | COMMUNITY

## 2024-06-14 RX ORDER — PANTOPRAZOLE 40 MG/1
40 TABLET, DELAYED RELEASE ORAL
Refills: 0 | Status: ACTIVE | COMMUNITY

## 2024-06-14 RX ORDER — FERUMOXYTOL 510 MG/17ML
1020 INJECTION INTRAVENOUS ONCE
Refills: 0 | Status: COMPLETED | OUTPATIENT
Start: 2024-06-14 | End: 2024-06-14

## 2024-06-14 RX ORDER — DIPHENHYDRAMINE HCL 50 MG
25 CAPSULE ORAL ONCE
Refills: 0 | Status: COMPLETED | OUTPATIENT
Start: 2024-06-14 | End: 2024-06-14

## 2024-06-14 RX ADMIN — FERUMOXYTOL 1020 MILLIGRAM(S): 510 INJECTION INTRAVENOUS at 14:50

## 2024-06-14 RX ADMIN — FERUMOXYTOL 189.33 MILLIGRAM(S): 510 INJECTION INTRAVENOUS at 13:20

## 2024-06-14 RX ADMIN — Medication 25 MILLIGRAM(S): at 13:00

## 2024-06-14 RX ADMIN — ACETAMINOPHEN 650 MILLIGRAM(S): 325 TABLET ORAL at 13:00

## 2024-06-14 NOTE — PHYSICAL EXAM
[Fully active, able to carry on all pre-disease performance without restriction] : Status 0 - Fully active, able to carry on all pre-disease performance without restriction [Normal] : affect appropriate [de-identified] : But somewhat overweight [de-identified] : But somewhat fast heart rate

## 2024-06-14 NOTE — REVIEW OF SYSTEMS
[Recent Change In Weight] : ~T recent weight change [Negative] : Psychiatric [de-identified] : See history, No history of previous bruising or bleeding.

## 2024-06-14 NOTE — ASSESSMENT
[FreeTextEntry1] : Iron deficiency anemia, secondary to UGI bleeding. Recent iron studies from 2 days ago show a transferrin saturation of 3 % ferritin 6, normal folate and B12.  He has not had, at least by personal and family history and the presently available data, any previous hematologic problems, specifically, bleeding disorders. The patient is scheduled for tonsillectomy tomorrow. Repeat CBC from yesterday showed a Hgb of 9.1 with an MCV of 59.9, RDW 24.7%, platelets 618 K, WBC 7.41. We went over those results.  Will proceed with Feraheme 1020 mg today, premedicated with Tylenol and Benadryl' He has a follow up scheduled with his GI in a few months.  All questions answered.  F/U in 3 months for monitoring and as needed.

## 2024-06-14 NOTE — ASU PATIENT PROFILE, ADULT - FALL HARM RISK - UNIVERSAL INTERVENTIONS
Bed in lowest position, wheels locked, appropriate side rails in place/Call bell, personal items and telephone in reach/Instruct patient to call for assistance before getting out of bed or chair/Non-slip footwear when patient is out of bed/[place] to call system/Physically safe environment - no spills, clutter or unnecessary equipment/Purposeful Proactive Rounding/Room/bathroom lighting operational, light cord in reach

## 2024-06-14 NOTE — ASU PATIENT PROFILE, ADULT - NSICDXPASTMEDICALHX_GEN_ALL_CORE_FT
PAST MEDICAL HISTORY:  ADHD     Anemia     HLD (hyperlipidemia)     Learning disability     Obstructive sleep apnea syndrome in adult does not have a cpap machine    Sickle cell trait

## 2024-06-14 NOTE — HISTORY OF PRESENT ILLNESS
[Disease:__________________________] : Disease: [unfilled] [de-identified] : The patient is a 23-year-old HM referred by the hospital where he was admitted with "low blood counts" following an episode of severe hematemesis.  He is coming today also to get clearance before tonsillectomy tomorrow. He was transfused with a total of 6 units of PRBC. His work up showed a transferrin saturation of 3% and ferritin of 6. The patient has a sickle cell trait according to his mother, and he was never transfused in the past.  The patient was having hematemesis. He had an EGD in the hospital and was diagnosed with gastritis. Apparently, the patient is scheduled for tonsil surgery in 3 days. His clotting profile has been within normal.

## 2024-06-15 DIAGNOSIS — D64.9 ANEMIA, UNSPECIFIED: ICD-10-CM

## 2024-06-17 ENCOUNTER — TRANSCRIPTION ENCOUNTER (OUTPATIENT)
Age: 24
End: 2024-06-17

## 2024-06-17 ENCOUNTER — OUTPATIENT (OUTPATIENT)
Dept: OUTPATIENT SERVICES | Facility: HOSPITAL | Age: 24
LOS: 1 days | Discharge: HOME | End: 2024-06-17
Payer: MEDICAID

## 2024-06-17 ENCOUNTER — APPOINTMENT (OUTPATIENT)
Dept: OTOLARYNGOLOGY | Facility: AMBULATORY SURGERY CENTER | Age: 24
End: 2024-06-17

## 2024-06-17 ENCOUNTER — RESULT REVIEW (OUTPATIENT)
Age: 24
End: 2024-06-17

## 2024-06-17 VITALS
HEIGHT: 69 IN | OXYGEN SATURATION: 100 % | RESPIRATION RATE: 17 BRPM | HEART RATE: 87 BPM | TEMPERATURE: 98 F | WEIGHT: 233.69 LBS | SYSTOLIC BLOOD PRESSURE: 129 MMHG | DIASTOLIC BLOOD PRESSURE: 74 MMHG

## 2024-06-17 VITALS
RESPIRATION RATE: 17 BRPM | DIASTOLIC BLOOD PRESSURE: 73 MMHG | SYSTOLIC BLOOD PRESSURE: 123 MMHG | HEART RATE: 100 BPM | OXYGEN SATURATION: 98 %

## 2024-06-17 DIAGNOSIS — J35.1 HYPERTROPHY OF TONSILS: ICD-10-CM

## 2024-06-17 DIAGNOSIS — Z90.89 ACQUIRED ABSENCE OF OTHER ORGANS: ICD-10-CM

## 2024-06-17 PROCEDURE — 88304 TISSUE EXAM BY PATHOLOGIST: CPT

## 2024-06-17 PROCEDURE — 42821 REMOVE TONSILS AND ADENOIDS: CPT

## 2024-06-17 PROCEDURE — 88304 TISSUE EXAM BY PATHOLOGIST: CPT | Mod: 26

## 2024-06-17 RX ORDER — FLUOXETINE HCL 10 MG
1 CAPSULE ORAL
Refills: 0 | DISCHARGE

## 2024-06-17 RX ORDER — OXYCODONE HYDROCHLORIDE 5 MG/1
1 TABLET ORAL
Qty: 20 | Refills: 0
Start: 2024-06-17 | End: 2024-06-21

## 2024-06-17 RX ORDER — ONDANSETRON 8 MG/1
4 TABLET, FILM COATED ORAL ONCE
Refills: 0 | Status: DISCONTINUED | OUTPATIENT
Start: 2024-06-17 | End: 2024-06-17

## 2024-06-17 RX ORDER — ACETAMINOPHEN 160 MG/5ML
160 SOLUTION ORAL
Qty: 840 | Refills: 1 | Status: ACTIVE | COMMUNITY
Start: 2024-06-17 | End: 1900-01-01

## 2024-06-17 RX ORDER — HYDROMORPHONE HYDROCHLORIDE 2 MG/ML
0.5 INJECTION INTRAMUSCULAR; INTRAVENOUS; SUBCUTANEOUS
Refills: 0 | Status: DISCONTINUED | OUTPATIENT
Start: 2024-06-17 | End: 2024-06-17

## 2024-06-17 RX ORDER — AMOXICILLIN AND CLAVULANATE POTASSIUM 875; 125 MG/1; MG/1
875-125 TABLET, COATED ORAL
Qty: 14 | Refills: 0 | Status: ACTIVE | COMMUNITY
Start: 2024-06-17 | End: 1900-01-01

## 2024-06-17 RX ORDER — SODIUM CHLORIDE 9 MG/ML
1000 INJECTION, SOLUTION INTRAVENOUS
Refills: 0 | Status: DISCONTINUED | OUTPATIENT
Start: 2024-06-17 | End: 2024-06-17

## 2024-06-17 RX ORDER — PREDNISONE 10 MG/1
10 TABLET ORAL DAILY
Qty: 5 | Refills: 0 | Status: ACTIVE | COMMUNITY
Start: 2024-06-17 | End: 1900-01-01

## 2024-06-17 NOTE — ASU DISCHARGE PLAN (ADULT/PEDIATRIC) - NS MD DC FALL RISK RISK
For information on Fall & Injury Prevention, visit: [email or web address]/[email or web address]-[email or web address]-mobility OR  [email or web address]://[email or web address].[facility]/[email or web address]-tips-to-avoid-injury OR  https://www.cdc.gov/steadi/patient.html

## 2024-06-17 NOTE — CHART NOTE - NSCHARTNOTEFT_GEN_A_CORE
ANESTHESIA to PACU NOTE      ____ Intubated  TV:______       Rate: ______      FiO2: ______    __x__ Patent Airway    __x__ Full return of protective reflexes    ____ Full recovery from anesthesia / sedation to baseline status    Vitals:    /73  RR 16  O2sat. 100%  Temp: 37.0C      Mental Status:  ____ Awake   _____ Alert   __x___ Drowsy   _____ Sedated    Nausea/Vomiting: ____ Yes, See Post - Op Orders      __x__ No    Pain Scale (0-10): _____    Treatment: _x___ None    ____ See Post - Op/PCA Orders    Post - Operative Fluids:   ____ Oral   __x__ See Post - Op Orders    Plan:  Discharge to:   __x__Home       _____Floor      _____Critical Care    _____ Other:_________________    Comments: s/p general anesthesia with oral TEE. Pt was extubated post procedure to blow by at 5L/min. No anesthesia complications. Pt's condition is stable in PACU. Full report is given to PACU RN.

## 2024-06-17 NOTE — ASU DISCHARGE PLAN (ADULT/PEDIATRIC) - ASU DC SPECIAL INSTRUCTIONSFT
Rajeev had his tonsils and adenoid tissue removed today.  He did great!   Recommend no work for 1 week, no gym / exercise for 2 weeks and soft diet for 2 weeks.   I have prescribed tylenol and motrin for baseline pain control - please alternate these two medications every 3 hours.  I have prescribed oxycodone for break through pain.    Follow up in 1 month. Rajeev had his tonsils and adenoid tissue removed today.  He did great!   Recommend no work for 1 week, no gym / exercise for 2 weeks and soft diet for 2 weeks.   I have prescribed liquid tylenol for baseline pain control.  I have prescribed oxycodone for break through pain.  I have prescribed augmentin 875/125mg twice a day for 7 days and prednisone 10mg for 5 days.   Follow up in 1 month.

## 2024-06-17 NOTE — ASU DISCHARGE PLAN (ADULT/PEDIATRIC) - CARE PROVIDER_API CALL
Surya Garcia  Otolaryngology  [address] [place] [id number]  Phone: [phone]  Fax: [phone]  Established Patient  Follow Up Time: 1 month

## 2024-06-18 RX ORDER — OXYCODONE 10 MG/1
10 TABLET ORAL
Qty: 10 | Refills: 0 | Status: ACTIVE | COMMUNITY
Start: 2024-06-18 | End: 1900-01-01

## 2024-06-18 RX ORDER — NALOXONE HYDROCHLORIDE 4 MG/.1ML
4 SPRAY NASAL
Qty: 2 | Refills: 0 | Status: ACTIVE | COMMUNITY
Start: 2024-06-18 | End: 1900-01-01

## 2024-06-19 PROBLEM — G47.33 OBSTRUCTIVE SLEEP APNEA (ADULT) (PEDIATRIC): Chronic | Status: ACTIVE | Noted: 2024-05-31

## 2024-06-20 LAB
HOMOCYSTEINE LEVEL: 6.2 UMOL/L
METHYLMALONATE SERPL-SCNC: 182 NMOL/L

## 2024-06-22 DIAGNOSIS — J35.3 HYPERTROPHY OF TONSILS WITH HYPERTROPHY OF ADENOIDS: ICD-10-CM

## 2024-06-22 DIAGNOSIS — G47.33 OBSTRUCTIVE SLEEP APNEA (ADULT) (PEDIATRIC): ICD-10-CM

## 2024-06-22 DIAGNOSIS — D50.9 IRON DEFICIENCY ANEMIA, UNSPECIFIED: ICD-10-CM

## 2024-06-22 DIAGNOSIS — E78.5 HYPERLIPIDEMIA, UNSPECIFIED: ICD-10-CM

## 2024-06-22 DIAGNOSIS — F90.9 ATTENTION-DEFICIT HYPERACTIVITY DISORDER, UNSPECIFIED TYPE: ICD-10-CM

## 2024-07-15 ENCOUNTER — APPOINTMENT (OUTPATIENT)
Dept: INTERNAL MEDICINE | Facility: CLINIC | Age: 24
End: 2024-07-15
Payer: MEDICAID

## 2024-07-15 ENCOUNTER — OUTPATIENT (OUTPATIENT)
Dept: OUTPATIENT SERVICES | Facility: HOSPITAL | Age: 24
LOS: 1 days | End: 2024-07-15
Payer: MEDICAID

## 2024-07-15 VITALS
HEIGHT: 69 IN | HEART RATE: 120 BPM | OXYGEN SATURATION: 100 % | BODY MASS INDEX: 34.73 KG/M2 | SYSTOLIC BLOOD PRESSURE: 128 MMHG | WEIGHT: 234.5 LBS | DIASTOLIC BLOOD PRESSURE: 84 MMHG | TEMPERATURE: 98.2 F

## 2024-07-15 DIAGNOSIS — Z90.89 ACQUIRED ABSENCE OF OTHER ORGANS: ICD-10-CM

## 2024-07-15 DIAGNOSIS — D50.9 IRON DEFICIENCY ANEMIA, UNSPECIFIED: ICD-10-CM

## 2024-07-15 DIAGNOSIS — G47.33 OBSTRUCTIVE SLEEP APNEA (ADULT) (PEDIATRIC): ICD-10-CM

## 2024-07-15 DIAGNOSIS — E66.9 OBESITY, UNSPECIFIED: ICD-10-CM

## 2024-07-15 DIAGNOSIS — Z00.00 ENCOUNTER FOR GENERAL ADULT MEDICAL EXAMINATION WITHOUT ABNORMAL FINDINGS: ICD-10-CM

## 2024-07-15 PROCEDURE — 99214 OFFICE O/P EST MOD 30 MIN: CPT

## 2024-07-15 PROCEDURE — 99214 OFFICE O/P EST MOD 30 MIN: CPT | Mod: GC

## 2024-07-15 PROCEDURE — G2211 COMPLEX E/M VISIT ADD ON: CPT | Mod: NC,1L

## 2024-07-17 ENCOUNTER — APPOINTMENT (OUTPATIENT)
Dept: OTOLARYNGOLOGY | Facility: CLINIC | Age: 24
End: 2024-07-17

## 2024-07-18 DIAGNOSIS — G47.33 OBSTRUCTIVE SLEEP APNEA (ADULT) (PEDIATRIC): ICD-10-CM

## 2024-07-18 DIAGNOSIS — E66.9 OBESITY, UNSPECIFIED: ICD-10-CM

## 2024-07-18 DIAGNOSIS — Z90.89 ACQUIRED ABSENCE OF OTHER ORGANS: ICD-10-CM

## 2024-07-18 DIAGNOSIS — D50.9 IRON DEFICIENCY ANEMIA, UNSPECIFIED: ICD-10-CM

## 2024-09-03 ENCOUNTER — APPOINTMENT (OUTPATIENT)
Age: 24
End: 2024-09-03

## 2024-09-06 ENCOUNTER — APPOINTMENT (OUTPATIENT)
Age: 24
End: 2024-09-06

## 2024-09-06 ENCOUNTER — APPOINTMENT (OUTPATIENT)
Dept: PULMONOLOGY | Facility: CLINIC | Age: 24
End: 2024-09-06

## 2024-09-18 NOTE — ED PROVIDER NOTE - NS ED ATTENDING STATEMENT MOD
Patient is A/O x 1 (only knows name). Patient is cognitively impaired This was a shared visit with the EUGENE. I reviewed and verified the documentation.

## 2024-09-20 ENCOUNTER — APPOINTMENT (OUTPATIENT)
Dept: PULMONOLOGY | Facility: CLINIC | Age: 24
End: 2024-09-20

## 2024-11-20 ENCOUNTER — OUTPATIENT (OUTPATIENT)
Dept: OUTPATIENT SERVICES | Facility: HOSPITAL | Age: 24
LOS: 1 days | End: 2024-11-20
Payer: MEDICAID

## 2024-11-20 ENCOUNTER — LABORATORY RESULT (OUTPATIENT)
Age: 24
End: 2024-11-20

## 2024-11-20 ENCOUNTER — APPOINTMENT (OUTPATIENT)
Dept: GASTROENTEROLOGY | Facility: CLINIC | Age: 24
End: 2024-11-20
Payer: MEDICAID

## 2024-11-20 ENCOUNTER — NON-APPOINTMENT (OUTPATIENT)
Age: 24
End: 2024-11-20

## 2024-11-20 VITALS
DIASTOLIC BLOOD PRESSURE: 69 MMHG | BODY MASS INDEX: 35.55 KG/M2 | TEMPERATURE: 98 F | SYSTOLIC BLOOD PRESSURE: 119 MMHG | OXYGEN SATURATION: 97 % | HEIGHT: 69 IN | WEIGHT: 240 LBS | HEART RATE: 76 BPM

## 2024-11-20 DIAGNOSIS — K20.90 ESOPHAGITIS, UNSPECIFIED WITHOUT BLEEDING: ICD-10-CM

## 2024-11-20 DIAGNOSIS — D50.9 IRON DEFICIENCY ANEMIA, UNSPECIFIED: ICD-10-CM

## 2024-11-20 DIAGNOSIS — Z00.00 ENCOUNTER FOR GENERAL ADULT MEDICAL EXAMINATION WITHOUT ABNORMAL FINDINGS: ICD-10-CM

## 2024-11-20 PROCEDURE — 83550 IRON BINDING TEST: CPT

## 2024-11-20 PROCEDURE — 82728 ASSAY OF FERRITIN: CPT

## 2024-11-20 PROCEDURE — 85027 COMPLETE CBC AUTOMATED: CPT

## 2024-11-20 PROCEDURE — 83540 ASSAY OF IRON: CPT

## 2024-11-20 PROCEDURE — 86364 TISS TRNSGLTMNASE EA IG CLAS: CPT

## 2024-11-20 PROCEDURE — 99204 OFFICE O/P NEW MOD 45 MIN: CPT

## 2024-11-20 PROCEDURE — 82784 ASSAY IGA/IGD/IGG/IGM EACH: CPT

## 2024-11-22 LAB
BASOPHILS # BLD AUTO: 0.09 K/UL
BASOPHILS NFR BLD AUTO: 0.8 %
EOSINOPHIL # BLD AUTO: 0.31 K/UL
EOSINOPHIL NFR BLD AUTO: 2.9 %
FERRITIN SERPL-MCNC: 8 NG/ML
HCT VFR BLD CALC: 35.5 %
HGB BLD-MCNC: 9.4 G/DL
IGA SER QL IEP: 144 MG/DL
IMM GRANULOCYTES NFR BLD AUTO: 0.4 %
IRON SATN MFR SERPL: 4 %
IRON SERPL-MCNC: 18 UG/DL
LYMPHOCYTES # BLD AUTO: 1.83 K/UL
LYMPHOCYTES NFR BLD AUTO: 17.2 %
MAN DIFF?: NORMAL
MCHC RBC-ENTMCNC: 15.5 PG
MCHC RBC-ENTMCNC: 26.5 G/DL
MCV RBC AUTO: 58.7 FL
MONOCYTES # BLD AUTO: 0.97 K/UL
MONOCYTES NFR BLD AUTO: 9.1 %
NEUTROPHILS # BLD AUTO: 7.41 K/UL
NEUTROPHILS NFR BLD AUTO: 69.6 %
PLATELET # BLD AUTO: 599 K/UL
PMV BLD AUTO: 0 /100 WBCS
RBC # BLD: 6.05 M/UL
RBC # FLD: 22.4 %
TIBC SERPL-MCNC: 464 UG/DL
TTG IGA SER IA-ACNC: <0.5 U/ML
TTG IGA SER-ACNC: NEGATIVE
TTG IGG SER IA-ACNC: <0.8 U/ML
TTG IGG SER IA-ACNC: NEGATIVE
UIBC SERPL-MCNC: 446 UG/DL
WBC # FLD AUTO: 10.65 K/UL

## 2024-11-22 RX ORDER — PANTOPRAZOLE 40 MG/1
40 TABLET, DELAYED RELEASE ORAL TWICE DAILY
Qty: 120 | Refills: 1 | Status: ACTIVE | COMMUNITY
Start: 2024-11-20 | End: 1900-01-01

## 2024-11-22 RX ORDER — POLYETHYLENE GLYCOL 3350 AND ELECTROLYTES WITH LEMON FLAVOR 236; 22.74; 6.74; 5.86; 2.97 G/4L; G/4L; G/4L; G/4L; G/4L
236 POWDER, FOR SOLUTION ORAL
Qty: 1 | Refills: 0 | Status: ACTIVE | COMMUNITY
Start: 2024-11-20 | End: 1900-01-01

## 2024-11-29 DIAGNOSIS — K20.90 ESOPHAGITIS, UNSPECIFIED WITHOUT BLEEDING: ICD-10-CM

## 2024-11-29 DIAGNOSIS — D50.9 IRON DEFICIENCY ANEMIA, UNSPECIFIED: ICD-10-CM

## 2024-12-15 NOTE — PATIENT PROFILE ADULT - LEGAL HELP
We did review previous blood work,  CBC/CMP/Lipids were ok 12/23-  Glucose was fine at 87.  Hb fine at 13.5      Immunization History   Administered Date(s) Administered    COVID-19 MODERNA VACC 0.5 ML IM 04/10/2021, 05/08/2021, 01/09/2022    INFLUENZA 11/04/2019, 11/07/2020, 11/02/2021, 11/05/2022    Influenza Quadrivalent Preservative Free 3 years and older IM 11/05/2018    Influenza, injectable, quadrivalent, preservative free 0.5 mL 12/11/2023       She usually does do yearly Flu shot. Declines this year.  Tdap/tetanus shot is due, is up to date, will be done at a future date  (done every 10 yrs for superficial cuts, every 5 yrs for deep wounds)   Can also look into coverage for 'shingles' shot, Shingrix. Can do that here or at pharmacy.  Covid vaccine prev rcvd x 3    Non-smoker     Regarding Colon Cancer screening, screening is UTD -  had colonoscopy 6/2020  ( 2nd normal one) -   I would redo '5-7 yrs' w/ Fhx Colon Cancer ( MGM ).    She does see her Gynecologist routinely.   Discussed screening Mammogram, this is up-to-date. ( Was ok 9/24 )     Regarding Hepatitis C Screening -    previously perfomed and was negative.    Continue to try to watch healthy diet, exercise routinely.    She does see dermatology routinely.  She does have area left lower extremity anterior tibial where she struck her leg over the summer, had no wound, does have thickened area.  May have had anterior tibial phlebitis, no calf pain.  Moisturize and observe,    We will see her again in 12 months, sooner as needed.       
no

## 2024-12-19 NOTE — ASU PREOP CHECKLIST - AICD PRESENT
Quality 431: Preventive Care And Screening: Unhealthy Alcohol Use - Screening: Patient not identified as an unhealthy alcohol user when screened for unhealthy alcohol use using a systematic screening method
Quality 226: Preventive Care And Screening: Tobacco Use: Screening And Cessation Intervention: Patient screened for tobacco use and is an ex/non-smoker
Detail Level: Detailed
no

## 2025-01-13 NOTE — ED ADULT NURSE NOTE - IS THE PATIENT ABLE TO BE SCREENED?
components found for: \"LDLCALC\"  No components found for: \"LABVLDL\"     XR CHEST PORTABLE    Result Date: 1/12/2025  EXAMINATION: ONE XRAY VIEW OF THE CHEST 1/12/2025 11:58 am COMPARISON: 02/20/2018 HISTORY: ORDERING SYSTEM PROVIDED HISTORY: epigastric pain TECHNOLOGIST PROVIDED HISTORY: Reason for exam:->epigastric pain FINDINGS: The lungs are without acute focal process.  There is no effusion or pneumothorax. The cardiomediastinal silhouette is without acute process. The osseous structures are without acute process.     No acute process.     CT ABDOMEN PELVIS W IV CONTRAST Additional Contrast? None    Result Date: 1/12/2025  EXAMINATION: CT OF THE ABDOMEN AND PELVIS WITH CONTRAST 1/12/2025 11:28 am TECHNIQUE: CT of the abdomen and pelvis was performed with the administration of intravenous contrast. Multiplanar reformatted images are provided for review. Automated exposure control, iterative reconstruction, and/or weight based adjustment of the mA/kV was utilized to reduce the radiation dose to as low as reasonably achievable. COMPARISON: CT abdomen and pelvis dated 08/30/2022 HISTORY: ORDERING SYSTEM PROVIDED HISTORY: epigastric pain, n/v, ?hematemesis TECHNOLOGIST PROVIDED HISTORY: Reason for exam:->epigastric pain, n/v, ?hematemesis Additional Contrast?->None Decision Support Exception - unselect if not a suspected or confirmed emergency medical condition->Emergency Medical Condition (MA) FINDINGS: Unless otherwise indicated or stated incidental findings do not require dedicated follow-up imaging. Lower Chest: Lung bases are clear. Organs: Liver without suspicious lesion.  Low-attenuation focus subcentimeter segment 3 liver likely small cysts or hemangioma with a separate area of likely cyst or hemangioma in the right hepatic lobe inferiorly series 2, image 72 with well-defined margins and fluid density.  Gallbladder unremarkable. Pancreas and spleen unremarkable.  Adrenals without nodule. Kidneys without  Yes

## 2025-01-16 ENCOUNTER — OUTPATIENT (OUTPATIENT)
Dept: OUTPATIENT SERVICES | Facility: HOSPITAL | Age: 25
LOS: 1 days | Discharge: HOME | End: 2025-01-16
Payer: MEDICAID

## 2025-01-16 VITALS
DIASTOLIC BLOOD PRESSURE: 63 MMHG | TEMPERATURE: 98 F | RESPIRATION RATE: 18 BRPM | SYSTOLIC BLOOD PRESSURE: 111 MMHG | HEART RATE: 88 BPM | OXYGEN SATURATION: 98 %

## 2025-01-16 VITALS
WEIGHT: 229.94 LBS | HEIGHT: 69 IN | TEMPERATURE: 99 F | RESPIRATION RATE: 18 BRPM | DIASTOLIC BLOOD PRESSURE: 71 MMHG | HEART RATE: 98 BPM | SYSTOLIC BLOOD PRESSURE: 123 MMHG | OXYGEN SATURATION: 100 %

## 2025-01-16 DIAGNOSIS — D50.9 IRON DEFICIENCY ANEMIA, UNSPECIFIED: ICD-10-CM

## 2025-01-16 DIAGNOSIS — Z90.89 ACQUIRED ABSENCE OF OTHER ORGANS: Chronic | ICD-10-CM

## 2025-01-16 LAB
HCT VFR BLD CALC: 31.8 % — LOW (ref 42–52)
HGB BLD-MCNC: 8.6 G/DL — LOW (ref 14–18)
MCHC RBC-ENTMCNC: 15.3 PG — LOW (ref 27–31)
MCHC RBC-ENTMCNC: 27 G/DL — LOW (ref 32–37)
MCV RBC AUTO: 56.6 FL — LOW (ref 80–94)
PLATELET # BLD AUTO: 581 K/UL — HIGH (ref 130–400)
RBC # BLD: 5.62 M/UL — SIGNIFICANT CHANGE UP (ref 4.7–6.1)
RBC # FLD: 21.8 % — HIGH (ref 11.5–14.5)
WBC # BLD: 10.85 K/UL — HIGH (ref 4.8–10.8)
WBC # FLD AUTO: 10.85 K/UL — HIGH (ref 4.8–10.8)

## 2025-01-16 PROCEDURE — 85027 COMPLETE CBC AUTOMATED: CPT

## 2025-01-16 PROCEDURE — 45378 DIAGNOSTIC COLONOSCOPY: CPT

## 2025-01-16 PROCEDURE — 36415 COLL VENOUS BLD VENIPUNCTURE: CPT

## 2025-01-16 RX ORDER — PANTOPRAZOLE 40 MG/1
1 TABLET, DELAYED RELEASE ORAL
Refills: 0 | DISCHARGE

## 2025-01-16 NOTE — ASU DISCHARGE PLAN (ADULT/PEDIATRIC) - NS MD DC FALL RISK RISK
For information on Fall & Injury Prevention, visit: [email or web address] OR  [email or web address] OR  https://www.cdc.gov/steadi/patient.html

## 2025-01-16 NOTE — ASU DISCHARGE PLAN (ADULT/PEDIATRIC) - FINANCIAL ASSISTANCE
[facility] provides services at a reduced cost to those who are determined to be eligible through [facility]’s financial assistance program. Information regarding [facility]’s financial assistance program can be found by going to [email or web address] or by calling [phone].

## 2025-01-16 NOTE — H&P PST ADULT - ASSESSMENT
24-year-old male patient known to have a history of ADHD, sickle cell trait, DL, and KIMMIE s/p tonsillectomy and   adenoidectomy who was referred to the ED on 04/09 for Hb of 5 s/p EGD showing grade B esophagitis  Scheduled for colonoscopy

## 2025-01-16 NOTE — ASU PATIENT PROFILE, ADULT - FALL HARM RISK - UNIVERSAL INTERVENTIONS
Bed in lowest position, wheels locked, appropriate side rails in place/Call bell, personal items and telephone in reach/Instruct patient to call for assistance before getting out of bed or chair/Non-slip footwear when patient is out of bed/[place] to call system/Physically safe environment - no spills, clutter or unnecessary equipment/Purposeful Proactive Rounding/Room/bathroom lighting operational, light cord in reach

## 2025-01-16 NOTE — ASU PATIENT PROFILE, ADULT - PATIENT REPRESENTATIVE: ( YOU CAN CHOOSE ANY PERSON THAT CAN ASSIST YOU WITH YOUR HEALTH CARE PREFERENCES, DOES NOT HAVE TO BE A SPOUSE, IMMEDIATE FAMILY OR SIGNIFICANT OTHER/PARTNER)
Ortho Note    Subjective:    Pipo Campos is a 49 y.o. male who is POD 1 s/p RIGHT ankle and subtalar joint arthrotomies/I&D for septic arthritis    Major Events:. Pain controlled, NGTD on cxs. Pt afebrile, normal WBC    Objective:    Vital signs in last 24 hours:    Temp:  [97.3 °F (36.3 °C)-99.1 °F (37.3 °C)]   Pulse (Heart Rate):  []   BP: (123-158)/(67-90)   Resp Rate:  [11-20]   O2 Sat (%):  [92 %-100 %]   Weight:  [122.5 kg (270 lb)]     Temp (24hrs), Av.1 °F (36.7 °C), Min:97.3 °F (36.3 °C), Max:99.1 °F (37.3 °C)      Labs:    Lab Results   Component Value Date/Time    WBC 10.9 11/10/2022 02:56 AM    HGB 10.7 (L) 11/10/2022 02:56 AM    HCT 33.1 (L) 11/10/2022 02:56 AM    PLATELET 220 (H)  02:56 AM       Lab Results   Component Value Date/Time    Sodium 136 11/10/2022 02:56 AM    Potassium 4.2 11/10/2022 02:56 AM    Chloride 103 11/10/2022 02:56 AM    CO2 28 11/10/2022 02:56 AM    BUN 15 11/10/2022 02:56 AM       Physical Exam:    General: alert, cooperative, in NAD  Nonlabored resp    RIGHT Lower extremity    Dressing: changed, mod s/s drainage  Penrose dressings in place med/lateral incisions  Swelling improving, no purulent drainage  Cmpts soft      Motor: + ankle df/pf    Sensory: SILT    Vascular: Brisk capillary refill in toes    Assessment/Plan:    · Pain management: as written    · DVT Prophylaxis: Lovenox 40mg SC daily while in house (can transition to aspirin 81mg PO BID when d/c'd home)    · PT/OT: WBAT in Cam boot  Keep boot on while in bed to prevent achilles contracture  Float heel to prevent pressure ulcer    Cont IV abx  BID dressing changes with xeroform, 4x4s, kerlix, ace wrap  Cont ROM as tolerated in the ankle    · Dispo: pending ID recs, will likely need PICC and long term IV abx    F/u: [facility] as scheduled , ppw on chart, [facility] 7.  Carlos Arrieta MD Declines

## 2025-01-16 NOTE — CHART NOTE - NSCHARTNOTEFT_GEN_A_CORE
PACU ANESTHESIA ADMISSION NOTE      Procedure:   Post op diagnosis:      ____  Intubated  TV:______       Rate: ______      FiO2: ______    _x___  Patent Airway    _x___  Full return of protective reflexes    _x___  Full recovery from anesthesia / back to baseline status    Vitals  SPO2:-96  HR:-102  RR:-11  B.P:118/60  TEMP:-98    Mental Status:  _x___ Awake   ___x_ Alert   _____ Drowsy   _____ Sedated    Nausea/Vomiting:  _x___  NO       ______Yes,   See Post - Op Orders         Pain Scale (0-10):  __0___    Treatment: _x___ None    __x__ See Post - Op/PCA Orders    Post - Operative Fluids:   ___ Oral   ____x See Post - Op Orders    Plan: Discharge:   ____Home       ___x__Floor     _____Critical Care    _____  Other:_________________    Comments:  Report endorsed to RN in pacu  Vitals stable  No anesthesia issues or complications noted.  Discharge to patient to floor / home when criteria met.

## 2025-01-22 DIAGNOSIS — G47.33 OBSTRUCTIVE SLEEP APNEA (ADULT) (PEDIATRIC): ICD-10-CM

## 2025-01-22 DIAGNOSIS — D64.9 ANEMIA, UNSPECIFIED: ICD-10-CM

## 2025-01-22 DIAGNOSIS — F90.9 ATTENTION-DEFICIT HYPERACTIVITY DISORDER, UNSPECIFIED TYPE: ICD-10-CM

## 2025-01-22 DIAGNOSIS — E78.5 HYPERLIPIDEMIA, UNSPECIFIED: ICD-10-CM

## 2025-01-22 DIAGNOSIS — K64.8 OTHER HEMORRHOIDS: ICD-10-CM

## 2025-01-22 DIAGNOSIS — D57.3 SICKLE-CELL TRAIT: ICD-10-CM

## 2025-02-06 ENCOUNTER — APPOINTMENT (OUTPATIENT)
Dept: INTERNAL MEDICINE | Facility: CLINIC | Age: 25
End: 2025-02-06

## 2025-03-16 NOTE — DISCHARGE NOTE NURSING/CASE MANAGEMENT/SOCIAL WORK - NSDCPETBCESMAN_GEN_ALL_CORE
(0) blue, pale If you are a smoker, it is important for your health to stop smoking. Please be aware that second hand smoke is also harmful.